# Patient Record
Sex: FEMALE | Race: WHITE | Employment: FULL TIME | URBAN - METROPOLITAN AREA
[De-identification: names, ages, dates, MRNs, and addresses within clinical notes are randomized per-mention and may not be internally consistent; named-entity substitution may affect disease eponyms.]

---

## 2021-11-21 ENCOUNTER — HOSPITAL ENCOUNTER (INPATIENT)
Age: 26
LOS: 1 days | Discharge: HOME | DRG: 776 | End: 2021-11-23
Attending: STUDENT IN AN ORGANIZED HEALTH CARE EDUCATION/TRAINING PROGRAM | Admitting: HOSPITALIST
Payer: COMMERCIAL

## 2021-11-21 DIAGNOSIS — E87.6 HYPOKALEMIA: ICD-10-CM

## 2021-11-21 DIAGNOSIS — Z98.891 H/O CESAREAN SECTION: ICD-10-CM

## 2021-11-21 DIAGNOSIS — D64.9 ANEMIA, UNSPECIFIED TYPE: ICD-10-CM

## 2021-11-21 DIAGNOSIS — N30.00 ACUTE CYSTITIS WITHOUT HEMATURIA: Primary | ICD-10-CM

## 2021-11-21 PROCEDURE — 99285 EMERGENCY DEPT VISIT HI MDM: CPT

## 2021-11-21 PROCEDURE — 81001 URINALYSIS AUTO W/SCOPE: CPT

## 2021-11-21 PROCEDURE — 85025 COMPLETE CBC W/AUTO DIFF WBC: CPT

## 2021-11-21 PROCEDURE — 96374 THER/PROPH/DIAG INJ IV PUSH: CPT

## 2021-11-21 PROCEDURE — 80048 BASIC METABOLIC PNL TOTAL CA: CPT

## 2021-11-21 PROCEDURE — 82550 ASSAY OF CK (CPK): CPT

## 2021-11-21 PROCEDURE — 96375 TX/PRO/DX INJ NEW DRUG ADDON: CPT

## 2021-11-22 ENCOUNTER — APPOINTMENT (OUTPATIENT)
Dept: CT IMAGING | Age: 26
DRG: 776 | End: 2021-11-22
Attending: INTERNAL MEDICINE
Payer: COMMERCIAL

## 2021-11-22 ENCOUNTER — APPOINTMENT (OUTPATIENT)
Dept: VASCULAR SURGERY | Age: 26
DRG: 776 | End: 2021-11-22
Attending: INTERNAL MEDICINE
Payer: COMMERCIAL

## 2021-11-22 ENCOUNTER — APPOINTMENT (OUTPATIENT)
Dept: NON INVASIVE DIAGNOSTICS | Age: 26
DRG: 776 | End: 2021-11-22
Attending: HOSPITALIST
Payer: COMMERCIAL

## 2021-11-22 PROBLEM — E87.6 HYPOKALEMIA: Status: ACTIVE | Noted: 2021-11-22

## 2021-11-22 PROBLEM — Z91.89 AT RISK FOR BREASTFEEDING DIFFICULTY: Status: ACTIVE | Noted: 2021-11-22

## 2021-11-22 PROBLEM — N39.0 UTI (URINARY TRACT INFECTION): Status: ACTIVE | Noted: 2021-11-22

## 2021-11-22 PROBLEM — D72.829 LEUKOCYTOSIS: Status: ACTIVE | Noted: 2021-11-22

## 2021-11-22 PROBLEM — D64.9 ANEMIA: Status: ACTIVE | Noted: 2021-11-22

## 2021-11-22 LAB
ALBUMIN SERPL-MCNC: 1.9 G/DL (ref 3.4–5)
ALBUMIN/GLOB SERPL: 0.6 {RATIO} (ref 0.8–1.7)
ALP SERPL-CCNC: 131 U/L (ref 45–117)
ALT SERPL-CCNC: 16 U/L (ref 13–56)
ANION GAP SERPL CALC-SCNC: 10 MMOL/L (ref 3–18)
ANION GAP SERPL CALC-SCNC: 8 MMOL/L (ref 3–18)
APPEARANCE UR: ABNORMAL
AST SERPL-CCNC: 12 U/L (ref 10–38)
BACTERIA URNS QL MICRO: ABNORMAL /HPF
BASOPHILS # BLD: 0 K/UL (ref 0–0.1)
BASOPHILS # BLD: 0.2 K/UL (ref 0–0.1)
BASOPHILS NFR BLD: 0 % (ref 0–2)
BASOPHILS NFR BLD: 1 % (ref 0–2)
BILIRUB SERPL-MCNC: 0.5 MG/DL (ref 0.2–1)
BILIRUB UR QL: NEGATIVE
BUN SERPL-MCNC: 10 MG/DL (ref 7–18)
BUN SERPL-MCNC: 14 MG/DL (ref 7–18)
BUN/CREAT SERPL: 18 (ref 12–20)
BUN/CREAT SERPL: 25 (ref 12–20)
CALCIUM SERPL-MCNC: 7.9 MG/DL (ref 8.5–10.1)
CALCIUM SERPL-MCNC: 8 MG/DL (ref 8.5–10.1)
CHLORIDE SERPL-SCNC: 110 MMOL/L (ref 100–111)
CHLORIDE SERPL-SCNC: 111 MMOL/L (ref 100–111)
CK SERPL-CCNC: 38 U/L (ref 26–192)
CO2 SERPL-SCNC: 21 MMOL/L (ref 21–32)
CO2 SERPL-SCNC: 23 MMOL/L (ref 21–32)
COLOR UR: 0
CREAT SERPL-MCNC: 0.55 MG/DL (ref 0.6–1.3)
CREAT SERPL-MCNC: 0.56 MG/DL (ref 0.6–1.3)
DIFFERENTIAL METHOD BLD: ABNORMAL
DIFFERENTIAL METHOD BLD: ABNORMAL
ECHO AO ROOT DIAM: 2.48 CM
ECHO AV AREA PEAK VELOCITY: 2.79 CM2
ECHO AV AREA VTI: 2.53 CM2
ECHO AV AREA/BSA PEAK VELOCITY: 1.5 CM2/M2
ECHO AV AREA/BSA VTI: 1.4 CM2/M2
ECHO AV MEAN GRADIENT: 5.7 MMHG
ECHO AV PEAK GRADIENT: 9.87 MMHG
ECHO AV PEAK VELOCITY: 156.96 CM/S
ECHO AV VTI: 33.94 CM
ECHO EST RA PRESSURE: 3 MMHG
ECHO IVC PROX: 1.75 CM
ECHO LA AREA 4C: 25.17 CM2
ECHO LA MAJOR AXIS: 3.22 CM
ECHO LA MINOR AXIS: 1.77 CM
ECHO LA VOL 2C: 46.83 ML (ref 22–52)
ECHO LA VOL 4C: 77.43 ML (ref 22–52)
ECHO LA VOL BP: 67.9 ML (ref 22–52)
ECHO LA VOL/BSA BIPLANE: 37.31 ML/M2 (ref 16–28)
ECHO LA VOLUME INDEX A2C: 25.73 ML/M2 (ref 16–28)
ECHO LA VOLUME INDEX A4C: 42.54 ML/M2 (ref 16–28)
ECHO LV E' LATERAL VELOCITY: 13.99 CM/S
ECHO LV E' SEPTAL VELOCITY: 10.8 CM/S
ECHO LV EDV A2C: 169.32 ML
ECHO LV EDV A4C: 139.63 ML
ECHO LV EDV BP: 165.68 ML (ref 56–104)
ECHO LV EDV INDEX A4C: 76.7 ML/M2
ECHO LV EDV INDEX BP: 91 ML/M2
ECHO LV EDV NDEX A2C: 93 ML/M2
ECHO LV EJECTION FRACTION A2C: 50 PERCENT
ECHO LV EJECTION FRACTION A4C: 59 PERCENT
ECHO LV EJECTION FRACTION BIPLANE: 55.1 PERCENT (ref 55–100)
ECHO LV ESV A2C: 84.25 ML
ECHO LV ESV A4C: 56.7 ML
ECHO LV ESV BP: 74.38 ML (ref 19–49)
ECHO LV ESV INDEX A2C: 46.3 ML/M2
ECHO LV ESV INDEX A4C: 31.2 ML/M2
ECHO LV ESV INDEX BP: 40.9 ML/M2
ECHO LV GLOBAL LONGITUDINAL STRAIN (GLS): -17.4 PERCENT
ECHO LV INTERNAL DIMENSION DIASTOLIC: 4.99 CM (ref 3.9–5.3)
ECHO LV INTERNAL DIMENSION SYSTOLIC: 3.03 CM
ECHO LV IVSD: 0.86 CM (ref 0.6–0.9)
ECHO LV MASS 2D: 163.5 G (ref 67–162)
ECHO LV MASS INDEX 2D: 89.8 G/M2 (ref 43–95)
ECHO LV POSTERIOR WALL DIASTOLIC: 0.99 CM (ref 0.6–0.9)
ECHO LVOT CARDIAC OUTPUT: 7.08 LITER/MINUTE
ECHO LVOT DIAM: 2.24 CM
ECHO LVOT PEAK GRADIENT: 4.93 MMHG
ECHO LVOT PEAK VELOCITY: 111.03 CM/S
ECHO LVOT SV: 86 ML
ECHO LVOT VTI: 21.78 CM
ECHO MV A VELOCITY: 61.75 CM/S
ECHO MV E DECELERATION TIME (DT): 158.17 MS
ECHO MV E VELOCITY: 117.55 CM/S
ECHO MV E/A RATIO: 1.9
ECHO MV E/E' LATERAL: 8.4
ECHO MV E/E' RATIO (AVERAGED): 9.64
ECHO MV E/E' SEPTAL: 10.88
ECHO MV REGURGITANT VTIA: 192.88 CM
ECHO RA AREA 4C: 22.61 CM2
ECHO RV INTERNAL DIMENSION: 3.6 CM
ECHO RV TAPSE: 0.43 CM (ref 1.5–2)
ECHO TV REGURGITANT MAX VELOCITY: 279.23 CM/S
ECHO TV REGURGITANT PEAK GRADIENT: 31.19 MMHG
EOSINOPHIL # BLD: 0 K/UL (ref 0–0.4)
EOSINOPHIL # BLD: 0 K/UL (ref 0–0.4)
EOSINOPHIL NFR BLD: 0 % (ref 0–5)
EOSINOPHIL NFR BLD: 0 % (ref 0–5)
EPITH CASTS URNS QL MICRO: ABNORMAL /LPF (ref 0–5)
ERYTHROCYTE [DISTWIDTH] IN BLOOD BY AUTOMATED COUNT: 14.3 % (ref 11.6–14.5)
ERYTHROCYTE [DISTWIDTH] IN BLOOD BY AUTOMATED COUNT: 14.7 % (ref 11.6–14.5)
GLOBAL LONGITUDINAL STRAIN 2 CHAMBER: -19.1 PERCENT
GLOBAL LONGITUDINAL STRAIN 4 CHAMBER: -15.9 PERCENT
GLOBAL LONGITUDINAL STRAIN LONG AXIS: -17.3 PERCENT
GLOBULIN SER CALC-MCNC: 3.1 G/DL (ref 2–4)
GLUCOSE SERPL-MCNC: 80 MG/DL (ref 74–99)
GLUCOSE SERPL-MCNC: 89 MG/DL (ref 74–99)
GLUCOSE UR STRIP.AUTO-MCNC: NEGATIVE MG/DL
HCT VFR BLD AUTO: 21.4 % (ref 35–45)
HCT VFR BLD AUTO: 24.8 % (ref 35–45)
HGB BLD-MCNC: 7 G/DL (ref 12–16)
HGB BLD-MCNC: 8.2 G/DL (ref 12–16)
HGB UR QL STRIP: ABNORMAL
HISTORY CHECKED?,CKHIST: NORMAL
IMM GRANULOCYTES # BLD AUTO: 0 K/UL
IMM GRANULOCYTES # BLD AUTO: 0 K/UL
IMM GRANULOCYTES NFR BLD AUTO: 0 %
IMM GRANULOCYTES NFR BLD AUTO: 0 %
KETONES UR QL STRIP.AUTO: ABNORMAL MG/DL
LA VOL DISK BP: 64.34 ML (ref 22–52)
LEUKOCYTE ESTERASE UR QL STRIP.AUTO: ABNORMAL
LVOT MG: 2.71 MMHG
LYMPHOCYTES # BLD: 1.5 K/UL (ref 0.9–3.6)
LYMPHOCYTES # BLD: 2.2 K/UL (ref 0.9–3.6)
LYMPHOCYTES NFR BLD: 13 % (ref 21–52)
LYMPHOCYTES NFR BLD: 9 % (ref 21–52)
MAGNESIUM SERPL-MCNC: 1.9 MG/DL (ref 1.6–2.6)
MCH RBC QN AUTO: 30.6 PG (ref 24–34)
MCH RBC QN AUTO: 30.8 PG (ref 24–34)
MCHC RBC AUTO-ENTMCNC: 32.7 G/DL (ref 31–37)
MCHC RBC AUTO-ENTMCNC: 33.1 G/DL (ref 31–37)
MCV RBC AUTO: 93.2 FL (ref 78–100)
MCV RBC AUTO: 93.4 FL (ref 78–100)
MONOCYTES # BLD: 0.3 K/UL (ref 0.05–1.2)
MONOCYTES # BLD: 0.7 K/UL (ref 0.05–1.2)
MONOCYTES NFR BLD: 2 % (ref 3–10)
MONOCYTES NFR BLD: 4 % (ref 3–10)
MR MG: 106.42 MMHG
MV DEC SLOPE: 7.46 METER/SECOND2
NEUTS BAND NFR BLD MANUAL: 2 % (ref 0–5)
NEUTS BAND NFR BLD MANUAL: 4 % (ref 0–5)
NEUTS SEG # BLD: 14.1 K/UL (ref 1.8–8)
NEUTS SEG # BLD: 14.8 K/UL (ref 1.8–8)
NEUTS SEG NFR BLD: 81 % (ref 40–73)
NEUTS SEG NFR BLD: 84 % (ref 40–73)
NITRITE UR QL STRIP.AUTO: NEGATIVE
NRBC # BLD: 0.05 K/UL (ref 0–0.01)
NRBC # BLD: 0.06 K/UL (ref 0–0.01)
NRBC BLD-RTO: 0.3 PER 100 WBC
NRBC BLD-RTO: 0.4 PER 100 WBC
PH UR STRIP: 6.5 [PH] (ref 5–8)
PLATELET # BLD AUTO: 369 K/UL (ref 135–420)
PLATELET # BLD AUTO: 486 K/UL (ref 135–420)
PLATELET COMMENTS,PCOM: ABNORMAL
PLATELET COMMENTS,PCOM: ABNORMAL
PMV BLD AUTO: 8.5 FL (ref 9.2–11.8)
PMV BLD AUTO: 8.6 FL (ref 9.2–11.8)
POTASSIUM SERPL-SCNC: 3 MMOL/L (ref 3.5–5.5)
POTASSIUM SERPL-SCNC: 3.7 MMOL/L (ref 3.5–5.5)
PROT SERPL-MCNC: 5 G/DL (ref 6.4–8.2)
PROT UR STRIP-MCNC: 100 MG/DL
RBC # BLD AUTO: 2.29 M/UL (ref 4.2–5.3)
RBC # BLD AUTO: 2.66 M/UL (ref 4.2–5.3)
RBC #/AREA URNS HPF: ABNORMAL /HPF (ref 0–5)
RBC MORPH BLD: ABNORMAL
RBC MORPH BLD: ABNORMAL
SODIUM SERPL-SCNC: 141 MMOL/L (ref 136–145)
SODIUM SERPL-SCNC: 142 MMOL/L (ref 136–145)
SP GR UR REFRACTOMETRY: 1.02 (ref 1–1.03)
UROBILINOGEN UR QL STRIP.AUTO: 1 EU/DL (ref 0.2–1)
WBC # BLD AUTO: 16.8 K/UL (ref 4.6–13.2)
WBC # BLD AUTO: 17 K/UL (ref 4.6–13.2)
WBC URNS QL MICRO: ABNORMAL /HPF (ref 0–5)

## 2021-11-22 PROCEDURE — 74011250637 HC RX REV CODE- 250/637: Performed by: STUDENT IN AN ORGANIZED HEALTH CARE EDUCATION/TRAINING PROGRAM

## 2021-11-22 PROCEDURE — 93306 TTE W/DOPPLER COMPLETE: CPT

## 2021-11-22 PROCEDURE — P9016 RBC LEUKOCYTES REDUCED: HCPCS

## 2021-11-22 PROCEDURE — 74011250636 HC RX REV CODE- 250/636: Performed by: STUDENT IN AN ORGANIZED HEALTH CARE EDUCATION/TRAINING PROGRAM

## 2021-11-22 PROCEDURE — 30233N1 TRANSFUSION OF NONAUTOLOGOUS RED BLOOD CELLS INTO PERIPHERAL VEIN, PERCUTANEOUS APPROACH: ICD-10-PCS | Performed by: HOSPITALIST

## 2021-11-22 PROCEDURE — 36415 COLL VENOUS BLD VENIPUNCTURE: CPT

## 2021-11-22 PROCEDURE — 65270000029 HC RM PRIVATE

## 2021-11-22 PROCEDURE — 74011000250 HC RX REV CODE- 250: Performed by: STUDENT IN AN ORGANIZED HEALTH CARE EDUCATION/TRAINING PROGRAM

## 2021-11-22 PROCEDURE — 93970 EXTREMITY STUDY: CPT

## 2021-11-22 PROCEDURE — 74011250636 HC RX REV CODE- 250/636: Performed by: HOSPITALIST

## 2021-11-22 PROCEDURE — 86901 BLOOD TYPING SEROLOGIC RH(D): CPT

## 2021-11-22 PROCEDURE — 83735 ASSAY OF MAGNESIUM: CPT

## 2021-11-22 PROCEDURE — G0378 HOSPITAL OBSERVATION PER HR: HCPCS

## 2021-11-22 PROCEDURE — 74011250637 HC RX REV CODE- 250/637: Performed by: INTERNAL MEDICINE

## 2021-11-22 PROCEDURE — 86923 COMPATIBILITY TEST ELECTRIC: CPT

## 2021-11-22 PROCEDURE — 80053 COMPREHEN METABOLIC PANEL: CPT

## 2021-11-22 PROCEDURE — 85025 COMPLETE CBC W/AUTO DIFF WBC: CPT

## 2021-11-22 PROCEDURE — 74011250637 HC RX REV CODE- 250/637: Performed by: ADVANCED PRACTICE MIDWIFE

## 2021-11-22 PROCEDURE — 74174 CTA ABD&PLVS W/CONTRAST: CPT

## 2021-11-22 PROCEDURE — 74011000636 HC RX REV CODE- 636: Performed by: INTERNAL MEDICINE

## 2021-11-22 PROCEDURE — 36430 TRANSFUSION BLD/BLD COMPNT: CPT

## 2021-11-22 RX ORDER — ONDANSETRON 4 MG/1
4 TABLET, ORALLY DISINTEGRATING ORAL
Status: DISCONTINUED | OUTPATIENT
Start: 2021-11-22 | End: 2021-11-23 | Stop reason: HOSPADM

## 2021-11-22 RX ORDER — FUROSEMIDE 10 MG/ML
40 INJECTION INTRAMUSCULAR; INTRAVENOUS DAILY
Status: DISCONTINUED | OUTPATIENT
Start: 2021-11-23 | End: 2021-11-22

## 2021-11-22 RX ORDER — SODIUM CHLORIDE 0.9 % (FLUSH) 0.9 %
5-40 SYRINGE (ML) INJECTION EVERY 8 HOURS
Status: DISCONTINUED | OUTPATIENT
Start: 2021-11-22 | End: 2021-11-23 | Stop reason: HOSPADM

## 2021-11-22 RX ORDER — ONDANSETRON 2 MG/ML
4 INJECTION INTRAMUSCULAR; INTRAVENOUS
Status: COMPLETED | OUTPATIENT
Start: 2021-11-22 | End: 2021-11-22

## 2021-11-22 RX ORDER — ACETAMINOPHEN 500 MG
1000 TABLET ORAL
Status: COMPLETED | OUTPATIENT
Start: 2021-11-22 | End: 2021-11-22

## 2021-11-22 RX ORDER — SODIUM CHLORIDE 0.9 % (FLUSH) 0.9 %
5-40 SYRINGE (ML) INJECTION AS NEEDED
Status: DISCONTINUED | OUTPATIENT
Start: 2021-11-22 | End: 2021-11-23 | Stop reason: HOSPADM

## 2021-11-22 RX ORDER — LANOLIN ALCOHOL/MO/W.PET/CERES
1 CREAM (GRAM) TOPICAL 2 TIMES DAILY WITH MEALS
Status: DISCONTINUED | OUTPATIENT
Start: 2021-11-22 | End: 2021-11-23 | Stop reason: HOSPADM

## 2021-11-22 RX ORDER — POLYETHYLENE GLYCOL 3350 17 G/17G
17 POWDER, FOR SOLUTION ORAL DAILY PRN
Status: DISCONTINUED | OUTPATIENT
Start: 2021-11-22 | End: 2021-11-23 | Stop reason: HOSPADM

## 2021-11-22 RX ORDER — ONDANSETRON 2 MG/ML
4 INJECTION INTRAMUSCULAR; INTRAVENOUS
Status: DISCONTINUED | OUTPATIENT
Start: 2021-11-22 | End: 2021-11-23 | Stop reason: HOSPADM

## 2021-11-22 RX ORDER — ACETAMINOPHEN 650 MG/1
650 SUPPOSITORY RECTAL
Status: DISCONTINUED | OUTPATIENT
Start: 2021-11-22 | End: 2021-11-23 | Stop reason: HOSPADM

## 2021-11-22 RX ORDER — CEPHALEXIN 500 MG/1
500 CAPSULE ORAL 4 TIMES DAILY
Qty: 40 CAPSULE | Refills: 0 | Status: SHIPPED | OUTPATIENT
Start: 2021-11-22 | End: 2021-11-29

## 2021-11-22 RX ORDER — SODIUM CHLORIDE 9 MG/ML
250 INJECTION, SOLUTION INTRAVENOUS AS NEEDED
Status: DISCONTINUED | OUTPATIENT
Start: 2021-11-22 | End: 2021-11-23 | Stop reason: HOSPADM

## 2021-11-22 RX ORDER — FUROSEMIDE 10 MG/ML
20 INJECTION INTRAMUSCULAR; INTRAVENOUS 2 TIMES DAILY
Status: DISCONTINUED | OUTPATIENT
Start: 2021-11-22 | End: 2021-11-23 | Stop reason: HOSPADM

## 2021-11-22 RX ORDER — IBUPROFEN 400 MG/1
800 TABLET ORAL 3 TIMES DAILY
Status: DISCONTINUED | OUTPATIENT
Start: 2021-11-22 | End: 2021-11-23 | Stop reason: HOSPADM

## 2021-11-22 RX ORDER — ACETAMINOPHEN 325 MG/1
650 TABLET ORAL
Status: DISCONTINUED | OUTPATIENT
Start: 2021-11-22 | End: 2021-11-23 | Stop reason: HOSPADM

## 2021-11-22 RX ORDER — POTASSIUM CHLORIDE 20 MEQ/1
20 TABLET, EXTENDED RELEASE ORAL 3 TIMES DAILY
Qty: 9 TABLET | Refills: 0 | Status: SHIPPED | OUTPATIENT
Start: 2021-11-22 | End: 2021-11-29

## 2021-11-22 RX ORDER — POTASSIUM CHLORIDE 20 MEQ/1
40 TABLET, EXTENDED RELEASE ORAL
Status: COMPLETED | OUTPATIENT
Start: 2021-11-22 | End: 2021-11-22

## 2021-11-22 RX ADMIN — Medication 10 ML: at 14:00

## 2021-11-22 RX ADMIN — IBUPROFEN 800 MG: 400 TABLET, FILM COATED ORAL at 17:27

## 2021-11-22 RX ADMIN — FUROSEMIDE 20 MG: 10 INJECTION, SOLUTION INTRAMUSCULAR; INTRAVENOUS at 14:39

## 2021-11-22 RX ADMIN — POTASSIUM CHLORIDE 40 MEQ: 20 TABLET, EXTENDED RELEASE ORAL at 00:36

## 2021-11-22 RX ADMIN — POLYETHYLENE GLYCOL 3350 17 G: 17 POWDER, FOR SOLUTION ORAL at 17:33

## 2021-11-22 RX ADMIN — FERROUS SULFATE TAB 325 MG (65 MG ELEMENTAL FE) 325 MG: 325 (65 FE) TAB at 17:27

## 2021-11-22 RX ADMIN — ONDANSETRON 4 MG: 2 INJECTION INTRAMUSCULAR; INTRAVENOUS at 00:32

## 2021-11-22 RX ADMIN — FUROSEMIDE 20 MG: 10 INJECTION, SOLUTION INTRAMUSCULAR; INTRAVENOUS at 22:46

## 2021-11-22 RX ADMIN — CEFTRIAXONE 1 G: 1 INJECTION, POWDER, FOR SOLUTION INTRAMUSCULAR; INTRAVENOUS at 00:37

## 2021-11-22 RX ADMIN — Medication 10 ML: at 22:46

## 2021-11-22 RX ADMIN — FERROUS SULFATE TAB 325 MG (65 MG ELEMENTAL FE) 325 MG: 325 (65 FE) TAB at 10:36

## 2021-11-22 RX ADMIN — SODIUM CHLORIDE, POTASSIUM CHLORIDE, SODIUM LACTATE AND CALCIUM CHLORIDE 1000 ML: 600; 310; 30; 20 INJECTION, SOLUTION INTRAVENOUS at 00:32

## 2021-11-22 RX ADMIN — IBUPROFEN 800 MG: 400 TABLET, FILM COATED ORAL at 10:36

## 2021-11-22 RX ADMIN — ACETAMINOPHEN 1000 MG: 500 TABLET ORAL at 00:33

## 2021-11-22 RX ADMIN — IBUPROFEN 800 MG: 400 TABLET, FILM COATED ORAL at 22:46

## 2021-11-22 RX ADMIN — ACETAMINOPHEN 650 MG: 325 TABLET ORAL at 14:39

## 2021-11-22 RX ADMIN — IOPAMIDOL 100 ML: 755 INJECTION, SOLUTION INTRAVENOUS at 06:49

## 2021-11-22 NOTE — PROGRESS NOTES
Ct   Prominent enhancing uterine and parametrial vessels, with gas in the enlarged  uterus. This is nonspecific and the postpartum/post  state, and can be  normal. CT cannot exclude endometritis if clinically suspected. Contains gas in  the anterior abdominal wall superficial to the musculature, additionally  nonspecific and can be postoperative related to phlegmon/infection. No  circumscribed, drainable collections are seen.     2. Bilateral pleural effusions and septal interstitial thickening, which can be  seen in setting of fluid overload/pulmonary edema. Enlarged IVC additionally  which can be seen in the setting of overhydration. Bibasilar dependent  atelectasis or consolidation.     3. Intraperitoneal free fluid, and fluid about the distended gallbladder,  without calcified gallstones. This is nonspecific and may be related to recent  postoperative state If clinical findings suggest cholecystitis, then dedicated  gallbladder ultrasound could best further assess.     4. Mild bladder wall thickening, which can be seen in the setting of  underdistention or superimposed infection/cystitis, best correlated with  clinical and laboratory/urinalysis findings. No dvt     Echo , lasix, abdomen ultrasound   OB saw pt per pt report   Continue iv abx   Mother was at the bedside. Talked with Trent Kumar . Not thinking endometritis.  Not recommend chemical dvt prophy at this point-recommend scd

## 2021-11-22 NOTE — DISCHARGE INSTRUCTIONS
Please return to the ER with any new or worsening symptoms or any other concerns. Please return to the Emergency Department if you develop a fever, chills, cannot eat or drink due to nausea or vomiting, or if any of your symptoms worsen. Please follow up with PCP and Ob Gynecology    Also, It is extremely important that you follow-up with a primary care physician and if you do not have one currently use the contact information provided to obtain an appointment. If none was provided please call the number on the back of your insurance card to locate a Primary care doctor. Many offices have \"cancellation lists\" that you can ask to be placed on; should a patient with an earlier appointment cancel you will be notified to take their place. Please return to the Emergency Room immediately if your symptoms worsen. Please carefully read all discharge instructions    [email or web address]    What are GoodRx coupons? GoodRx coupons will help you pay less than the cash price for your prescription. [place] [place] free to use and are accepted at virtually every [place] pharmacy. Your pharmacist will know how to enter the codes on the coupon to pull up the lowest discount available.

## 2021-11-22 NOTE — ED NOTES
Attempt to call report to [facility]. Was told that the charge RN would call me back.

## 2021-11-22 NOTE — PROGRESS NOTES
Reason for Admission:  Post Op complication                     RUR Score:   N/A                  Plan for utilizing home health:   TBD       PCP: First and Last name:  Montrell Dougherty MD     Name of Practice:    Are you a current patient: Yes/No:    Approximate date of last visit:    Can you participate in a virtual visit with your PCP:                     Current Advanced Directive/Advance Care Plan: Full Code      Healthcare Decision Maker:   Click here to complete [address] including selection of the Healthcare Decision Maker Relationship (ie \"Primary\")                             Transition of Care Plan:       Home with physician follow up                Chart reviewed. Per H&P Lyndsey Richie German is a 27 y.o.  female who G1, P1 postpartum 1 week 9 pound baby boy post  transabdominal presents to the emergency room with increased dyspnea on exertion fevers chills and back pain. Patient was discharged with a hemoglobin of 9 and her hemoglobin in the emergency room was found to be 7 she has been passing large clots of blood she called OB who gave her reassurance. Despite that patient has had increasing fatigue chills and general malaise and came to be evaluated. Her son is now having diarrhea but otherwise she feels well she is pumping and breast-feeding. She had her Pfizer vaccine in  denies any Covid exposures  In the emergency room she has received 1 unit of blood but is still feeling weak with general malaise and dyspnea also complained of lower extremity edema did not have any preeclampsia postpartum hypertension. In the emergency room she had a brief episode of hypoxemia during blood transfusion that required oxygen she is now better. \"    Please encourage ambulation as appropriate to assist with identifying a transition of care. CM to continue to follow and assist.      1620:  CM spoke with pt to discuss transition of care. Pt is  and independent.   Pt indicated she sees Dr. Elisabeth Perales in the community. Anticipate pt will transition home with physician follow up when medically stable. CM to continue to follow and assist.    Care Management Interventions  Mode of Transport at Discharge:  Other (see comment) (Family)  Transition of Care Consult (CM Consult): Discharge Planning  Health Maintenance Reviewed: Yes  Support Systems: Spouse/Significant Other  The Plan for Transition of Care is Related to the Following Treatment Goals : Home with phyisicn follow up  Discharge Location  Discharge Placement: Home with family assistance

## 2021-11-22 NOTE — ED NOTES
TRANSFER - OUT REPORT:    Verbal report given to [facility] on Higinio Alvarez  being transferred to Medical (unit) for routine progression of care       Report consisted of patients Situation, Background, Assessment and   Recommendations(SBAR). Information from the following report(s) SBAR, ED Summary, [facility] and Recent Results was reviewed with the receiving nurse. Lines:   Peripheral IV 11/21/21 Right Antecubital (Active)   Site Assessment Clean, dry, & intact 11/21/21 2339   Phlebitis Assessment 0 11/21/21 2339   Infiltration Assessment 0 11/21/21 2339   Dressing Status Clean, dry, & intact; New 11/21/21 2339        Opportunity for questions and clarification was provided.       Patient transported with:   LastRoom

## 2021-11-22 NOTE — ED NOTES
Arrives one week post op c section with c/o chills without known fever and passing a blood clot vaginally this evening.  Pt is in NAD

## 2021-11-22 NOTE — PROGRESS NOTES
Gynecology Progress Note    Karina Hsieh    Assesment:   Patient Active Problem List   Diagnosis Code    UTI (urinary tract infection) N39.0    Anemia D64.9    Leukocytosis D72.829    Hypokalemia E87.6       Plan:   Breast pump with lactation consult   Motrin 800mg PO TID   Ferrous sulfate 325mg PO BID     She is c/o of pain. She is S/P a  8 days ago at [facility]. She is a patient of [address] [place] [address] Road. She is breastfeeding and leaking. Reports pumping last at 1800 yesterday. Voiding without difficulty. Patient is passing flatus. She is is tolerating her diet.     Orders/Charges: Low    Current Facility-Administered Medications   Medication Dose Route Frequency    sodium chloride (NS) flush 5-40 mL  5-40 mL IntraVENous Q8H    cefTRIAXone (ROCEPHIN) 1 g in sterile water (preservative free) 10 mL IV syringe  1 g IntraVENous Q24H    ibuprofen (MOTRIN) tablet 800 mg  800 mg Oral TID    ferrous sulfate tablet 325 mg  1 Tablet Oral BID WITH MEALS     Vitals:  Visit Vitals  /76   Pulse 84   Temp 98 °F (36.7 °C)   Resp 17   Ht 5' 4\" (1.626 m)   Wt 76.4 kg (168 lb 6.4 oz)   SpO2 93%   BMI 28.91 kg/m²     Temp (24hrs), Av.3 °F (36.8 °C), Min:98 °F (36.7 °C), Max:98.6 °F (37 °C)      Last 24hr Input/Output:    Intake/Output Summary (Last 24 hours) at 2021 3196  Last data filed at 2021 0235  Gross per 24 hour   Intake 1000 ml   Output    Net 1000 ml          Exam:   General: alert, fatigued, cooperative, mild distress, appears stated age     Lung: clear to auscultation bilaterally     Heart: regular rate and rhythm, S1, S2 normal, no murmur, click, rub or gallop     Abdomen: abdomen is soft without significant tenderness, masses, organomegaly or guarding;     incision clean, dry, and intact     Extremities: normal post op swelling      Labs:   Lab Results   Component Value Date/Time    WBC 16.8 (H) 2021 11:40 PM    HGB 7.0 (L) 2021 11:40 PM    HCT 21.4 (L) 2021 11:40 PM    PLATELET 105 (H) 47/48/6179 11:40 PM       Recent Results (from the past 24 hour(s))   CBC WITH AUTOMATED DIFF    Collection Time: 11/21/21 11:40 PM   Result Value Ref Range    WBC 16.8 (H) 4.6 - 13.2 K/uL    RBC 2.29 (L) 4.20 - 5.30 M/uL    HGB 7.0 (L) 12.0 - 16.0 g/dL    HCT 21.4 (L) 35.0 - 45.0 %    MCV 93.4 78.0 - 100.0 FL    MCH 30.6 24.0 - 34.0 PG    MCHC 32.7 31.0 - 37.0 g/dL    RDW 14.3 11.6 - 14.5 %    PLATELET 873 (H) 031 - 420 K/uL    MPV 8.5 (L) 9.2 - 11.8 FL    NRBC 0.3 (H) 0  WBC    ABSOLUTE NRBC 0.05 (H) 0.00 - 0.01 K/uL    NEUTROPHILS 84 (H) 40 - 73 %    BAND NEUTROPHILS 4 0 - 5 %    LYMPHOCYTES 9 (L) 21 - 52 %    MONOCYTES 2 (L) 3 - 10 %    EOSINOPHILS 0 0 - 5 %    BASOPHILS 1 0 - 2 %    IMMATURE GRANULOCYTES 0 %    ABS. NEUTROPHILS 14.8 (H) 1.8 - 8.0 K/UL    ABS. LYMPHOCYTES 1.5 0.9 - 3.6 K/UL    ABS. MONOCYTES 0.3 0.05 - 1.2 K/UL    ABS. EOSINOPHILS 0.0 0.0 - 0.4 K/UL    ABS. BASOPHILS 0.2 (H) 0.0 - 0.1 K/UL    ABS. IMM.  GRANS. 0.0 K/UL    DF MANUAL      PLATELET COMMENTS Increased Platelets      RBC COMMENTS HYPOCHROMIA  SLIGHT       METABOLIC PANEL, BASIC    Collection Time: 11/21/21 11:40 PM   Result Value Ref Range    Sodium 141 136 - 145 mmol/L    Potassium 3.0 (L) 3.5 - 5.5 mmol/L    Chloride 110 100 - 111 mmol/L    CO2 23 21 - 32 mmol/L    Anion gap 8 3.0 - 18 mmol/L    Glucose 89 74 - 99 mg/dL    BUN 14 7.0 - 18 MG/DL    Creatinine 0.55 (L) 0.6 - 1.3 MG/DL    BUN/Creatinine ratio 25 (H) 12 - 20      GFR est AA >60 >60 ml/min/1.73m2    GFR est non-AA >60 >60 ml/min/1.73m2    Calcium 8.0 (L) 8.5 - 10.1 MG/DL   URINALYSIS W/ RFLX MICROSCOPIC    Collection Time: 11/21/21 11:40 PM   Result Value Ref Range    Color 0      Appearance TURBID      Specific gravity 1.025 1.005 - 1.030      pH (UA) 6.5 5.0 - 8.0      Protein 100 (A) NEG mg/dL    Glucose Negative NEG mg/dL    Ketone TRACE (A) NEG mg/dL    Bilirubin Negative NEG      Blood LARGE (A) NEG      Urobilinogen 1.0 0.2 - 1.0 EU/dL    Nitrites Negative NEG      Leukocyte Esterase LARGE (A) NEG     CK    Collection Time: 11/21/21 11:40 PM   Result Value Ref Range    CK 38 26 - 192 U/L   URINE MICROSCOPIC ONLY    Collection Time: 11/21/21 11:40 PM   Result Value Ref Range    WBC TOO NUMEROUS TO COUNT 0 - 5 /hpf    RBC 4 to 10 0 - 5 /hpf    Epithelial cells FEW 0 - 5 /lpf    Bacteria 2+ (A) NEG /hpf   RBC, ALLOCATE    Collection Time: 11/22/21  1:30 AM   Result Value Ref Range    HISTORY CHECKED?  Historical check performed    TYPE & SCREEN    Collection Time: 11/22/21  1:36 AM   Result Value Ref Range    Crossmatch Expiration 11/25/2021,2359     ABO/Rh(D) O POSITIVE     Antibody screen NEG     Unit number [id number]     Blood component type [facility]     Unit division 00     Status of unit ISSUED     Crossmatch result Compatible      Diana Yen CNM

## 2021-11-22 NOTE — ED PROVIDER NOTES
EMERGENCY DEPARTMENT HISTORY AND PHYSICAL EXAM      Date: 2021  Patient Name: Nicole Boudreaux    History of Presenting Illness     Chief Complaint   Patient presents with    Post OP Complication       History Provided By: Patient    HPI:  Nicole Boudreaux is a 27 y.o. female with history of , 7 days ago who complains of fever, subjective, chills, malaise, dyspnea on exertion. She denies any vaginal discharge other than lochia. No foul-smelling discharge. She denies any sick contacts. She does have pain around her site, seems appropriate. The patient denies any aggravating or alleviating factors - and has not taken any medications in an attempt to alleviate her symptoms. PMH, PSH, family history, social history, allergies reviewed with the patient with significant items noted above. PCP: Grisel Moss MD    Current Facility-Administered Medications   Medication Dose Route Frequency Provider Last Rate Last Admin    0.9% sodium chloride infusion 250 mL  250 mL IntraVENous PRN Juan Manuel Howard MD         Current Outpatient Medications   Medication Sig Dispense Refill    cephALEXin (Keflex) 500 mg capsule Take 1 Capsule by mouth four (4) times daily for 10 days. 40 Capsule 0    potassium chloride (K-DUR, KLOR-CON M20) 20 mEq tablet Take 1 Tablet by mouth three (3) times daily for 3 days. 9 Tablet 0       Past History     Past Medical History:  No past medical history on file. Past Surgical History:  No past surgical history on file. Family History:  No family history on file.     Social History:  Social History     Tobacco Use    Smoking status: Not on file    Smokeless tobacco: Not on file   Substance Use Topics    Alcohol use: Not on file    Drug use: Not on file       Allergies:  No Known Allergies    Review of Systems   Review of Systems    In addition to that documented in the HPI above  All other review of systems negative    Constitutional: Denies fevers or chills  Eyes: Denies vision changes  ENMT: Denies sore throat  CV: Denies chest pain  Resp: Denies SOB  GI: Denies vomiting or diarrhea  : Denies painful urination  MSK: Denies recent trauma  Skin: Denies new rashes  Neuro: Denies new numbness or tingling or weakness  Endocrine: Denies polyuria  Heme: Denies bleeding disorders    Physical Exam     Vitals:    11/22/21 0130 11/22/21 0236 11/22/21 0257 11/22/21 0327   BP: 118/64 117/80 118/78    Pulse:  92 84    Resp: 17 17 17    Temp:  98.5 °F (36.9 °C) 98.6 °F (37 °C) 98 °F (36.7 °C)   SpO2: 95% 96% 98%    Weight:       Height:         Physical Exam    Nursing notes and vital signs reviewed  General: Patient is awake and alert, resting comfortably in no acute distress  Head: Normocephalic, Atraumatic  Eyes: EOMI, no conjunctival pallor  Neck: Supple, Normal external exam  Cardiovascular: RRR, no murmur auscultated, warm, well-perfused extremities  Chest: Normal work of breathing and chest excursion bilaterally  Respiratory: Patient is in no respiratory distress, lungs CTAB  Abdomen: Soft, TTP appropriately around incision site, mild induration  Back: No evidence of trauma or deformity  Extremities: No evidence of trauma or deformity, no LE edema  Skin: Warm and dry, intact  Neuro: Alert and appropriate, CN intact, normal speech, strength and sensation full and symmetric bilaterally, normal gait, normal coordination  Psychiatric: Normal mood and affect    Medical Decision Making   I am the first provider for this patient. I reviewed the vital signs, available nursing notes, past medical history, past surgical history, family history and social history. Vital Signs-Reviewed the patient's vital signs.   Visit Vitals  /78   Pulse 84   Temp 98 °F (36.7 °C)   Resp 17   Ht 5' 4\" (1.626 m)   Wt 76.4 kg (168 lb 6.4 oz)   SpO2 98%   BMI 28.91 kg/m²       Pulse Oximetry Analysis - 98% on room air     Cardiac Monitor:  Rate: 70 bpm  Rhythm: nsr    EKG interpretation: (Preliminary)  Interpreted by the EP. Provider Notes (Medical Decision Making):   Nathen Aguilar is a 27 y.o. female with recent , here with concerns about abdominal pain around her scar, as well as fatigue, generalized malaise. Will obtain CT chest abdomen pelvis correction abdomen pelvis for concerns for fever, rigors, considered but do not suspect peritonitis. Procedures:  Critical Care  Performed by: Mekhi Ramirez MD  Authorized by: Mekhi Ramirez MD     Critical care provider statement:     Critical care time (minutes):  35    Critical care was necessary to treat or prevent imminent or life-threatening deterioration of the following conditions:  Metabolic crisis (blood loss, symptomatic anemia)    Critical care was time spent personally by me on the following activities:  Evaluation of patient's response to treatment, discussions with primary provider, re-evaluation of patient's condition, pulse oximetry and development of treatment plan with patient or surrogate        ED Course:   ED Course as of 21 0416      0017 Leukocytosis note, will give rocephin [DM]   0025 Potassium(!): 3.0 [DM]   0124 Anemia noted. Recent 8.9 after delivery [DM]   0128 Discussed risks and benefits of blood transfusion, to include reaction, infection. Patient wishes to proceed, will transfuse 1 unit PRBCs for dizziness, fatigue, shortness of breath upon walking. [DM]      ED Course User Index  [DM] Mekhi Ramirez MD     4:16 AM   Consented for blood, transfusing    4:34 AM  Will plan to admit for further observation and IV medication treatment. The patient understands and agrees with the plan. Spoke with Dr. Vargas Golden the on call admitting clinician who agrees to admit patient. Appreciate the assistance in the care of this patient. Spoke to Midwife, Henna Gamez, will stop by and see patient. Appreciate assistance.      Vitals Review/addressed -     Diagnostic Study Results     Orders Placed This Encounter    CBC WITH AUTOMATED DIFF     Standing Status:   Standing     Number of Occurrences:   1    BASIC METABOLIC PANEL     Standing Status:   Standing     Number of Occurrences:   1    URINALYSIS W/ RFLX MICROSCOPIC     Standing Status:   Standing     Number of Occurrences:   1    CK     Standing Status:   Standing     Number of Occurrences:   1    URINE MICROSCOPIC ONLY     Standing Status:   Standing     Number of Occurrences:   1    TRANSFUSE PACKED RBC'S, 1 Units     Standing Status:   Standing     Number of Occurrences:   1     Order Specific Question:   Reason for transfusion     Answer:   Hgb LESS THAN 7 g/dL with signs or symptoms of anemia.  CRITICAL CARE (ASAP ONLY)     This order was created via procedure documentation     Standing Status:   Standing     Number of Occurrences:   1    TYPE & SCREEN     ENTER SURGERY DATE IF FOR PRE-OP TESTING. Standing Status:   Standing     Number of Occurrences:   1     Order Specific Question:   Has patient been transfused or pregnant in the last 3 mos. ? Answer:   Unknown    RBC, ALLOCATE, 1 Units Date needed for transfusion: 11/22/2021     Standing Status:   Standing     Number of Occurrences:   1     Order Specific Question:   Date needed for transfusion     Answer:   11/22/2021    cefTRIAXone (ROCEPHIN) 1 g in sterile water (preservative free) 10 mL IV syringe     Order Specific Question:   Antibiotic Indications     Answer:   Skin and Soft Tissue Infection    lactated ringers bolus infusion 1,000 mL    potassium chloride (K-DUR, KLOR-CON M20) SR tablet 40 mEq    ondansetron (ZOFRAN) injection 4 mg    acetaminophen (TYLENOL) tablet 1,000 mg    cephALEXin (Keflex) 500 mg capsule     Sig: Take 1 Capsule by mouth four (4) times daily for 10 days. Dispense:  40 Capsule     Refill:  0    potassium chloride (K-DUR, KLOR-CON M20) 20 mEq tablet     Sig: Take 1 Tablet by mouth three (3) times daily for 3 days. Dispense:  9 Tablet     Refill:  0    0.9% sodium chloride infusion 250 mL    IP CONSULT TO HOSPITALIST     Standing Status:   Standing     Number of Occurrences:   1     Order Specific Question:   Reason for Consult: Answer:   TO ADMIT     Order Specific Question:   Did you call or speak to the consulting provider? Answer:   No     Order Specific Question:   Consult To     Answer:   hospitalist     Order Specific Question:   Schedule When? Answer:   TODAY       Labs -     Recent Results (from the past 12 hour(s))   CBC WITH AUTOMATED DIFF    Collection Time: 11/21/21 11:40 PM   Result Value Ref Range    WBC 16.8 (H) 4.6 - 13.2 K/uL    RBC 2.29 (L) 4.20 - 5.30 M/uL    HGB 7.0 (L) 12.0 - 16.0 g/dL    HCT 21.4 (L) 35.0 - 45.0 %    MCV 93.4 78.0 - 100.0 FL    MCH 30.6 24.0 - 34.0 PG    MCHC 32.7 31.0 - 37.0 g/dL    RDW 14.3 11.6 - 14.5 %    PLATELET 555 (H) 934 - 420 K/uL    MPV 8.5 (L) 9.2 - 11.8 FL    NRBC 0.3 (H) 0  WBC    ABSOLUTE NRBC 0.05 (H) 0.00 - 0.01 K/uL    NEUTROPHILS 84 (H) 40 - 73 %    BAND NEUTROPHILS 4 0 - 5 %    LYMPHOCYTES 9 (L) 21 - 52 %    MONOCYTES 2 (L) 3 - 10 %    EOSINOPHILS 0 0 - 5 %    BASOPHILS 1 0 - 2 %    IMMATURE GRANULOCYTES 0 %    ABS. NEUTROPHILS 14.8 (H) 1.8 - 8.0 K/UL    ABS. LYMPHOCYTES 1.5 0.9 - 3.6 K/UL    ABS. MONOCYTES 0.3 0.05 - 1.2 K/UL    ABS. EOSINOPHILS 0.0 0.0 - 0.4 K/UL    ABS. BASOPHILS 0.2 (H) 0.0 - 0.1 K/UL    ABS. IMM.  GRANS. 0.0 K/UL    DF MANUAL      PLATELET COMMENTS Increased Platelets      RBC COMMENTS HYPOCHROMIA  SLIGHT       METABOLIC PANEL, BASIC    Collection Time: 11/21/21 11:40 PM   Result Value Ref Range    Sodium 141 136 - 145 mmol/L    Potassium 3.0 (L) 3.5 - 5.5 mmol/L    Chloride 110 100 - 111 mmol/L    CO2 23 21 - 32 mmol/L    Anion gap 8 3.0 - 18 mmol/L    Glucose 89 74 - 99 mg/dL    BUN 14 7.0 - 18 MG/DL    Creatinine 0.55 (L) 0.6 - 1.3 MG/DL    BUN/Creatinine ratio 25 (H) 12 - 20      GFR est AA >60 >60 ml/min/1.73m2    GFR est non-AA >60 >60 ml/min/1.73m2    Calcium 8.0 (L) 8.5 - 10.1 MG/DL   URINALYSIS W/ RFLX MICROSCOPIC    Collection Time: 21 11:40 PM   Result Value Ref Range    Color 0      Appearance TURBID      Specific gravity 1.025 1.005 - 1.030      pH (UA) 6.5 5.0 - 8.0      Protein 100 (A) NEG mg/dL    Glucose Negative NEG mg/dL    Ketone TRACE (A) NEG mg/dL    Bilirubin Negative NEG      Blood LARGE (A) NEG      Urobilinogen 1.0 0.2 - 1.0 EU/dL    Nitrites Negative NEG      Leukocyte Esterase LARGE (A) NEG     CK    Collection Time: 21 11:40 PM   Result Value Ref Range    CK 38 26 - 192 U/L   URINE MICROSCOPIC ONLY    Collection Time: 21 11:40 PM   Result Value Ref Range    WBC TOO NUMEROUS TO COUNT 0 - 5 /hpf    RBC 4 to 10 0 - 5 /hpf    Epithelial cells FEW 0 - 5 /lpf    Bacteria 2+ (A) NEG /hpf   RBC, ALLOCATE    Collection Time: 21  1:30 AM   Result Value Ref Range    HISTORY CHECKED? Historical check performed    TYPE & SCREEN    Collection Time: 21  1:36 AM   Result Value Ref Range    Crossmatch Expiration 2021,2359     ABO/Rh(D) Sarah Finer POSITIVE     Antibody screen NEG     Unit number [id number]     Blood component type [facility] LR     Unit division 00     Status of unit ISSUED     Crossmatch result Compatible        Radiologic Studies -   No orders to display     CT Results  (Last 48 hours)    None        CXR Results  (Last 48 hours)    None          Disposition     Disposition:  Admit    CLINICAL IMPRESSION:    1. Hypokalemia    2. Acute cystitis without hematuria    3. H/O  section    4.  Anemia, unspecified type        It should be noted that I will be the provider of record for this patient  Mirian Ibarra MD    Follow-up Information     Follow up With Specialties Details Why [address]    [facility] EMERGENCY DEPT Emergency Medicine Go to  If symptoms worsen [address]  [phone]    Jania Posada MD Urology Call in 2 days  [phone] [address] Family Medicine at [facility]  Call  if you do not have a  [facility] [address]          Current Discharge Medication List      START taking these medications    Details   cephALEXin (Keflex) 500 mg capsule Take 1 Capsule by mouth four (4) times daily for 10 days. Qty: 40 Capsule, Refills: 0  Start date: 11/22/2021, End date: 12/2/2021      potassium chloride (K-DUR, KLOR-CON M20) 20 mEq tablet Take 1 Tablet by mouth three (3) times daily for 3 days. Qty: 9 Tablet, Refills: 0  Start date: 11/22/2021, End date: 11/25/2021             Please note that this dictation was completed with [employer], the computer voice recognition software. Quite often unanticipated grammatical, syntax, homophones, and other interpretive errors are inadvertently transcribed by the computer software. Please disregard these errors. Please excuse any errors that have escaped final proofreading.

## 2021-11-22 NOTE — LACTATION NOTE
1140 patient is hospital readmission. Per patient, is 1 week postpartum and has been mostly pumping. Patient stated \"due to flat and invert nipples, I've been mostly pumping and not latching\". Patient has been set up with hospital pump. Encouraged patient to frequently pump and fully soften breasts during the pumping session. Discussed pumped milk safety. All questions and concerns were answered and addressed. Will remain available as needed.

## 2021-11-22 NOTE — H&P
History & Physical    Patient: Surjit Archibald MRN: [id number]  CSN: [id number]    YOB: 1995  Age: 27 y.o. Sex: female      DOA: 2021    Chief Complaint:   Chief Complaint   Patient presents with    Post OP Complication          HPI:     Surjit Archibald is a 27 y.o.  female who G1, P1 postpartum 1 week 9 pound baby boy post  transabdominal presents to the emergency room with increased dyspnea on exertion fevers chills and back pain. Patient was discharged with a hemoglobin of 9 and her hemoglobin in the emergency room was found to be 7 she has been passing large clots of blood she called OB who gave her reassurance. Despite that patient has had increasing fatigue chills and general malaise and came to be evaluated. Her son is now having diarrhea but otherwise she feels well she is pumping and breast-feeding. She had her Pfizer vaccine in  denies any Covid exposures  In the emergency room she has received 1 unit of blood but is still feeling weak with general malaise and dyspnea also complained of lower extremity edema did not have any preeclampsia postpartum hypertension. In the emergency room she had a brief episode of hypoxemia during blood transfusion that required oxygen she is now better. No past medical history on file. History reviewed. No pertinent surgical history. No family history on file. Social History     Socioeconomic History    Marital status:        Prior to Admission medications    Medication Sig Start Date End Date Taking? Authorizing Provider   cephALEXin (Keflex) 500 mg capsule Take 1 Capsule by mouth four (4) times daily for 10 days. 21 Yes Molina Mcneil MD   potassium chloride (K-DUR, KLOR-CON M20) 20 mEq tablet Take 1 Tablet by mouth three (3) times daily for 3 days.  21 Yes Molina Mcneil MD       No Known Allergies      Review of Systems  GENERAL: Patient alert, awake and oriented times 3, able to communicate full sentences and not in distress. HEENT: No change in vision, no earache, tinnitus, sore throat or sinus congestion. NECK: No pain or stiffness. PULMONARY:+ shortness of breath, cough or wheeze. Cardiovascular: no pnd or orthopnea, no CP  GASTROINTESTINAL: + abdominal pain, nausea, vomiting or diarrhea, melena or bright red blood per rectum. GENITOURINARY: No urinary frequency, urgency, hesitancy or dysuria. MUSCULOSKELETAL: No joint or muscle pain, no back pain, no recent trauma. DERMATOLOGIC: No rash, no itching, no lesions. Mild redness around her incision area and induration on the right side  ENDOCRINE: No polyuria, polydipsia, no heat or cold intolerance. No recent change in weight. HEMATOLOGICAL+ anemia or easy bruising or bleeding. NEUROLOGIC: No headache, seizures, numbness, tingling + weakness. Physical Exam:     Physical Exam:  Visit Vitals  BP (!) 144/92   Pulse 84   Temp 98 °F (36.7 °C)   Resp 18   Ht 5' 4\" (1.626 m)   Wt 76.4 kg (168 lb 6.4 oz)   SpO2 98%   BMI 28.91 kg/m²    O2 Flow Rate (L/min): 2 l/min O2 Device: Nasal cannula    Temp (24hrs), Av.3 °F (36.8 °C), Min:98 °F (36.7 °C), Max:98.6 °F (37 °C)     1901 -  0700  In: 1000 [I.V.:1000]  Out: -    No intake/output data recorded. General:  Alert, cooperative, no distress, appears stated age. Head: Normocephalic, without obvious abnormality, atraumatic. Eyes:  Conjunctivae/corneas clear. PERRL, EOMs intact. Nose: Nares normal. No drainage or sinus tenderness. Neck: Supple, symmetrical, trachea midline, no adenopathy, thyroid: no enlargement, no carotid bruit and no JVD. Lungs:   Clear to auscultation bilaterally. Diminished breath sounds   Heart:  Regular rate and rhythm, S1, S2 normal.  Relative tachycardia     Abdomen: Soft, non-tender.  Bowel sounds normal.  Surgical scar with mild induration on the right no CVA tenderness   Extremities: Extremities normal, atraumatic, no cyanosis +2 pitting edema. Pulses: 2+ and symmetric all extremities. Skin:  No rashes or lesions mild erythema transabdominal area   Neurologic: AAOx3, No focal motor or sensory deficit. Recent Results (from the past 12 hour(s))   CBC WITH AUTOMATED DIFF    Collection Time: 11/21/21 11:40 PM   Result Value Ref Range    WBC 16.8 (H) 4.6 - 13.2 K/uL    RBC 2.29 (L) 4.20 - 5.30 M/uL    HGB 7.0 (L) 12.0 - 16.0 g/dL    HCT 21.4 (L) 35.0 - 45.0 %    MCV 93.4 78.0 - 100.0 FL    MCH 30.6 24.0 - 34.0 PG    MCHC 32.7 31.0 - 37.0 g/dL    RDW 14.3 11.6 - 14.5 %    PLATELET 250 (H) 635 - 420 K/uL    MPV 8.5 (L) 9.2 - 11.8 FL    NRBC 0.3 (H) 0  WBC    ABSOLUTE NRBC 0.05 (H) 0.00 - 0.01 K/uL    NEUTROPHILS 84 (H) 40 - 73 %    BAND NEUTROPHILS 4 0 - 5 %    LYMPHOCYTES 9 (L) 21 - 52 %    MONOCYTES 2 (L) 3 - 10 %    EOSINOPHILS 0 0 - 5 %    BASOPHILS 1 0 - 2 %    IMMATURE GRANULOCYTES 0 %    ABS. NEUTROPHILS 14.8 (H) 1.8 - 8.0 K/UL    ABS. LYMPHOCYTES 1.5 0.9 - 3.6 K/UL    ABS. MONOCYTES 0.3 0.05 - 1.2 K/UL    ABS. EOSINOPHILS 0.0 0.0 - 0.4 K/UL    ABS. BASOPHILS 0.2 (H) 0.0 - 0.1 K/UL    ABS. IMM.  GRANS. 0.0 K/UL    DF MANUAL      PLATELET COMMENTS Increased Platelets      RBC COMMENTS HYPOCHROMIA  SLIGHT       METABOLIC PANEL, BASIC    Collection Time: 11/21/21 11:40 PM   Result Value Ref Range    Sodium 141 136 - 145 mmol/L    Potassium 3.0 (L) 3.5 - 5.5 mmol/L    Chloride 110 100 - 111 mmol/L    CO2 23 21 - 32 mmol/L    Anion gap 8 3.0 - 18 mmol/L    Glucose 89 74 - 99 mg/dL    BUN 14 7.0 - 18 MG/DL    Creatinine 0.55 (L) 0.6 - 1.3 MG/DL    BUN/Creatinine ratio 25 (H) 12 - 20      GFR est AA >60 >60 ml/min/1.73m2    GFR est non-AA >60 >60 ml/min/1.73m2    Calcium 8.0 (L) 8.5 - 10.1 MG/DL   URINALYSIS W/ RFLX MICROSCOPIC    Collection Time: 11/21/21 11:40 PM   Result Value Ref Range    Color 0      Appearance TURBID      Specific gravity 1.025 1.005 - 1.030      pH (UA) 6.5 5.0 - 8.0      Protein 100 (A) NEG mg/dL    Glucose Negative NEG mg/dL    Ketone TRACE (A) NEG mg/dL    Bilirubin Negative NEG      Blood LARGE (A) NEG      Urobilinogen 1.0 0.2 - 1.0 EU/dL    Nitrites Negative NEG      Leukocyte Esterase LARGE (A) NEG     CK    Collection Time: 11/21/21 11:40 PM   Result Value Ref Range    CK 38 26 - 192 U/L   URINE MICROSCOPIC ONLY    Collection Time: 11/21/21 11:40 PM   Result Value Ref Range    WBC TOO NUMEROUS TO COUNT 0 - 5 /hpf    RBC 4 to 10 0 - 5 /hpf    Epithelial cells FEW 0 - 5 /lpf    Bacteria 2+ (A) NEG /hpf   RBC, ALLOCATE    Collection Time: 11/22/21  1:30 AM   Result Value Ref Range    HISTORY CHECKED? Historical check performed    TYPE & SCREEN    Collection Time: 11/22/21  1:36 AM   Result Value Ref Range    Crossmatch Expiration 11/25/2021,2359     ABO/Rh(D) Nilesh Mcgee POSITIVE     Antibody screen NEG     Unit number [id number]     Blood component type RC LR     Unit division 00     Status of unit ISSUED     Crossmatch result Compatible      Labs Reviewed: All lab results for the last 24 hours reviewed. and EKG    Procedures/imaging: see electronic medical records for all procedures/Xrays and details which were not copied into this note but were reviewed prior to creation of Plan      Assessment/Plan     1. UTI possible early Nishant will check CT of abdomen pelvis start Rocephin IV  2. Postpartum anemia secondary to menorrhagia GI GYN was consulted in emergency room and will follow she is given 1 unit of blood will transfuse if hemoglobin is less than 7 if hemoglobin is above 7% still symptomatic will give iron  3. Dyspnea likely from anemia we will also check CTA  4. Lower extremity edema postpartum check duplex lower extremity  5. Leukocytosis likely from urine monitor daily  6.   Postpartum with lactation will need breast pump    DVT/GI Prophylaxis: SCD's        Ruthie Bal MD  11/22/2021 5:24 AM

## 2021-11-23 ENCOUNTER — APPOINTMENT (OUTPATIENT)
Dept: ULTRASOUND IMAGING | Age: 26
DRG: 776 | End: 2021-11-23
Attending: HOSPITALIST
Payer: COMMERCIAL

## 2021-11-23 VITALS
TEMPERATURE: 98.3 F | RESPIRATION RATE: 18 BRPM | SYSTOLIC BLOOD PRESSURE: 122 MMHG | HEIGHT: 64 IN | DIASTOLIC BLOOD PRESSURE: 75 MMHG | WEIGHT: 168 LBS | OXYGEN SATURATION: 97 % | HEART RATE: 70 BPM | BODY MASS INDEX: 28.68 KG/M2

## 2021-11-23 PROBLEM — J81.1 PULMONARY EDEMA: Status: ACTIVE | Noted: 2021-11-23

## 2021-11-23 LAB
ABO + RH BLD: NORMAL
ANION GAP SERPL CALC-SCNC: 9 MMOL/L (ref 3–18)
BASOPHILS # BLD: 0 K/UL (ref 0–0.1)
BASOPHILS NFR BLD: 0 % (ref 0–2)
BLD PROD TYP BPU: NORMAL
BLOOD GROUP ANTIBODIES SERPL: NORMAL
BPU ID: NORMAL
BUN SERPL-MCNC: 12 MG/DL (ref 7–18)
BUN/CREAT SERPL: 18 (ref 12–20)
CALCIUM SERPL-MCNC: 8 MG/DL (ref 8.5–10.1)
CHLORIDE SERPL-SCNC: 110 MMOL/L (ref 100–111)
CO2 SERPL-SCNC: 24 MMOL/L (ref 21–32)
CREAT SERPL-MCNC: 0.65 MG/DL (ref 0.6–1.3)
CROSSMATCH RESULT,%XM: NORMAL
DIFFERENTIAL METHOD BLD: ABNORMAL
EOSINOPHIL # BLD: 0.2 K/UL (ref 0–0.4)
EOSINOPHIL NFR BLD: 1 % (ref 0–5)
ERYTHROCYTE [DISTWIDTH] IN BLOOD BY AUTOMATED COUNT: 14.7 % (ref 11.6–14.5)
GLUCOSE SERPL-MCNC: 65 MG/DL (ref 74–99)
HCT VFR BLD AUTO: 24 % (ref 35–45)
HGB BLD-MCNC: 8 G/DL (ref 12–16)
IMM GRANULOCYTES # BLD AUTO: 1.1 K/UL (ref 0–0.04)
IMM GRANULOCYTES NFR BLD AUTO: 7 % (ref 0–0.5)
LYMPHOCYTES # BLD: 1.7 K/UL (ref 0.9–3.6)
LYMPHOCYTES NFR BLD: 11 % (ref 21–52)
MAGNESIUM SERPL-MCNC: 1.9 MG/DL (ref 1.6–2.6)
MCH RBC QN AUTO: 30.8 PG (ref 24–34)
MCHC RBC AUTO-ENTMCNC: 33.3 G/DL (ref 31–37)
MCV RBC AUTO: 92.3 FL (ref 78–100)
MONOCYTES # BLD: 1.1 K/UL (ref 0.05–1.2)
MONOCYTES NFR BLD: 7 % (ref 3–10)
NEUTS SEG # BLD: 11.4 K/UL (ref 1.8–8)
NEUTS SEG NFR BLD: 74 % (ref 40–73)
NRBC # BLD: 0.06 K/UL (ref 0–0.01)
NRBC BLD-RTO: 0.4 PER 100 WBC
PLATELET # BLD AUTO: 508 K/UL (ref 135–420)
PMV BLD AUTO: 8.5 FL (ref 9.2–11.8)
POTASSIUM SERPL-SCNC: 3.6 MMOL/L (ref 3.5–5.5)
RBC # BLD AUTO: 2.6 M/UL (ref 4.2–5.3)
RBC MORPH BLD: ABNORMAL
SODIUM SERPL-SCNC: 143 MMOL/L (ref 136–145)
SPECIMEN EXP DATE BLD: NORMAL
STATUS OF UNIT,%ST: NORMAL
UNIT DIVISION, %UDIV: 0
WBC # BLD AUTO: 15.5 K/UL (ref 4.6–13.2)

## 2021-11-23 PROCEDURE — G0378 HOSPITAL OBSERVATION PER HR: HCPCS

## 2021-11-23 PROCEDURE — 99223 1ST HOSP IP/OBS HIGH 75: CPT | Performed by: INTERNAL MEDICINE

## 2021-11-23 PROCEDURE — 74011250637 HC RX REV CODE- 250/637: Performed by: HOSPITALIST

## 2021-11-23 PROCEDURE — 74011250636 HC RX REV CODE- 250/636: Performed by: HOSPITALIST

## 2021-11-23 PROCEDURE — 74011250636 HC RX REV CODE- 250/636: Performed by: INTERNAL MEDICINE

## 2021-11-23 PROCEDURE — 76705 ECHO EXAM OF ABDOMEN: CPT

## 2021-11-23 PROCEDURE — 74011000250 HC RX REV CODE- 250: Performed by: INTERNAL MEDICINE

## 2021-11-23 PROCEDURE — 83735 ASSAY OF MAGNESIUM: CPT

## 2021-11-23 PROCEDURE — 36415 COLL VENOUS BLD VENIPUNCTURE: CPT

## 2021-11-23 PROCEDURE — 85025 COMPLETE CBC W/AUTO DIFF WBC: CPT

## 2021-11-23 PROCEDURE — 74011250637 HC RX REV CODE- 250/637: Performed by: ADVANCED PRACTICE MIDWIFE

## 2021-11-23 PROCEDURE — 80048 BASIC METABOLIC PNL TOTAL CA: CPT

## 2021-11-23 RX ORDER — IBUPROFEN 800 MG/1
800 TABLET ORAL
Qty: 9 TABLET | Refills: 0 | Status: SHIPPED | OUTPATIENT
Start: 2021-11-23 | End: 2021-11-29

## 2021-11-23 RX ORDER — AMOXICILLIN 250 MG/1
250 CAPSULE ORAL 3 TIMES DAILY
Qty: 15 CAPSULE | Refills: 0 | Status: SHIPPED | OUTPATIENT
Start: 2021-11-23 | End: 2021-11-29

## 2021-11-23 RX ORDER — POTASSIUM CHLORIDE 20 MEQ/1
40 TABLET, EXTENDED RELEASE ORAL
Status: COMPLETED | OUTPATIENT
Start: 2021-11-23 | End: 2021-11-23

## 2021-11-23 RX ORDER — FUROSEMIDE 20 MG/1
20 TABLET ORAL DAILY
Qty: 5 TABLET | Refills: 0 | Status: SHIPPED | OUTPATIENT
Start: 2021-11-23 | End: 2021-11-29

## 2021-11-23 RX ORDER — LANOLIN ALCOHOL/MO/W.PET/CERES
325 CREAM (GRAM) TOPICAL 2 TIMES DAILY WITH MEALS
Qty: 60 TABLET | Refills: 0 | Status: SHIPPED | OUTPATIENT
Start: 2021-11-23

## 2021-11-23 RX ADMIN — IBUPROFEN 800 MG: 400 TABLET, FILM COATED ORAL at 15:58

## 2021-11-23 RX ADMIN — FERROUS SULFATE TAB 325 MG (65 MG ELEMENTAL FE) 325 MG: 325 (65 FE) TAB at 09:03

## 2021-11-23 RX ADMIN — POTASSIUM CHLORIDE 40 MEQ: 20 TABLET, EXTENDED RELEASE ORAL at 15:57

## 2021-11-23 RX ADMIN — WATER 1 G: 1 INJECTION INTRAMUSCULAR; INTRAVENOUS; SUBCUTANEOUS at 07:45

## 2021-11-23 RX ADMIN — FUROSEMIDE 20 MG: 10 INJECTION, SOLUTION INTRAMUSCULAR; INTRAVENOUS at 09:03

## 2021-11-23 RX ADMIN — Medication 10 ML: at 07:45

## 2021-11-23 RX ADMIN — IBUPROFEN 800 MG: 400 TABLET, FILM COATED ORAL at 09:03

## 2021-11-23 NOTE — CONSULTS
Cardiolology  Inpatient Consult      Patient: Aline Singleton               Sex: female          DOA: 2021       YOB: 1995      Age:  27 y.o.        LOS:  LOS: 0 days      Aline Singleton is a 27 y.o. female admitted for Anemia [D64.9]  UTI (urinary tract infection) [N39.0]     Recommendations:  · Surveillance      Impression:  · Normal cardiac exam and normal echo  · UTI  · Anemia  · S/P     Patient Active Problem List    Diagnosis Date Noted    UTI (urinary tract infection) 2021    Anemia 2021    Leukocytosis 2021    Hypokalemia 2021    At risk for breastfeeding difficulty 2021      Luis Durham MD  No past medical history on file. Past Surgical History:   Procedure Laterality Date    HX  SECTION       No Known Allergies   No family history on file. Current Facility-Administered Medications   Medication Dose Route Frequency    0.9% sodium chloride infusion 250 mL  250 mL IntraVENous PRN    sodium chloride (NS) flush 5-40 mL  5-40 mL IntraVENous Q8H    sodium chloride (NS) flush 5-40 mL  5-40 mL IntraVENous PRN    acetaminophen (TYLENOL) tablet 650 mg  650 mg Oral Q6H PRN    Or    acetaminophen (TYLENOL) suppository 650 mg  650 mg Rectal Q6H PRN    polyethylene glycol (MIRALAX) packet 17 g  17 g Oral DAILY PRN    ondansetron (ZOFRAN ODT) tablet 4 mg  4 mg Oral Q8H PRN    Or    ondansetron (ZOFRAN) injection 4 mg  4 mg IntraVENous Q6H PRN    cefTRIAXone (ROCEPHIN) 1 g in sterile water (preservative free) 10 mL IV syringe  1 g IntraVENous Q24H    ibuprofen (MOTRIN) tablet 800 mg  800 mg Oral TID    ferrous sulfate tablet 325 mg  1 Tablet Oral BID WITH MEALS    furosemide (LASIX) injection 20 mg  20 mg IntraVENous BID         Review of Symptoms:  Review of Systems  GENERAL: Patient alert, awake and oriented times 3, able to communicate full sentences and not in distress.    HEENT: No change in vision, no earache, tinnitus, sore throat or sinus congestion. NECK: No pain or stiffness. PULMONARY:+ shortness of breath, cough or wheeze. Cardiovascular: no pnd or orthopnea, no CP  GASTROINTESTINAL: + abdominal pain, nausea, vomiting or diarrhea, melena or bright red blood per rectum. GENITOURINARY: No urinary frequency, urgency, hesitancy or dysuria. MUSCULOSKELETAL: No joint or muscle pain, no back pain, no recent trauma. DERMATOLOGIC: No rash, no itching, no lesions. Mild redness around her incision area and induration on the right side  ENDOCRINE: No polyuria, polydipsia, no heat or cold intolerance. No recent change in weight. HEMATOLOGICAL+ anemia or easy bruising or bleeding. NEUROLOGIC: No headache, seizures, numbness, tingling + weakness. Subjective:  Louis Sinclair is a 27 y.o.  female who G1, P1 postpartum 1 week 9 pound baby boy post  transabdominal presents to the emergency room with increased dyspnea on exertion fevers chills and back pain. Patient was discharged with a hemoglobin of 9 and her hemoglobin in the emergency room was found to be 7 she has been passing large clots of blood she called OB who gave her reassurance. Despite that patient has had increasing fatigue chills and general malaise and came to be evaluated. Her son is now having diarrhea but otherwise she feels well she is pumping and breast-feeding. She had her Pfizer vaccine in  denies any Covid exposures  In the emergency room she has received 1 unit of blood but is still feeling weak with general malaise and dyspnea also complained of lower extremity edema did not have any preeclampsia postpartum hypertension. In the emergency room she had a brief episode of hypoxemia during blood transfusion that required oxygen she is now better. Patient had an echo which was normal with EF in the normal range and no wall motion abnormalities. There is no history of any cardiac issues.        Cardiac risk factors: -.      Physical Exam    Visit Vitals  /75 (BP 1 Location: Left upper arm, BP Patient Position: At rest)   Pulse 70   Temp 98.3 °F (36.8 °C)   Resp 18   Ht 5' 4\" (1.626 m)   Wt 76.2 kg (168 lb)   SpO2 97%   BMI 28.84 kg/m²                                       Cardiographics    Telemetry: normal sinus rhythm  ECG: No acute change  Echocardiogram: Normal and reviewed by me    Recent radiology, intake/output and wt reviewed    Labs:   Recent Results (from the past 48 hour(s))   CBC WITH AUTOMATED DIFF    Collection Time: 11/21/21 11:40 PM   Result Value Ref Range    WBC 16.8 (H) 4.6 - 13.2 K/uL    RBC 2.29 (L) 4.20 - 5.30 M/uL    HGB 7.0 (L) 12.0 - 16.0 g/dL    HCT 21.4 (L) 35.0 - 45.0 %    MCV 93.4 78.0 - 100.0 FL    MCH 30.6 24.0 - 34.0 PG    MCHC 32.7 31.0 - 37.0 g/dL    RDW 14.3 11.6 - 14.5 %    PLATELET 027 (H) 964 - 420 K/uL    MPV 8.5 (L) 9.2 - 11.8 FL    NRBC 0.3 (H) 0  WBC    ABSOLUTE NRBC 0.05 (H) 0.00 - 0.01 K/uL    NEUTROPHILS 84 (H) 40 - 73 %    BAND NEUTROPHILS 4 0 - 5 %    LYMPHOCYTES 9 (L) 21 - 52 %    MONOCYTES 2 (L) 3 - 10 %    EOSINOPHILS 0 0 - 5 %    BASOPHILS 1 0 - 2 %    IMMATURE GRANULOCYTES 0 %    ABS. NEUTROPHILS 14.8 (H) 1.8 - 8.0 K/UL    ABS. LYMPHOCYTES 1.5 0.9 - 3.6 K/UL    ABS. MONOCYTES 0.3 0.05 - 1.2 K/UL    ABS. EOSINOPHILS 0.0 0.0 - 0.4 K/UL    ABS. BASOPHILS 0.2 (H) 0.0 - 0.1 K/UL    ABS. IMM.  GRANS. 0.0 K/UL    DF MANUAL      PLATELET COMMENTS Increased Platelets      RBC COMMENTS HYPOCHROMIA  SLIGHT       METABOLIC PANEL, BASIC    Collection Time: 11/21/21 11:40 PM   Result Value Ref Range    Sodium 141 136 - 145 mmol/L    Potassium 3.0 (L) 3.5 - 5.5 mmol/L    Chloride 110 100 - 111 mmol/L    CO2 23 21 - 32 mmol/L    Anion gap 8 3.0 - 18 mmol/L    Glucose 89 74 - 99 mg/dL    BUN 14 7.0 - 18 MG/DL    Creatinine 0.55 (L) 0.6 - 1.3 MG/DL    BUN/Creatinine ratio 25 (H) 12 - 20      GFR est AA >60 >60 ml/min/1.73m2    GFR est non-AA >60 >60 ml/min/1.73m2    Calcium 8.0 (L) 8.5 - 10.1 MG/DL   URINALYSIS W/ RFLX MICROSCOPIC    Collection Time: 11/21/21 11:40 PM   Result Value Ref Range    Color 0      Appearance TURBID      Specific gravity 1.025 1.005 - 1.030      pH (UA) 6.5 5.0 - 8.0      Protein 100 (A) NEG mg/dL    Glucose Negative NEG mg/dL    Ketone TRACE (A) NEG mg/dL    Bilirubin Negative NEG      Blood LARGE (A) NEG      Urobilinogen 1.0 0.2 - 1.0 EU/dL    Nitrites Negative NEG      Leukocyte Esterase LARGE (A) NEG     CK    Collection Time: 11/21/21 11:40 PM   Result Value Ref Range    CK 38 26 - 192 U/L   URINE MICROSCOPIC ONLY    Collection Time: 11/21/21 11:40 PM   Result Value Ref Range    WBC TOO NUMEROUS TO COUNT 0 - 5 /hpf    RBC 4 to 10 0 - 5 /hpf    Epithelial cells FEW 0 - 5 /lpf    Bacteria 2+ (A) NEG /hpf   RBC, ALLOCATE    Collection Time: 11/22/21  1:30 AM   Result Value Ref Range    HISTORY CHECKED? Historical check performed    TYPE & SCREEN    Collection Time: 11/22/21  1:36 AM   Result Value Ref Range    Crossmatch Expiration 11/25/2021,2359     ABO/Rh(D) O POSITIVE     Antibody screen NEG     Unit number [id number]     Blood component type  LR     Unit division 00     Status of unit TRANSFUSED     Crossmatch result Compatible    CBC WITH AUTOMATED DIFF    Collection Time: 11/22/21  9:45 AM   Result Value Ref Range    WBC 17.0 (H) 4.6 - 13.2 K/uL    RBC 2.66 (L) 4.20 - 5.30 M/uL    HGB 8.2 (L) 12.0 - 16.0 g/dL    HCT 24.8 (L) 35.0 - 45.0 %    MCV 93.2 78.0 - 100.0 FL    MCH 30.8 24.0 - 34.0 PG    MCHC 33.1 31.0 - 37.0 g/dL    RDW 14.7 (H) 11.6 - 14.5 %    PLATELET 183 166 - 505 K/uL    MPV 8.6 (L) 9.2 - 11.8 FL    NRBC 0.4 (H) 0  WBC    ABSOLUTE NRBC 0.06 (H) 0.00 - 0.01 K/uL    NEUTROPHILS 81 (H) 40 - 73 %    BAND NEUTROPHILS 2 0 - 5 %    LYMPHOCYTES 13 (L) 21 - 52 %    MONOCYTES 4 3 - 10 %    EOSINOPHILS 0 0 - 5 %    BASOPHILS 0 0 - 2 %    IMMATURE GRANULOCYTES 0 %    ABS. NEUTROPHILS 14.1 (H) 1.8 - 8.0 K/UL    ABS.  LYMPHOCYTES 2.2 0.9 - 3.6 K/UL    ABS. MONOCYTES 0.7 0.05 - 1.2 K/UL    ABS. EOSINOPHILS 0.0 0.0 - 0.4 K/UL    ABS. BASOPHILS 0.0 0.0 - 0.1 K/UL    ABS. IMM. GRANS. 0.0 K/UL    DF MANUAL      PLATELET COMMENTS ADEQUATE PLATELETS      RBC COMMENTS NORMOCYTIC, NORMOCHROMIC     METABOLIC PANEL, COMPREHENSIVE    Collection Time: 11/22/21  9:45 AM   Result Value Ref Range    Sodium 142 136 - 145 mmol/L    Potassium 3.7 3.5 - 5.5 mmol/L    Chloride 111 100 - 111 mmol/L    CO2 21 21 - 32 mmol/L    Anion gap 10 3.0 - 18 mmol/L    Glucose 80 74 - 99 mg/dL    BUN 10 7.0 - 18 MG/DL    Creatinine 0.56 (L) 0.6 - 1.3 MG/DL    BUN/Creatinine ratio 18 12 - 20      GFR est AA >60 >60 ml/min/1.73m2    GFR est non-AA >60 >60 ml/min/1.73m2    Calcium 7.9 (L) 8.5 - 10.1 MG/DL    Bilirubin, total 0.5 0.2 - 1.0 MG/DL    ALT (SGPT) 16 13 - 56 U/L    AST (SGOT) 12 10 - 38 U/L    Alk.  phosphatase 131 (H) 45 - 117 U/L    Protein, total 5.0 (L) 6.4 - 8.2 g/dL    Albumin 1.9 (L) 3.4 - 5.0 g/dL    Globulin 3.1 2.0 - 4.0 g/dL    A-G Ratio 0.6 (L) 0.8 - 1.7     MAGNESIUM    Collection Time: 11/22/21  9:45 AM   Result Value Ref Range    Magnesium 1.9 1.6 - 2.6 mg/dL   ECHO ADULT COMPLETE    Collection Time: 11/22/21  1:03 PM   Result Value Ref Range    IVSd 0.86 0.6 - 0.9 cm    LVIDd 4.99 3.9 - 5.3 cm    LVIDs 3.03 cm    LVOT d 2.24 cm    LVPWd 0.99 (A) 0.6 - 0.9 cm    Global Longitudinal Strain 2 Chamber -19.1 percent    Global Longitudinal Strain 4 Chamber -15.9 percent    Global Longitudinal Strain Long Axis -17.3 percent    Global Longitudinal Strain -17.4 percent    BP EF 55.1 55 - 100 percent    LV Ejection Fraction MOD 2C 50 percent    LV Ejection Fraction MOD 4C 59 percent    LV ED Vol A2C 169.32 mL    LV ED Vol A4C 139.63 mL    LV ED Vol .68 (A) 56 - 104 mL    LV ES Vol A2C 84.25 mL    LV ES Vol A4C 56.70 mL    LV ES Vol BP 74.38 (A) 19 - 49 mL    LVOT SV 86.0 mL    LVOT Cardiac Output 7.08 liter/minute    LVOT Peak Gradient 4.93 mmHg    Left Ventricular Outflow Tract Mean Gradient 2.71 mmHg    LVOT Peak Velocity 111.03 cm/s    LVOT VTI 21.78 cm    RVIDd 3.60 cm    Tapse 0.43 (A) 1.5 - 2.0 cm    Left Atrium Major Axis 3.22 cm    LA Volume 67.90 22 - 52 mL    LA Area 4C 25.17 cm2    LA Vol 2C 46.83 22 - 52 mL    LA Vol 4C 77.43 (A) 22 - 52 mL    LA Volume DISK BP 64.34 22 - 52 mL    Right Atrial Area 4C 22.61 cm2    Aortic Valve Area by Continuity of Peak Velocity 2.79 cm2    Aortic Valve Area by Continuity of VTI 2.53 cm2    AoV PG 9.87 mmHg    Aortic Valve Systolic Mean Gradient 8.42 mmHg    Aortic Valve Systolic Peak Velocity 914.28 cm/s    AoV VTI 33.94 cm    E/E' ratio (averaged) 9.64     E/E' lateral 8.40     E/E' septal 10.88     LV E' Lateral Velocity 13.99 cm/s    LV E' Septal Velocity 10.80 cm/s    MR Mean Gradient 106.42 mmHg    Mitral Regurgitant Velocity Time Integral 192.88 cm    MV A Nilo 61.75 cm/s    Mitral Valve Deceleration Hale 7.46 meter/second2    Mitral Valve E Wave Deceleration Time 158.17 ms    MV E Nilo 117.55 cm/s    MV E/A 1.90     Triscuspid Valve Regurgitation Peak Gradient 31.19 mmHg    TR Max Velocity 279.23 cm/s    Ao Root D 2.48 cm    IVC proximal 1.75 cm    LV Mass .5 67 - 162 g    LV Mass AL Index 89.8 43 - 95 g/m2    LVES Vol Index BP 40.9 mL/m2    LVED Vol Index BP 91.0 mL/m2    Est. RA Pressure 3.0 mmHg    Left Atrium Minor Axis 1.77 cm    LA Vol Index 37.31 16 - 28 ml/m2    LA Vol Index 25.73 16 - 28 ml/m2    LA Vol Index 42.54 16 - 28 ml/m2    LVED Vol Index A4C 76.7 mL/m2    LVED Vol Index A2C 93.0 mL/m2    LVES Vol Index A4C 31.2 mL/m2    LVES Vol Index A2C 46.3 mL/m2    DAVID/BSA Pk Nilo 1.5 cm2/m2    DAVID/BSA VTI 1.4 cm2/m2   MAGNESIUM    Collection Time: 11/23/21  5:27 AM   Result Value Ref Range    Magnesium 1.9 1.6 - 2.6 mg/dL   CBC WITH AUTOMATED DIFF    Collection Time: 11/23/21  5:27 AM   Result Value Ref Range    WBC 15.5 (H) 4.6 - 13.2 K/uL    RBC 2.60 (L) 4.20 - 5.30 M/uL    HGB 8.0 (L) 12.0 - 16.0 g/dL    HCT 24.0 (L) 35.0 - 45.0 %    MCV 92.3 78.0 - 100.0 FL    MCH 30.8 24.0 - 34.0 PG    MCHC 33.3 31.0 - 37.0 g/dL    RDW 14.7 (H) 11.6 - 14.5 %    PLATELET 232 (H) 419 - 420 K/uL    MPV 8.5 (L) 9.2 - 11.8 FL    NRBC 0.4 (H) 0  WBC    ABSOLUTE NRBC 0.06 (H) 0.00 - 0.01 K/uL    NEUTROPHILS 74 (H) 40 - 73 %    LYMPHOCYTES 11 (L) 21 - 52 %    MONOCYTES 7 3 - 10 %    EOSINOPHILS 1 0 - 5 %    BASOPHILS 0 0 - 2 %    IMMATURE GRANULOCYTES 7 (H) 0.0 - 0.5 %    ABS. NEUTROPHILS 11.4 (H) 1.8 - 8.0 K/UL    ABS. LYMPHOCYTES 1.7 0.9 - 3.6 K/UL    ABS. MONOCYTES 1.1 0.05 - 1.2 K/UL    ABS. EOSINOPHILS 0.2 0.0 - 0.4 K/UL    ABS. BASOPHILS 0.0 0.0 - 0.1 K/UL    ABS. IMM. GRANS.  1.1 (H) 0.00 - 0.04 K/UL    DF AUTOMATED      RBC COMMENTS NORMOCYTIC, NORMOCHROMIC     METABOLIC PANEL, BASIC    Collection Time: 11/23/21  5:27 AM   Result Value Ref Range    Sodium 143 136 - 145 mmol/L    Potassium 3.6 3.5 - 5.5 mmol/L    Chloride 110 100 - 111 mmol/L    CO2 24 21 - 32 mmol/L    Anion gap 9 3.0 - 18 mmol/L    Glucose 65 (L) 74 - 99 mg/dL    BUN 12 7.0 - 18 MG/DL    Creatinine 0.65 0.6 - 1.3 MG/DL    BUN/Creatinine ratio 18 12 - 20      GFR est AA >60 >60 ml/min/1.73m2    GFR est non-AA >60 >60 ml/min/1.73m2    Calcium 8.0 (L) 8.5 - 10.1 MG/DL           [facility] MD

## 2021-11-23 NOTE — PROGRESS NOTES
Problem: Falls - Risk of  Goal: *Absence of Falls  Description: Document Bard  Fall Risk and appropriate interventions in the flowsheet.   Outcome: Progressing Towards Goal  Note: Fall Risk Interventions:                                Problem: Infection - Risk of, Surgical Site Infection  Goal: *Absence of surgical site infection signs and symptoms  Outcome: Progressing Towards Goal

## 2021-11-23 NOTE — DISCHARGE SUMMARY
Discharge Summary    Patient: Veena Wu MRN: [id number]  CSN: [id number]    YOB: 1995  Age: 27 y.o. Sex: female    DOA: 11/21/2021 LOS:  LOS: 0 days   Discharge Date:      Primary Care Provider:  Navi Moore MD    Admission Diagnoses: Anemia [D64.9]  UTI (urinary tract infection) [N39.0]  Pulmonary edema [J81.1]    Discharge Diagnoses:    Hospital Problems  Date Reviewed: 11/22/2021          Codes Class Noted POA    Pulmonary edema ICD-10-CM: J81.1  ICD-9-CM: 227  11/23/2021 Unknown        UTI (urinary tract infection) ICD-10-CM: N39.0  ICD-9-CM: 599.0  11/22/2021 Unknown        * (Principal) Anemia ICD-10-CM: D64.9  ICD-9-CM: 285.9  11/22/2021 Unknown        Leukocytosis ICD-10-CM: G42.529  ICD-9-CM: 288.60  11/22/2021 Unknown        Hypokalemia ICD-10-CM: E87.6  ICD-9-CM: 276.8  11/22/2021 Unknown        At risk for breastfeeding difficulty ICD-10-CM: Z91.89  ICD-9-CM: V15.89  11/22/2021 Yes              Discharge Condition: stable     Discharge Medications:     Current Discharge Medication List      START taking these medications    Details   ferrous sulfate 325 mg (65 mg iron) tablet Take 1 Tablet by mouth two (2) times daily (with meals). Qty: 60 Tablet, Refills: 0  Start date: 11/23/2021      furosemide (Lasix) 20 mg tablet Take 1 Tablet by mouth daily for 5 days. Qty: 5 Tablet, Refills: 0  Start date: 11/23/2021, End date: 11/28/2021      ibuprofen (MOTRIN) 800 mg tablet Take 1 Tablet by mouth every eight (8) hours as needed for Pain. Indications: pain  Qty: 9 Tablet, Refills: 0  Start date: 11/23/2021      amoxicillin (AMOXIL) 250 mg capsule Take 1 Capsule by mouth three (3) times daily for 5 days. Qty: 15 Capsule, Refills: 0  Start date: 11/23/2021, End date: 11/28/2021      cephALEXin (Keflex) 500 mg capsule Take 1 Capsule by mouth four (4) times daily for 10 days.   Qty: 40 Capsule, Refills: 0  Start date: 11/22/2021, End date: 12/2/2021      potassium chloride (K-DUR, KLOR-CON M20) 20 mEq tablet Take 1 Tablet by mouth three (3) times daily for 3 days. Qty: 9 Tablet, Refills: 0  Start date: 2021, End date: 2021             Procedures : none     Consults: Cardiology and Gynecology      PHYSICAL EXAM   Visit Vitals  /75 (BP 1 Location: Left upper arm, BP Patient Position: At rest)   Pulse 70   Temp 98.3 °F (36.8 °C)   Resp 18   Ht 5' 4\" (1.626 m)   Wt 76.2 kg (168 lb)   SpO2 97%   BMI 28.84 kg/m²     General: Awake, cooperative, no acute distress    HEENT: NC, Atraumatic. PERRLA, EOMI. Anicteric sclerae. Lungs:  CTA Bilaterally. No Wheezing/Rhonchi/Rales. Heart:  Regular  rhythm,  No murmur, No Rubs, No Gallops  Abdomen: Soft, Non distended, Non tender. +Bowel sounds,   Extremities: No c/c/e  Psych:   Not anxious or agitated. Neurologic:  No acute neurological deficits. Admission HPI :   Nicole Boudreaux is a 27 y.o.  female who G1, P1 postpartum 1 week 9 pound baby boy post  transabdominal presents to the emergency room with increased dyspnea on exertion fevers chills and back pain. Patient was discharged with a hemoglobin of 9 and her hemoglobin in the emergency room was found to be 7 she has been passing large clots of blood she called OB who gave her reassurance. Despite that patient has had increasing fatigue chills and general malaise and came to be evaluated. Her son is now having diarrhea but otherwise she feels well she is pumping and breast-feeding. She had her Pfizer vaccine in  denies any Covid exposures  In the emergency room she has received 1 unit of blood but is still feeling weak with general malaise and dyspnea also complained of lower extremity edema did not have any preeclampsia postpartum hypertension. In the emergency room she had a brief episode of hypoxemia during blood transfusion that required oxygen she is now better.     Hospital Course :   Nicole Boudreaux is a 27 y.o.  female who G1, P1 postpartum 1 week 9 pound baby boy post  transabdominal was admitted due to sob and anemia /uti. cta chest no PE , but pleural effusion noted. Lasix was started and her sob responded to lasix very. Echo was done , cardiologist was consulted due to mild low EF. Dr. Natasha Barnes saw patient and he thinks ef was normal. She has no sob on exertion. She is anxious to go home. .   she also received rocephin for uti treatment. Will continue po abx to complete 7 days treatment . GYN was consulted and no infection indicated from gyn site. She received one unit prbc for anemia and her vaginal bleeding decreased and she will continue iron supplement at home. She is breast feeding, recommend to avoid breast feeding when she is on lasix and amoxacillin, but continue to pump milk. Discharge planning discussed with patient, pt agrees  with the plan and no questions and concerns at this point. Activity: Activity as tolerated    Diet: Regular Diet    Follow-up: PCP     Disposition: home     Minutes spent on discharge: 45 min       Labs: Results:       Chemistry Recent Labs     21  0945 21  2340   GLU 65* 80 89    142 141   K 3.6 3.7 3.0*    111 110   CO2 24 21 23   BUN 12 10 14   CREA 0.65 0.56* 0.55*   CA 8.0* 7.9* 8.0*   AGAP 9 10 8   BUCR 18 18 25*   AP  --  131*  --    TP  --  5.0*  --    ALB  --  1.9*  --    GLOB  --  3.1  --    AGRAT  --  0.6*  --       CBC w/Diff Recent Labs     21  0945 21  2340   WBC 15.5* 17.0* 16.8*   RBC 2.60* 2.66* 2.29*   HGB 8.0* 8.2* 7.0*   HCT 24.0* 24.8* 21.4*   * 369 486*   GRANS 74* 81* 84*   LYMPH 11* 13* 9*   EOS 1 0 0      Cardiac Enzymes Recent Labs     21  2340   CPK 38      Coagulation No results for input(s): PTP, INR, APTT, INREXT in the last 72 hours.     Lipid Panel No results found for: CHOL, CHOLPOCT, CHOLX, CHLST, CHOLV, 281081, HDL, HDLP, LDL, LDLC, DLDLP, 977776, VLDLC, VLDL, TGLX, TRIGL, TRIGP, TGLPOCT, CHHD, CHHDX   BNP No results for input(s): BNPP in the last 72 hours. Liver Enzymes Recent Labs     21  0945   TP 5.0*   ALB 1.9*   *      Thyroid Studies No results found for: T4, T3U, TSH, TSHEXT           Significant Diagnostic Studies: CTA CHEST ABD PELV W WO CONT    Result Date: 2021  HISTORY: -From Provider: postpartum fever/ dyspnea/ c section a -Additional: One-week postop . Chills. Fever. Passing blood clots vaginally this exam. Technique: Axial imaging was obtained dynamically through the pre-and post contrast aorta using high-resolution CT angiographic protocol, without complications. In addition, coronal and sagittal reconstructed MIP arteriograms were performed, to better evaluate the aorta, and to increase sensitivity for detection of aortic dissection and aneurysm. Dose reduction techniques used: automated exposure control, adjustment of the mAs and/or kVp according to patient size, and iterative reconstruction techniques. Comparison study: . Findings: Cardiovascular: Aorta: Normal in caliber, without evidence of aneurysm or dissection]. Pulmonary arteries: opacified completely, without evidence of filling defects to suggest pulmonary thromboembolism. Please note, the study was performed with optimal contrast bolus timing to the aorta is not optimal for assessment of pulmonary emboli. Coronary arteries: Unremarkable. Heart: The heart is not enlarged. There is a small pericardial effusion. . ] Abdominal visceral arteries: Celiac, SMA, bilateral renal arteries, VALERIA without significant origin disease/calcification. Iliac arteries: unremarkable. CT CHEST: Pulmonary parenchyma: There is septal interstitial thickening. There is mild respiratory motion degradation limiting evaluation of the lung parenchyma. Mild right and minimal left dependent atelectasis/consolidation. Lymph nodes: There is no significant axillary, hilar, or mediastinal lymphadenopathy. Pleura: Moderate right and small left pleural effusions. Chest wall/lower neck soft tissues: CT ABDOMEN: Liver : Unremarkable, without focal lesions. Vascular enhancement: Portal, splenic vein, SMV are unremarkable Spleen: Unremarkable, without focal lesions. Adrenal glands: Unremarkable, without focal lesions. Pancreas: Unremarkable, without focal lesions. Stomach/duodenum: Unremarkable. Gallbladder: No calcified gallstones or surrounding inflammatory changes identified. Biliary system: Unremarkable. Retroperitoneum: Kidneys: No hydronephrosis or renal calculi. No focal lesions. IVC: Enlarged, not opacified at phase of contrast obtained. . Lymph nodes:  No significantly enlarged lymph nodes. Intraperitoneal free air:None seen. Intraperitoneal free fluid: Small amount of hypodense fluid about the gallbladder, liver and in the cul-de-sac. No hyperdense hemorrhage. CT PELVIS: Bowel : Unremarkable. Appendix: Normal as seen. No localized right lower quadrant or pericecal inflammatory changes. Bladder: Mild bladder wall thickening 1in the underdistended state. [No bladder stones are seen.] Other:  Enlarged postpartum uterus, containing internal gas. Prominent enhancing uterine and parametrial vessels. There is stranding of the anterior abdominal wall fat. There are foci of cutaneous gas in the low pelvis superficial to the anterior abdominal wall. There is hypodense thickening/edema about the umbilicus. Osseous structures: Unremarkable ]     1. Prominent enhancing uterine and parametrial vessels, with gas in the enlarged uterus. This is nonspecific and the postpartum/post  state, and can be normal. CT cannot exclude endometritis if clinically suspected. Contains gas in the anterior abdominal wall superficial to the musculature, additionally nonspecific and can be postoperative related to phlegmon/infection. No circumscribed, drainable collections are seen.  2. Bilateral pleural effusions and septal interstitial thickening, which can be seen in setting of fluid overload/pulmonary edema. Enlarged IVC additionally which can be seen in the setting of overhydration. Bibasilar dependent atelectasis or consolidation. 3. Intraperitoneal free fluid, and fluid about the distended gallbladder, without calcified gallstones. This is nonspecific and may be related to recent postoperative state If clinical findings suggest cholecystitis, then dedicated gallbladder ultrasound could best further assess. 4. Mild bladder wall thickening, which can be seen in the setting of underdistention or superimposed infection/cystitis, best correlated with clinical and laboratory/urinalysis findings. US ABD LTD    Result Date: 11/23/2021  EXAM: Limited right upper quadrant abdominal ultrasound INDICATION: Right upper quadrant pain. COMPARISON: CT one day prior. TECHNIQUE: Real-time abdomen/right upper quadrant sonography in multiple planes was performed with image documentation. Grayscale, color flow Doppler imaging, and velocity spectral waveform analysis of the portal vein was performed (duplex imaging). _______________ FINDINGS: LIVER: Normal in echotexture. No focal mass Color flow Doppler and velocity spectral waveform analysis of the portal vein shows normal (hepatopetal) direction of flow. Rawleigh Billing BILIARY SYSTEM: No intrahepatic biliary dilatation. Common bile duct is normal in caliber measuring 0.6 cm. GALLBLADDER: No gallstones or gallbladder wall thickening. No pericholecystic fluid. Small polyp measuring 2 mm. RIGHT KIDNEY: 10.5 cm in length. No hydronephrosis or renal mass. No visible calculi. PANCREAS: Head and body are unremarkable in appearance though the tail is obscured by overlying bowel gas. IVC: Visualized portions are unremarkable in appearance. OTHER: Trace perihepatic fluid. Small right effusion. _______________     No gallstones or secondary findings of cholecystitis. Trace perihepatic fluid. Small right effusion. ECHO ADULT COMPLETE    Result Date: 11/22/2021  · LV: Estimated LVEF is 50 - 55%. Normal cavity size, wall thickness and diastolic function. Low normal systolic function. E/E'= 9.64. · MV: Moderate mitral valve regurgitation is present. · TV: Mild to moderate tricuspid valve regurgitation is present. · PV: Mild pulmonic valve regurgitation is present. · PA: Pulmonary arterial systolic pressure is 34 mmHg. DUPLEX LOWER EXT VENOUS BILAT    Result Date: 11/22/2021  · No evidence of deep vein thrombosis in the right lower extremity. · No evidence of deep vein thrombosis in the left lower extremity.               [facility]     CC: Arthea Boeck, MD

## 2021-11-23 NOTE — PROGRESS NOTES
0730 Bedside and verbal shift change report given to Татьяна Weeks RN (on coming nurse) by Demetra Ma RN (off going nurse). Report included the following information SBAR, Kardex, OR Summary, Intake/Output and MAR.    0900 one 4 oz, and one 2.5 oz bottle sent to mother baby. Extra bottle of breast milk return to pt    1736  AVS review with pt, opportunity for questions and concerns at this time. D/c IV clean and dry. Properly discarded armbands.

## 2021-11-23 NOTE — PROGRESS NOTES
Gynecology Consult    Name: Joe Claire MRN: [id number] SSN: [id number]    YOB: 1995  Age: 27 y.o. Sex: female       Subjective:      Chief complaint:  Fever/chills    Rajendra Mejía is a 27 y.o.  female with a history of  section 9 days ago at [facility]. She is currently breastfeeding and has been pumping and feeding EBM. Patient reports feeling \"better\" this morning; is passing flatus and is tolerating a regular diet. No past medical history on file. Past Surgical History:   Procedure Laterality Date    HX  SECTION       OB History    No obstetric history on file.        No Known Allergies  Current Facility-Administered Medications   Medication Dose Route Frequency Provider Last Rate Last Admin    0.9% sodium chloride infusion 250 mL  250 mL IntraVENous PRN Juan Manuel Howard MD        sodium chloride (NS) flush 5-40 mL  5-40 mL IntraVENous Q8H Aaron Loaiza MD   10 mL at 21 0745    sodium chloride (NS) flush 5-40 mL  5-40 mL IntraVENous PRN Aaron Loaiza MD        acetaminophen (TYLENOL) tablet 650 mg  650 mg Oral Q6H PRN Aaron Loaiza MD   650 mg at 21 1439    Or    acetaminophen (TYLENOL) suppository 650 mg  650 mg Rectal Q6H PRN Aaron Loaiza MD        polyethylene glycol (MIRALAX) packet 17 g  17 g Oral DAILY PRN Aaron Loaiza MD   17 g at 21 1733    ondansetron (ZOFRAN ODT) tablet 4 mg  4 mg Oral Q8H PRN Aaron Loaiza MD        Or    ondansetron (ZOFRAN) injection 4 mg  4 mg IntraVENous Q6H PRN Aaron Loaiza MD        cefTRIAXone (ROCEPHIN) 1 g in sterile water (preservative free) 10 mL IV syringe  1 g IntraVENous Q24H Aaron Loaiza MD   1 g at 21 0745    ibuprofen (MOTRIN) tablet 800 mg  800 mg Oral TID Akash Doherty CNM   800 mg at 21     ferrous sulfate tablet 325 mg  1 Tablet Oral BID WITH MEALS Akash Doherty CNM   325 mg at 21 3832    furosemide (LASIX) injection 20 mg  20 mg IntraVENous BID Juliana Stallings MD   20 mg at 11/23/21 2764       No family history on file. Social History     Socioeconomic History    Marital status:      Spouse name: Not on file    Number of children: Not on file    Years of education: Not on file    Highest education level: Not on file   Occupational History    Not on file   Tobacco Use    Smoking status: Not on file    Smokeless tobacco: Not on file   Substance and Sexual Activity    Alcohol use: Not on file    Drug use: Not on file    Sexual activity: Not on file   Other Topics Concern    Not on file   Social History Narrative    Not on file     Social Determinants of Health     Financial Resource Strain:     Difficulty of Paying Living Expenses: Not on file   Food Insecurity:     Worried About Running Out of Food in the Last Year: Not on file    Mendez of Food in the Last Year: Not on file   Transportation Needs:     Lack of Transportation (Medical): Not on file    Lack of Transportation (Non-Medical):  Not on file   Physical Activity:     Days of Exercise per Week: Not on file    Minutes of Exercise per Session: Not on file   Stress:     Feeling of Stress : Not on file   Social Connections:     Frequency of Communication with Friends and Family: Not on file    Frequency of Social Gatherings with Friends and Family: Not on file    Attends Confucianist Services: Not on file    Active Member of [address] [place] [address] or Organizations: Not on file    Attends Club or Organization Meetings: Not on file    Marital Status: Not on file   Intimate Partner Violence:     Fear of Current or Ex-Partner: Not on file    Emotionally Abused: Not on file    Physically Abused: Not on file    Sexually Abused: Not on file   Housing Stability:     Unable to Pay for Housing in the Last Year: Not on file    Number of [place] in the Last Year: Not on file    Unstable Housing in the Last Year: Not on file Review of Systems:  Constitutional: negative     Objective:     Vitals:    21 1927 21 2335 21 0258 21 0732   BP: 122/82 115/64 110/75 129/78   Pulse: 64 61 78 72   Resp: 18 18 18 18   Temp: 98.6 °F (37 °C) 98.1 °F (36.7 °C) 98.2 °F (36.8 °C) 98 °F (36.7 °C)   SpO2: 99% 96% 93% 98%   Weight:       Height:           General:  alert, cooperative, no distress, appears stated age   Skin:  no rash or abnormalities   Eyes: negative           Lungs:  clear to auscultation bilaterally       Abdomen: soft, non-tender.  Bowel sounds normal. No masses,  no organomegaly,  section clean, dry and intact       Genitourinary: exam deferred   Extremities:  extremities normal, atraumatic, no cyanosis or edema   Neurologic:  negative         Assessment:     Anemia and UTI 9 days postpartum s/p  sectiom    Plan:       Encouraged continued frequent breast pumping  Continue current POC outlined by medical  Will continue to follow for Ob as needed

## 2021-11-25 ENCOUNTER — HOSPITAL ENCOUNTER (INPATIENT)
Age: 26
LOS: 4 days | Discharge: HOME | DRG: 776 | End: 2021-11-29
Attending: EMERGENCY MEDICINE | Admitting: OBSTETRICS & GYNECOLOGY
Payer: COMMERCIAL

## 2021-11-25 ENCOUNTER — APPOINTMENT (OUTPATIENT)
Dept: GENERAL RADIOLOGY | Age: 26
DRG: 776 | End: 2021-11-25
Attending: INTERNAL MEDICINE
Payer: COMMERCIAL

## 2021-11-25 DIAGNOSIS — R65.21 SEPSIS WITH ACUTE ORGAN DYSFUNCTION AND SEPTIC SHOCK, DUE TO UNSPECIFIED ORGANISM, UNSPECIFIED TYPE (HCC): ICD-10-CM

## 2021-11-25 DIAGNOSIS — A41.9 SEPSIS WITH ACUTE ORGAN DYSFUNCTION AND SEPTIC SHOCK, DUE TO UNSPECIFIED ORGANISM, UNSPECIFIED TYPE (HCC): ICD-10-CM

## 2021-11-25 PROBLEM — R57.9 SHOCK (HCC): Status: ACTIVE | Noted: 2021-11-25

## 2021-11-25 PROBLEM — R50.9 FEVER: Status: ACTIVE | Noted: 2021-11-25

## 2021-11-25 PROBLEM — T81.9XXA POSTOPERATIVE COMPLICATION: Status: ACTIVE | Noted: 2021-11-25

## 2021-11-25 PROBLEM — J90 PLEURAL EFFUSION, BILATERAL: Status: ACTIVE | Noted: 2021-11-25

## 2021-11-25 LAB
ALBUMIN SERPL-MCNC: 1.8 G/DL (ref 3.4–5)
ALBUMIN/GLOB SERPL: 0.5 {RATIO} (ref 0.8–1.7)
ALP SERPL-CCNC: 110 U/L (ref 45–117)
ALT SERPL-CCNC: 17 U/L (ref 13–56)
ANION GAP SERPL CALC-SCNC: 8 MMOL/L (ref 3–18)
APPEARANCE UR: ABNORMAL
AST SERPL-CCNC: 11 U/L (ref 10–38)
BACTERIA URNS QL MICRO: ABNORMAL /HPF
BASOPHILS # BLD: 0 K/UL (ref 0–0.1)
BASOPHILS # BLD: 0 K/UL (ref 0–0.1)
BASOPHILS NFR BLD: 0 % (ref 0–2)
BASOPHILS NFR BLD: 0 % (ref 0–2)
BILIRUB SERPL-MCNC: 0.3 MG/DL (ref 0.2–1)
BILIRUB UR QL: NEGATIVE
BUN SERPL-MCNC: 12 MG/DL (ref 7–18)
BUN/CREAT SERPL: 18 (ref 12–20)
CA-I SERPL-SCNC: 1.11 MMOL/L (ref 1.12–1.32)
CALCIUM SERPL-MCNC: 8 MG/DL (ref 8.5–10.1)
CHLORIDE SERPL-SCNC: 110 MMOL/L (ref 100–111)
CO2 SERPL-SCNC: 24 MMOL/L (ref 21–32)
COLOR UR: ABNORMAL
COVID-19 RAPID TEST, COVR: NOT DETECTED
CREAT SERPL-MCNC: 0.67 MG/DL (ref 0.6–1.3)
DIFFERENTIAL METHOD BLD: ABNORMAL
DIFFERENTIAL METHOD BLD: ABNORMAL
EOSINOPHIL # BLD: 0 K/UL (ref 0–0.4)
EOSINOPHIL # BLD: 0.1 K/UL (ref 0–0.4)
EOSINOPHIL NFR BLD: 0 % (ref 0–5)
EOSINOPHIL NFR BLD: 0 % (ref 0–5)
EPITH CASTS URNS QL MICRO: ABNORMAL /LPF (ref 0–5)
ERYTHROCYTE [DISTWIDTH] IN BLOOD BY AUTOMATED COUNT: 14.4 % (ref 11.6–14.5)
ERYTHROCYTE [DISTWIDTH] IN BLOOD BY AUTOMATED COUNT: 14.9 % (ref 11.6–14.5)
ERYTHROCYTE [DISTWIDTH] IN BLOOD BY AUTOMATED COUNT: 15.3 % (ref 11.6–14.5)
FDP BLD QL AGGL: >20 UG/ML
FIBRINOGEN PPP-MCNC: 533 MG/DL (ref 210–451)
GLOBULIN SER CALC-MCNC: 3.3 G/DL (ref 2–4)
GLUCOSE SERPL-MCNC: 116 MG/DL (ref 74–99)
GLUCOSE UR STRIP.AUTO-MCNC: NEGATIVE MG/DL
HCT VFR BLD AUTO: 24.9 % (ref 35–45)
HCT VFR BLD AUTO: 25.3 % (ref 35–45)
HCT VFR BLD AUTO: 26.4 % (ref 35–45)
HGB BLD-MCNC: 8 G/DL (ref 12–16)
HGB BLD-MCNC: 8.4 G/DL (ref 12–16)
HGB BLD-MCNC: 8.6 G/DL (ref 12–16)
HGB UR QL STRIP: ABNORMAL
IMM GRANULOCYTES # BLD AUTO: 0.9 K/UL (ref 0–0.04)
IMM GRANULOCYTES # BLD AUTO: 1.1 K/UL (ref 0–0.04)
IMM GRANULOCYTES NFR BLD AUTO: 4 % (ref 0–0.5)
IMM GRANULOCYTES NFR BLD AUTO: 6 % (ref 0–0.5)
INR PPP: 1.3 (ref 0.8–1.2)
INR PPP: 1.3 (ref 0.8–1.2)
KETONES UR QL STRIP.AUTO: NEGATIVE MG/DL
LACTATE BLD-SCNC: 1.36 MMOL/L (ref 0.4–2)
LEUKOCYTE ESTERASE UR QL STRIP.AUTO: ABNORMAL
LIPASE SERPL-CCNC: 64 U/L (ref 73–393)
LYMPHOCYTES # BLD: 2.1 K/UL (ref 0.9–3.6)
LYMPHOCYTES # BLD: 2.4 K/UL (ref 0.9–3.6)
LYMPHOCYTES NFR BLD: 14 % (ref 21–52)
LYMPHOCYTES NFR BLD: 9 % (ref 21–52)
MAGNESIUM SERPL-MCNC: 1.6 MG/DL (ref 1.6–2.6)
MAGNESIUM SERPL-MCNC: 2.2 MG/DL (ref 1.6–2.6)
MCH RBC QN AUTO: 29.3 PG (ref 24–34)
MCH RBC QN AUTO: 29.8 PG (ref 24–34)
MCH RBC QN AUTO: 30.4 PG (ref 24–34)
MCHC RBC AUTO-ENTMCNC: 32.1 G/DL (ref 31–37)
MCHC RBC AUTO-ENTMCNC: 32.6 G/DL (ref 31–37)
MCHC RBC AUTO-ENTMCNC: 33.2 G/DL (ref 31–37)
MCV RBC AUTO: 89.7 FL (ref 78–100)
MCV RBC AUTO: 89.8 FL (ref 78–100)
MCV RBC AUTO: 94.7 FL (ref 78–100)
MONOCYTES # BLD: 1 K/UL (ref 0.05–1.2)
MONOCYTES # BLD: 1.4 K/UL (ref 0.05–1.2)
MONOCYTES NFR BLD: 6 % (ref 3–10)
MONOCYTES NFR BLD: 6 % (ref 3–10)
NEUTS BAND NFR BLD MANUAL: 3 %
NEUTS SEG # BLD: 13.2 K/UL (ref 1.8–8)
NEUTS SEG # BLD: 18.4 K/UL (ref 1.8–8)
NEUTS SEG NFR BLD: 74 % (ref 40–73)
NEUTS SEG NFR BLD: 78 % (ref 40–73)
NITRITE UR QL STRIP.AUTO: NEGATIVE
NRBC # BLD: 0.02 K/UL (ref 0–0.01)
NRBC # BLD: 0.03 K/UL (ref 0–0.01)
NRBC # BLD: 0.1 K/UL (ref 0–0.01)
NRBC BLD-RTO: 0.1 PER 100 WBC
NRBC BLD-RTO: 0.1 PER 100 WBC
NRBC BLD-RTO: 0.6 PER 100 WBC
PH UR STRIP: 7 [PH] (ref 5–8)
PLATELET # BLD AUTO: 310 K/UL (ref 135–420)
PLATELET # BLD AUTO: 369 K/UL (ref 135–420)
PLATELET # BLD AUTO: 473 K/UL (ref 135–420)
PMV BLD AUTO: 8.7 FL (ref 9.2–11.8)
PMV BLD AUTO: 8.7 FL (ref 9.2–11.8)
PMV BLD AUTO: 8.8 FL (ref 9.2–11.8)
POTASSIUM SERPL-SCNC: 3.4 MMOL/L (ref 3.5–5.5)
POTASSIUM SERPL-SCNC: 3.7 MMOL/L (ref 3.5–5.5)
PROT SERPL-MCNC: 5.1 G/DL (ref 6.4–8.2)
PROT UR STRIP-MCNC: NEGATIVE MG/DL
PROTHROMBIN TIME: 15.3 SEC (ref 11.5–15.2)
PROTHROMBIN TIME: 15.5 SEC (ref 11.5–15.2)
RBC # BLD AUTO: 2.63 M/UL (ref 4.2–5.3)
RBC # BLD AUTO: 2.82 M/UL (ref 4.2–5.3)
RBC # BLD AUTO: 2.94 M/UL (ref 4.2–5.3)
RBC #/AREA URNS HPF: ABNORMAL /HPF (ref 0–5)
RBC MORPH BLD: ABNORMAL
SODIUM SERPL-SCNC: 142 MMOL/L (ref 136–145)
SOURCE, COVRS: NORMAL
SP GR UR REFRACTOMETRY: 1.01 (ref 1–1.03)
UROBILINOGEN UR QL STRIP.AUTO: 0.2 EU/DL (ref 0.2–1)
WBC # BLD AUTO: 17.8 K/UL (ref 4.6–13.2)
WBC # BLD AUTO: 22.8 K/UL (ref 4.6–13.2)
WBC # BLD AUTO: 23.2 K/UL (ref 4.6–13.2)
WBC URNS QL MICRO: ABNORMAL /HPF (ref 0–5)

## 2021-11-25 PROCEDURE — 74011250636 HC RX REV CODE- 250/636: Performed by: INTERNAL MEDICINE

## 2021-11-25 PROCEDURE — 74011250637 HC RX REV CODE- 250/637: Performed by: INTERNAL MEDICINE

## 2021-11-25 PROCEDURE — 83690 ASSAY OF LIPASE: CPT

## 2021-11-25 PROCEDURE — 87635 SARS-COV-2 COVID-19 AMP PRB: CPT

## 2021-11-25 PROCEDURE — 74011250636 HC RX REV CODE- 250/636: Performed by: HOSPITALIST

## 2021-11-25 PROCEDURE — 74011000258 HC RX REV CODE- 258: Performed by: INTERNAL MEDICINE

## 2021-11-25 PROCEDURE — 65610000006 HC RM INTENSIVE CARE

## 2021-11-25 PROCEDURE — 74011000258 HC RX REV CODE- 258: Performed by: OBSTETRICS & GYNECOLOGY

## 2021-11-25 PROCEDURE — 74011000250 HC RX REV CODE- 250: Performed by: FAMILY MEDICINE

## 2021-11-25 PROCEDURE — 85384 FIBRINOGEN ACTIVITY: CPT

## 2021-11-25 PROCEDURE — 36430 TRANSFUSION BLD/BLD COMPNT: CPT

## 2021-11-25 PROCEDURE — 30233N1 TRANSFUSION OF NONAUTOLOGOUS RED BLOOD CELLS INTO PERIPHERAL VEIN, PERCUTANEOUS APPROACH: ICD-10-PCS | Performed by: OBSTETRICS & GYNECOLOGY

## 2021-11-25 PROCEDURE — 36415 COLL VENOUS BLD VENIPUNCTURE: CPT

## 2021-11-25 PROCEDURE — 85025 COMPLETE CBC W/AUTO DIFF WBC: CPT

## 2021-11-25 PROCEDURE — 85362 FIBRIN DEGRADATION PRODUCTS: CPT

## 2021-11-25 PROCEDURE — 74011250637 HC RX REV CODE- 250/637: Performed by: OBSTETRICS & GYNECOLOGY

## 2021-11-25 PROCEDURE — 74011000258 HC RX REV CODE- 258: Performed by: HOSPITALIST

## 2021-11-25 PROCEDURE — 86901 BLOOD TYPING SEROLOGIC RH(D): CPT

## 2021-11-25 PROCEDURE — 82330 ASSAY OF CALCIUM: CPT

## 2021-11-25 PROCEDURE — 83735 ASSAY OF MAGNESIUM: CPT

## 2021-11-25 PROCEDURE — 83605 ASSAY OF LACTIC ACID: CPT

## 2021-11-25 PROCEDURE — 74011250637 HC RX REV CODE- 250/637: Performed by: FAMILY MEDICINE

## 2021-11-25 PROCEDURE — 74011250636 HC RX REV CODE- 250/636: Performed by: OBSTETRICS & GYNECOLOGY

## 2021-11-25 PROCEDURE — 71045 X-RAY EXAM CHEST 1 VIEW: CPT

## 2021-11-25 PROCEDURE — 96374 THER/PROPH/DIAG INJ IV PUSH: CPT

## 2021-11-25 PROCEDURE — 85027 COMPLETE CBC AUTOMATED: CPT

## 2021-11-25 PROCEDURE — 85610 PROTHROMBIN TIME: CPT

## 2021-11-25 PROCEDURE — 87040 BLOOD CULTURE FOR BACTERIA: CPT

## 2021-11-25 PROCEDURE — P9016 RBC LEUKOCYTES REDUCED: HCPCS

## 2021-11-25 PROCEDURE — 74011250636 HC RX REV CODE- 250/636: Performed by: FAMILY MEDICINE

## 2021-11-25 PROCEDURE — 86920 COMPATIBILITY TEST SPIN: CPT

## 2021-11-25 PROCEDURE — 99285 EMERGENCY DEPT VISIT HI MDM: CPT

## 2021-11-25 PROCEDURE — 87070 CULTURE OTHR SPECIMN AEROBIC: CPT

## 2021-11-25 PROCEDURE — 74011000250 HC RX REV CODE- 250: Performed by: INTERNAL MEDICINE

## 2021-11-25 PROCEDURE — 81001 URINALYSIS AUTO W/SCOPE: CPT

## 2021-11-25 PROCEDURE — 80053 COMPREHEN METABOLIC PANEL: CPT

## 2021-11-25 PROCEDURE — 74011250636 HC RX REV CODE- 250/636: Performed by: EMERGENCY MEDICINE

## 2021-11-25 PROCEDURE — 87086 URINE CULTURE/COLONY COUNT: CPT

## 2021-11-25 RX ORDER — ACETAMINOPHEN 325 MG/1
650 TABLET ORAL ONCE
Status: COMPLETED | OUTPATIENT
Start: 2021-11-25 | End: 2021-11-25

## 2021-11-25 RX ORDER — MAGNESIUM SULFATE HEPTAHYDRATE 40 MG/ML
2 INJECTION, SOLUTION INTRAVENOUS ONCE
Status: DISCONTINUED | OUTPATIENT
Start: 2021-11-25 | End: 2021-11-25 | Stop reason: RX

## 2021-11-25 RX ORDER — SODIUM CHLORIDE 0.9 % (FLUSH) 0.9 %
5-40 SYRINGE (ML) INJECTION EVERY 8 HOURS
Status: DISCONTINUED | OUTPATIENT
Start: 2021-11-25 | End: 2021-11-29 | Stop reason: HOSPADM

## 2021-11-25 RX ORDER — POTASSIUM CHLORIDE 20 MEQ/1
20 TABLET, EXTENDED RELEASE ORAL ONCE
Status: COMPLETED | OUTPATIENT
Start: 2021-11-25 | End: 2021-11-25

## 2021-11-25 RX ORDER — SODIUM CHLORIDE 0.9 % (FLUSH) 0.9 %
5-40 SYRINGE (ML) INJECTION AS NEEDED
Status: DISCONTINUED | OUTPATIENT
Start: 2021-11-25 | End: 2021-11-29 | Stop reason: HOSPADM

## 2021-11-25 RX ORDER — ONDANSETRON 4 MG/1
4 TABLET, ORALLY DISINTEGRATING ORAL
Status: DISCONTINUED | OUTPATIENT
Start: 2021-11-25 | End: 2021-11-25

## 2021-11-25 RX ORDER — SODIUM CHLORIDE 9 MG/ML
250 INJECTION, SOLUTION INTRAVENOUS AS NEEDED
Status: DISCONTINUED | OUTPATIENT
Start: 2021-11-25 | End: 2021-11-29 | Stop reason: HOSPADM

## 2021-11-25 RX ORDER — ONDANSETRON 2 MG/ML
4 INJECTION INTRAMUSCULAR; INTRAVENOUS
Status: DISCONTINUED | OUTPATIENT
Start: 2021-11-25 | End: 2021-11-25

## 2021-11-25 RX ORDER — POLYETHYLENE GLYCOL 3350 17 G/17G
17 POWDER, FOR SOLUTION ORAL DAILY PRN
Status: DISCONTINUED | OUTPATIENT
Start: 2021-11-25 | End: 2021-11-29 | Stop reason: HOSPADM

## 2021-11-25 RX ORDER — POLYETHYLENE GLYCOL 3350 17 G/17G
17 POWDER, FOR SOLUTION ORAL DAILY PRN
Status: DISCONTINUED | OUTPATIENT
Start: 2021-11-25 | End: 2021-11-26 | Stop reason: SDUPTHER

## 2021-11-25 RX ORDER — METHYLERGONOVINE MALEATE 0.2 MG/1
200 TABLET ORAL EVERY 6 HOURS
Status: DISPENSED | OUTPATIENT
Start: 2021-11-25 | End: 2021-11-27

## 2021-11-25 RX ORDER — SODIUM CHLORIDE 0.9 % (FLUSH) 0.9 %
5-40 SYRINGE (ML) INJECTION EVERY 8 HOURS
Status: DISCONTINUED | OUTPATIENT
Start: 2021-11-25 | End: 2021-11-26 | Stop reason: SDUPTHER

## 2021-11-25 RX ORDER — ACETAMINOPHEN 650 MG/1
650 SUPPOSITORY RECTAL
Status: DISCONTINUED | OUTPATIENT
Start: 2021-11-25 | End: 2021-11-25

## 2021-11-25 RX ORDER — ACETAMINOPHEN 650 MG/1
SUPPOSITORY RECTAL
Status: DISPENSED
Start: 2021-11-25 | End: 2021-11-25

## 2021-11-25 RX ORDER — POTASSIUM CHLORIDE 7.45 MG/ML
10 INJECTION INTRAVENOUS
Status: COMPLETED | OUTPATIENT
Start: 2021-11-25 | End: 2021-11-25

## 2021-11-25 RX ORDER — ONDANSETRON 4 MG/1
4 TABLET, ORALLY DISINTEGRATING ORAL
Status: DISCONTINUED | OUTPATIENT
Start: 2021-11-25 | End: 2021-11-29 | Stop reason: HOSPADM

## 2021-11-25 RX ORDER — ONDANSETRON 2 MG/ML
4 INJECTION INTRAMUSCULAR; INTRAVENOUS
Status: DISCONTINUED | OUTPATIENT
Start: 2021-11-25 | End: 2021-11-29 | Stop reason: HOSPADM

## 2021-11-25 RX ORDER — CALCIUM GLUCONATE 20 MG/ML
2 INJECTION, SOLUTION INTRAVENOUS ONCE
Status: COMPLETED | OUTPATIENT
Start: 2021-11-25 | End: 2021-11-26

## 2021-11-25 RX ORDER — ACETAMINOPHEN 325 MG/1
650 TABLET ORAL
Status: DISCONTINUED | OUTPATIENT
Start: 2021-11-25 | End: 2021-11-26

## 2021-11-25 RX ORDER — SODIUM CHLORIDE 0.9 % (FLUSH) 0.9 %
5-40 SYRINGE (ML) INJECTION AS NEEDED
Status: DISCONTINUED | OUTPATIENT
Start: 2021-11-25 | End: 2021-11-26 | Stop reason: SDUPTHER

## 2021-11-25 RX ORDER — FENTANYL CITRATE 50 UG/ML
50 INJECTION, SOLUTION INTRAMUSCULAR; INTRAVENOUS ONCE
Status: COMPLETED | OUTPATIENT
Start: 2021-11-25 | End: 2021-11-25

## 2021-11-25 RX ADMIN — Medication 10 ML: at 13:58

## 2021-11-25 RX ADMIN — METHYLERGONOVINE 200 MCG: 0.2 TABLET ORAL at 15:14

## 2021-11-25 RX ADMIN — MAGNESIUM SULFATE HEPTAHYDRATE: 500 INJECTION, SOLUTION INTRAMUSCULAR; INTRAVENOUS at 15:14

## 2021-11-25 RX ADMIN — ACETAMINOPHEN 975 MG: 650 SUPPOSITORY RECTAL at 06:05

## 2021-11-25 RX ADMIN — PIPERACILLIN AND TAZOBACTAM 4.5 G: 4; .5 INJECTION, POWDER, LYOPHILIZED, FOR SOLUTION INTRAVENOUS; PARENTERAL at 06:43

## 2021-11-25 RX ADMIN — FENTANYL CITRATE 50 MCG: 50 INJECTION, SOLUTION INTRAMUSCULAR; INTRAVENOUS at 06:27

## 2021-11-25 RX ADMIN — FAMOTIDINE 20 MG: 10 INJECTION, SOLUTION INTRAVENOUS at 09:37

## 2021-11-25 RX ADMIN — PIPERACILLIN AND TAZOBACTAM 3.38 G: 3; .375 INJECTION, POWDER, LYOPHILIZED, FOR SOLUTION INTRAVENOUS at 14:22

## 2021-11-25 RX ADMIN — Medication 10 ML: at 22:00

## 2021-11-25 RX ADMIN — POTASSIUM CHLORIDE 20 MEQ: 20 TABLET, EXTENDED RELEASE ORAL at 23:55

## 2021-11-25 RX ADMIN — POTASSIUM CHLORIDE 10 MEQ: 10 INJECTION, SOLUTION INTRAVENOUS at 09:35

## 2021-11-25 RX ADMIN — POTASSIUM CHLORIDE 10 MEQ: 10 INJECTION, SOLUTION INTRAVENOUS at 11:31

## 2021-11-25 RX ADMIN — POTASSIUM CHLORIDE 10 MEQ: 10 INJECTION, SOLUTION INTRAVENOUS at 10:07

## 2021-11-25 RX ADMIN — ACETAMINOPHEN 650 MG: 325 TABLET ORAL at 10:07

## 2021-11-25 RX ADMIN — SODIUM CHLORIDE 1000 ML: 9 INJECTION, SOLUTION INTRAVENOUS at 06:43

## 2021-11-25 RX ADMIN — POTASSIUM CHLORIDE 10 MEQ: 10 INJECTION, SOLUTION INTRAVENOUS at 13:00

## 2021-11-25 RX ADMIN — METHYLERGONOVINE 200 MCG: 0.2 TABLET ORAL at 06:19

## 2021-11-25 RX ADMIN — ACETAMINOPHEN 650 MG: 325 TABLET ORAL at 13:56

## 2021-11-25 RX ADMIN — CEFTRIAXONE 1 G: 1 INJECTION, POWDER, FOR SOLUTION INTRAMUSCULAR; INTRAVENOUS at 09:37

## 2021-11-25 RX ADMIN — METHYLERGONOVINE 200 MCG: 0.2 TABLET ORAL at 21:30

## 2021-11-25 RX ADMIN — ACETAMINOPHEN 650 MG: 325 TABLET ORAL at 21:25

## 2021-11-25 RX ADMIN — FAMOTIDINE 20 MG: 10 INJECTION, SOLUTION INTRAVENOUS at 21:26

## 2021-11-25 RX ADMIN — PIPERACILLIN AND TAZOBACTAM 3.38 G: 3; .375 INJECTION, POWDER, LYOPHILIZED, FOR SOLUTION INTRAVENOUS at 21:26

## 2021-11-25 RX ADMIN — CALCIUM GLUCONATE 2 G: 20 INJECTION, SOLUTION INTRAVENOUS at 23:55

## 2021-11-25 NOTE — ED PROVIDER NOTES
78-year-old female presents to the emergency department via EMS in acute distress 10 days postpartum having acute hemorrhage. Patient's delivery was at [facility], [place] via , she was seen here on the  and given a blood transfusion for anemia. Presents today via EMS after acute large hemorrhage tachycardic and hypotensive on arrival.  Paramedics started 1 IV prior to arrival, second IV was placed when she came to the emergency department. She was immediately given rectal Cytotec, IV fluids, TXA, and 2 units of RBCs were emergently released. History reviewed. No pertinent past medical history. Past Surgical History:   Procedure Laterality Date    HX  SECTION      HX  SECTION           History reviewed. No pertinent family history. Social History     Socioeconomic History    Marital status:      Spouse name: Not on file    Number of children: Not on file    Years of education: Not on file    Highest education level: Not on file   Occupational History    Not on file   Tobacco Use    Smoking status: Never Smoker    Smokeless tobacco: Not on file   Substance and Sexual Activity    Alcohol use: Not Currently    Drug use: Never    Sexual activity: Not on file   Other Topics Concern    Not on file   Social History Narrative    Not on file     Social Determinants of Health     Financial Resource Strain:     Difficulty of Paying Living Expenses: Not on file   Food Insecurity:     Worried About Running Out of Food in the Last Year: Not on file    Mendez of Food in the Last Year: Not on file   Transportation Needs:     Lack of Transportation (Medical): Not on file    Lack of Transportation (Non-Medical):  Not on file   Physical Activity:     Days of Exercise per Week: Not on file    Minutes of Exercise per Session: Not on file   Stress:     Feeling of Stress : Not on file   Social Connections:     Frequency of Communication with Friends and Family: Not on file  Frequency of Social Gatherings with Friends and Family: Not on file    Attends Protestant Services: Not on file    Active Member of Clubs or Organizations: Not on file    Attends Club or Organization Meetings: Not on file    Marital Status: Not on file   Intimate Partner Violence:     Fear of Current or Ex-Partner: Not on file    Emotionally Abused: Not on file    Physically Abused: Not on file    Sexually Abused: Not on file   Housing Stability:     Unable to Pay for Housing in the Last Year: Not on file    Number of [place] in the Last Year: Not on file    Unstable Housing in the Last Year: Not on file         ALLERGIES: Patient has no known allergies. Review of Systems   Constitutional: Positive for chills, diaphoresis and fatigue. Negative for activity change, appetite change, fever and unexpected weight change. HENT: Negative. Eyes: Negative. Respiratory: Positive for shortness of breath. Negative for apnea, cough, choking, chest tightness and stridor. Cardiovascular: Negative. Gastrointestinal: Positive for abdominal pain and nausea. Negative for abdominal distention, anal bleeding, blood in stool, constipation, diarrhea, rectal pain and vomiting. Genitourinary: Positive for pelvic pain, vaginal bleeding and vaginal pain. Negative for difficulty urinating, dyspareunia, dysuria, enuresis, flank pain, frequency, genital sores, hematuria and menstrual problem. Musculoskeletal: Positive for back pain. Negative for arthralgias, gait problem, joint swelling and myalgias. Skin: Positive for pallor. Negative for color change, rash and wound. Allergic/Immunologic: Negative. Neurological: Positive for dizziness and light-headedness. Negative for tremors, seizures, syncope, facial asymmetry, speech difficulty, weakness, numbness and headaches. Hematological: Negative. Psychiatric/Behavioral: Negative.         Vitals:    11/25/21 0520   BP: 102/65   Pulse: (!) 135   Resp: 20   Temp: 100.1 °F (37.8 °C)   SpO2: 94%   Weight: 74.8 kg (165 lb)   Height: 5' 4\" (1.626 m)            Physical Exam  Vitals and nursing note reviewed. Constitutional:       General: She is in acute distress. Appearance: She is ill-appearing and toxic-appearing. HENT:      Head: Normocephalic and atraumatic. Mouth/Throat:      Mouth: Mucous membranes are dry. Pharynx: Oropharynx is clear. Eyes:      Extraocular Movements: Extraocular movements intact. Pupils: Pupils are equal, round, and reactive to light. Cardiovascular:      Rate and Rhythm: Tachycardia present. Pulses: Normal pulses. Heart sounds: Normal heart sounds. No murmur heard. No friction rub. No gallop. Pulmonary:      Effort: Pulmonary effort is normal.      Breath sounds: No rhonchi or rales. Chest:      Chest wall: No tenderness. Abdominal:      General: Abdomen is flat. There is distension. Palpations: There is no mass. Tenderness: There is abdominal tenderness. There is no guarding. Genitourinary:     Comments: Massive vaginal bleeding  Musculoskeletal:      Cervical back: Normal range of motion. No rigidity or tenderness. Lymphadenopathy:      Cervical: No cervical adenopathy. Skin:     General: Skin is warm. Capillary Refill: Capillary refill takes less than 2 seconds. Coloration: Skin is pale. Neurological:      General: No focal deficit present. Mental Status: She is oriented to person, place, and time. MDM  Number of Diagnoses or Management Options  Diagnosis management comments: 64-year-old female presents emergency department via EMS in acute distress from a massive postpartum hemorrhage. Patient's delivery was at   [facility] complicated by acute postpartum anemia she was given blood transfusions on 1123. Arrived to the ED today after having gushing of blood at home she was hypotensive and tachycardic.   She was immediately brought into resuscitation room a second IV was started she was given IV fluids, rectal Cytotec, Pitocin, emergency release blood was ordered and she was given 2 units, TXA, and labs were sent. OB/GYN was immediately consulted with Dr. Dago Jesus who asked us to mobilize the OR which we did and she was immediately on the way to ER to see the patient patient not well to resuscitation pulse rate dropped hypotension began to resolve. Dr. Dago Jesus at bedside evaluated patient active bleeding has dissipated and based on her bedside ultrasound does not need to take patient to the operating room immediately. Is still being actively resuscitated for hemorrhage and now suspected septic shock. Patient be admitted to the ICU under Dr. Dago Jesus with medicine consulting. Critical Care  Performed by: Ruth Montgomery MD  Authorized by: Ruth Montgomery MD     Critical care provider statement:     Critical care time (minutes):  45    Critical care was necessary to treat or prevent imminent or life-threatening deterioration of the following conditions:  Shock    Critical care was time spent personally by me on the following activities:  Development of treatment plan with patient or surrogate, discussions with consultants, evaluation of patient's response to treatment, examination of patient, obtaining history from patient or surrogate, ordering and performing treatments and interventions, ordering and review of radiographic studies, pulse oximetry, review of old charts and re-evaluation of patient's condition      I have discussed with the patient the rationale for blood component transfusion; its benefits in treating or preventing fatigue, organ damage, or death; and its risk which includes mild transfusion reactions, rare risk of blood borne infection, or more serious but rare reactions. I have discussed the alternatives to transfusion, including the risk and consequences of not receiving transfusion.  The patient had an opportunity to ask questions and had agreed to proceed with transfusion of blood components.

## 2021-11-25 NOTE — ED NOTES
Pt received 600mcg cytotec pr, 0526 20units PIT in 1L NS, 1L NS, TXA 1g. Given by Carson Gutierrez RN

## 2021-11-25 NOTE — ED NOTES
Upon arrival to the ED the pt had a temperature of 10.1, pt was tremulous being given emergent blood at an rapid rate, pt was miserable and hemorrhaging. OB doctor was at the bedside when bear hugger was placed on the patient. Fearful that the pt would decompensate if she were allowed to continue to be tremulous. After pt's temperature increased to 102 the warmer was turned down to the lowest setting. Pt tolerated all interventions very well. Pt stable for transfer, report called to [facility] in ICU.

## 2021-11-25 NOTE — H&P
Gynecology History and Physical    Name: Surjit Archibald MRN: [id number] SSN: [id number]    YOB: 1995  Age: 27 y.o. Sex: female       Subjective:      Chief complaint:  Vaginal bleeding    Odilon Richards is a 27 y.o. female PPD #11 s/p 1 LTCS for failure to progress. Pt was readmitted  at [facility] to Hospitalist service for pulmonary edema, anemia, UTI and received transfusion, lasix, abx. She was dc'd home on oral abx. This AM when she got up to feed her baby she had a large gush of blood vaginally and continued heavy bleeding. She came to ER via EMS. On arrival she was tachycardic, hypotensive with hypoxia. Since arrival she received IV pitocin and cytotec and bleeding has reduced. She is currently receiving her second unit of PRBC's. She reports no fever or chills at home. No nausea or vomiting. She has had fatigue. No CP or SOB. History reviewed. No pertinent past medical history. Past Surgical History:   Procedure Laterality Date    HX  SECTION      HX  SECTION       OB History    No obstetric history on file. No Known Allergies  Prior to Admission medications    Medication Sig Start Date End Date Taking? Authorizing Provider   ferrous sulfate 325 mg (65 mg iron) tablet Take 1 Tablet by mouth two (2) times daily (with meals). 21   Ru Kay MD   furosemide (Lasix) 20 mg tablet Take 1 Tablet by mouth daily for 5 days. 21  Ru Kay MD   ibuprofen (MOTRIN) 800 mg tablet Take 1 Tablet by mouth every eight (8) hours as needed for Pain. Indications: pain 21   Ru Kay MD   amoxicillin (AMOXIL) 250 mg capsule Take 1 Capsule by mouth three (3) times daily for 5 days. 21  Ru Kay MD   cephALEXin (Keflex) 500 mg capsule Take 1 Capsule by mouth four (4) times daily for 10 days. 21  Odilon CALVIN MD   potassium chloride (K-DUR, KLOR-CON M20) 20 mEq tablet Take 1 Tablet by mouth three (3) times daily for 3 days.  21 11/25/21  Molina Mcneil MD      History reviewed. No pertinent family history. Social History     Socioeconomic History    Marital status:      Spouse name: Not on file    Number of children: Not on file    Years of education: Not on file    Highest education level: Not on file   Occupational History    Not on file   Tobacco Use    Smoking status: Never Smoker    Smokeless tobacco: Not on file   Substance and Sexual Activity    Alcohol use: Not Currently    Drug use: Never    Sexual activity: Not on file   Other Topics Concern    Not on file   Social History Narrative    Not on file     Social Determinants of Health     Financial Resource Strain:     Difficulty of Paying Living Expenses: Not on file   Food Insecurity:     Worried About Running Out of Food in the Last Year: Not on file    Mendez of Food in the Last Year: Not on file   Transportation Needs:     Lack of Transportation (Medical): Not on file    Lack of Transportation (Non-Medical):  Not on file   Physical Activity:     Days of Exercise per Week: Not on file    Minutes of Exercise per Session: Not on file   Stress:     Feeling of Stress : Not on file   Social Connections:     Frequency of Communication with Friends and Family: Not on file    Frequency of Social Gatherings with Friends and Family: Not on file    Attends Congregation Services: Not on file    Active Member of [address] [place] [address] [employer] or Organizations: Not on file    Attends Club or Organization Meetings: Not on file    Marital Status: Not on file   Intimate Partner Violence:     Fear of Current or Ex-Partner: Not on file    Emotionally Abused: Not on file    Physically Abused: Not on file    Sexually Abused: Not on file   Housing Stability:     Unable to Pay for Housing in the Last Year: Not on file    Number of [place] in the Last Year: Not on file    Unstable Housing in the Last Year: Not on file       Review of Systems:  See HPI    Objective:     Vitals: 21 0520 21 0555 21 0610   BP: 102/65 109/60 118/74   Pulse: (!) 135 91 93   Resp: 20  18   Temp: 100.1 °F (37.8 °C) (!) 102.3 °F (39.1 °C) (!) 102 °F (38.9 °C)   SpO2: 94% 100% 100%   Weight: 74.8 kg (165 lb)     Height: 5' 4\" (1.626 m)         Physical Exam   Gen: WDWN, mild distress, pale  CV: tachycardia, regular rhythm  Pulm: CTA bilateral  Abd: soft, appropriately ttp over  section incision which is c/d/i with no reythema or induration  : mild active bleeding, clot removed from os, uterus is firm to palpation and appropriately ttp  Bedside abdominal ultrasound shows no clear evidence of retained POC. Assessment/Plan:     1. Delayed Post Partum Hemorrhage: etiology is unclear. Pt has received uterotonics with good effect. Continue PO Methergine x 48 hours. Formal TVUS to assess for retained POC. May need D&C.    2. Anemia - symptomatic anemia s/p PPH. Resuscitative efforts continue. Complete 2nd unit PRBC and recheck h/h. No clear evidence of DIC. 3. R/O Sepsis - elevated WBC, febrile. Blood cx, UCX pending. Start Zosyn.          Blake Caputo M.D.

## 2021-11-25 NOTE — PROGRESS NOTES
0800 Patient arrived to ICU, transferred to our bed and monitors, patient alert and oriented, denies pain, reports that vaginal bleeding has significantly decreased. Patient received 2 units PRBC in the ED, will order labs to recheck hgb. Patient's potassium were low, will ask intensivist about initiating electrolyte replacement protocol. Patient bathed with CHG, has abdominal incision from C section on 11/14.  0900 Dr Marilu Sanchez ordered a rapid covid test, swabbed and sent to lab. She also ordered 2 units FFP. Potassium ordered IV and will give 4 runs of 10 mEq. Patient requesting breast pump, supervisor informed and will bring one down. 1000 Patient temp elevated and given PRN tylenol. Will hold FFP for now since patient has a fever. 1130 Patient assisted out of bed to bedside commode, had loose bowel movement and voided without difficulty, minimal bleeding, pad changed  1200 Reassessment, no changes  1300 Patient has fever again and it is too soon for another PRN dose of tylenol, intensivist does not want to give motrin, will order one time additional dose of tylenol. Dr Marilu Sanchez informed that FFP has not been given yet, she is okay to keep holding while patient has fever, monitor temp closely, wants to send clean urine for UA and culture. Continue antibiotics. Called pharmacy about [address], was due at 15, it is a hazardous med and will need to be sent down from pharmacy. 1400 Urine sent to lab, one time dose of tylenol has been given, patient declined ice packs at this time  1500 Methergen given, since it is a hazardous med patient will continue pumping her breast milk and \"pump and dump\". She is concerned about giving her baby milk that may be contaminated with medication. Informed patient to follow up with OB regarding medication safety and breastfeeding.    1530 Report given to Burgess Luisito NIXON

## 2021-11-25 NOTE — Clinical Note
Status[de-identified] INPATIENT [101]   Type of Bed: Intensive Care [6]   Cardiac Monitoring Required?: Yes   Inpatient Hospitalization Certified Necessary for the Following Reasons: 4.  Patient requires ICU level of care interventions (further clarification in H&P documentation)   Admitting Diagnosis: Postpartum hemorrhage [[id number]]   Admitting Physician: Mery Portillo   Attending Physician: Mery Portillo   Estimated Length of Stay: 3-4 Midnights   Discharge Plan[de-identified] Home with Office Follow-up

## 2021-11-25 NOTE — CONSULTS
[facility]  Pulmonary, Critical Care, and Sleep Medicine    Name: Tegan Scott MRN: [id number]   : 1995 Hospital: [facility] [place]    Date: 2021  Room: [address] [place] [address] Note                                              Consult requesting physician: Shirley Brizuela    Reason for Consult: hemorrhage post partum, anemia, sepsis       IMPRESSION:   · Postpartum hemorrhage  · Pleural effusions  · urosepsis  ·   Patient Active Problem List   Diagnosis Code    UTI (urinary tract infection) N39.0    Anemia D64.9    Leukocytosis D72.829    Hypokalemia E87.6    At risk for breastfeeding difficulty Z91.89    Pulmonary edema J81.1    Postpartum hemorrhage O72.1    Shock ([address] R57.9    Sepsis ([address] A41.9    Pleural effusion, bilateral J90    Postoperative complication X20. 9XXA    Fever R50.9 ·       · Code status: Full code       RECOMMENDATIONS:   Respiratory:   Since admission hypoxemic respiratory failure pulmonary edema fluid overload. Patient was recently discharged home on Lasix. Today she is on room air. Slightly decreased breath sounds at the bases. Chest x-ray stable. CXR 2021  IMPRESSIOn  No active cardiopulmonary disease. IMPRESSION CT chest abdomen and pelvic from 10/22/2021 personally reviwed     1. Prominent enhancing uterine and parametrial vessels, with gas in the enlarged  uterus. This is nonspecific and the postpartum/post  state, and can be  normal. CT cannot exclude endometritis if clinically suspected. Contains gas in  the anterior abdominal wall superficial to the musculature, additionally  nonspecific and can be postoperative related to phlegmon/infection. No  circumscribed, drainable collections are seen.     2. Bilateral pleural effusions and septal interstitial thickening, which can be  seen in setting of fluid overload/pulmonary edema. Enlarged IVC additionally  which can be seen in the setting of overhydration. Bibasilar dependent  atelectasis or consolidation.     3. Intraperitoneal free fluid, and fluid about the distended gallbladder,  without calcified gallstones. This is nonspecific and may be related to recent  postoperative state If clinical findings suggest cholecystitis, then dedicated  gallbladder ultrasound could best further assess.     4. Mild bladder wall thickening, which can be seen in the setting of  underdistention or superimposed infection/cystitis, best correlated with  clinical and laboratory/urinalysis findings. No acute issues. Protecting airway.   `  Keep SPO2 >=92%. HOB 30 degree elevation all the time. Aggressive pulmonary toileting. Aspiration precautions. Incentive spirometry. CVS: Initially tachycardic and hypotensive now resolved after blood transfusion. Related to vaginal bleeding. Additionally has elevated white blood cells and UTI rule out sepsis. Recent echo performed on 10/22 normal  · LV: Estimated LVEF is 50 - 55%. Normal cavity size, wall thickness and diastolic function. Low normal systolic function. E/E'= 9.64.  · MV: Moderate mitral valve regurgitation is present. · TV: Mild to moderate tricuspid valve regurgitation is present. · PV: Mild pulmonic valve regurgitation is present. · PA: Pulmonary arterial systolic pressure is 34 mmHg. ID: Fever white blood cells elevated. COVID-19 test negative. UTI. Panculture. Change Zosyn to ceftriaxone to reduce amount of normal saline and should have  good coverage for gram-negative rods. Culture pending. Sepsis bundle and protocol followed. Follow serial lactic acid, frequent BMP check, fluid resuscitation. Follow cultures. Deescalate antibiotic when appropriate. Thick acid pending. Hypotension resolved. Hematology/Oncology: Cytotec and Methergine to reduce vaginal bleeding  Symptomatic acute vaginal bleeding hemoglobin 8.0. Platelets normal.  Slightly elevated INR. Received 2 units of blood.   Mild coagulopathy. Most likely related to recent transfusions given 2 units of FFP. Renal: Normal  GI/: Normal GI prophylaxis  Endocrine: Monitor BS. SSI. Neurology: [facility] alert not in distress  Toxicology: None  Pain/Sedation: RN  Skin/Wound: Post  wound clear no issues  Electrolytes: Replace electrolytes per ICU electrolyte replacement protocol. Has abnormal electrolyte replacement started  IVF: Vent 2 units PRBC and FFP no need for normal saline  Nutrition: Blood diet  Prophylaxis: DVT Prophylaxis: SCD/ GI Prophylaxis: Protonix/  Restraints:   Wrist soft restraints for patient interfering with medical therapy/management and patient safety. Lines/Tubes: PIV    Other:  PT/OT eval and treat. OOB. Advance Directive/Palliative Care: consulted    Will defer respective systems problem management to primary and other respective consultant and follow patient in ICU with primary and other medical team.  Further recommendations will be based on the patient's response to recommended treatment and results of the investigation ordered. Quality Care: PPI, DVT prophylaxis, HOB elevated, Infection control all reviewed and addressed. Care of plan d/w hospitalist team, RN, RT, MDR.  D/w patient  (answered all questions to satisfaction). High complexity decision making was performed during the evaluation of this patient at high risk for decompensation with multiple organ involvement. Total critical care time spent rendering care exclusive of procedures/family discussion/coordination of care: 38 minutes. Subjective/History of Present Illness:     Patient is a 27 y.o. female with PMHx significant for recent  11 days ago, recent admission to [facility] on  with anemia, pulmonary edema, UTI received transfusions antibiotics for UTI and diuretics. Patient was discharged home.   On 2020 1 in the morning she got up after feeding a baby had large amount of vaginal bleed continued to have bleeding, some dizziness. On arrival to emergency room was tachycardic hypotensive tensive with mild hypoxemia. She received IV Pitocin, Cytotec, and 2 L of PRBC. Hemodynamically improved, previous CTA of the chest no pulmonary embolus. Patient started but did not finish her antibiotics for UTI she appears to still have urinary tract infection. OB/GYN admitted we are consulting. Patient admitted to ICU bed 5    2021:  Admitted to ICU bed 5. Acute vaginal bleeding hypotension and tachycardia improving. Given 2 units of PRBC give also 2 units of FFP mild coagulopathy most likely dilutional.  Replace electrolytes per  Blood culture urine culture ordered. Now on ceftriaxone from Zosyn now will help us to reduce amount of normal saline in infusions had pulmonary edema couple of days ago. Consider Lasix after transfusion. Lactic acid pending. Covid negative        I/O last 24 hrs: Intake/Output Summary (Last 24 hours) at 2021 0933  Last data filed at 2021 0800  Gross per 24 hour   Intake 1100 ml   Output --   Net 1100 ml         History taken from patient and EMR    Review of Systems:  A comprehensive review of systems was negative except for that written in the HPI. Review of Systems:   HEENT: No epistaxis, no nasal drainage, no difficulty in swallowing, no redness in eyes  Respiratory: as above  Cardiovascular: no chest pain, no palpitations, no chronic leg edema, no syncope  Gastrointestinal: no abd pain, no vomiting, no diarrhea, no bleeding symptoms  Genitourinary: vaginal bleeding, UTI  Musculoskeletal: Neg  Neurological: No focal weakness, no seizures, no headaches  Behvioral/Psych: No anxiety, no depression  General : No fever, no chills, no weight loss, no night sweats     No Known Allergies   History reviewed. No pertinent past medical history.    Past Surgical History:   Procedure Laterality Date    HX  SECTION      HX  SECTION Social History     Tobacco Use    Smoking status: Never Smoker    Smokeless tobacco: Not on file   Substance Use Topics    Alcohol use: Not Currently      History reviewed. No pertinent family history. Prior to Admission medications    Medication Sig Start Date End Date Taking? Authorizing Provider   ferrous sulfate 325 mg (65 mg iron) tablet Take 1 Tablet by mouth two (2) times daily (with meals). 21   Costa Ann MD   furosemide (Lasix) 20 mg tablet Take 1 Tablet by mouth daily for 5 days. 21  Costa Ann MD   ibuprofen (MOTRIN) 800 mg tablet Take 1 Tablet by mouth every eight (8) hours as needed for Pain. Indications: pain 21   Costa Ann MD   amoxicillin (AMOXIL) 250 mg capsule Take 1 Capsule by mouth three (3) times daily for 5 days. 21  Costa Ann MD   cephALEXin (Keflex) 500 mg capsule Take 1 Capsule by mouth four (4) times daily for 10 days. 21  Austyn CALVIN MD   potassium chloride (K-DUR, KLOR-CON M20) 20 mEq tablet Take 1 Tablet by mouth three (3) times daily for 3 days.  21  Guera Lowery MD     Current Facility-Administered Medications   Medication Dose Route Frequency    sodium chloride (NS) flush 5-40 mL  5-40 mL IntraVENous Q8H    famotidine (PF) (PEPCID) 20 mg in 0.9% sodium chloride 10 mL injection  20 mg IntraVENous BID    methylergonovine (METHERGINE) tablet 200 mcg  200 mcg Oral Q6H    sodium chloride (NS) flush 5-40 mL  5-40 mL IntraVENous Q8H    potassium chloride 10 mEq in 100 ml IVPB  10 mEq IntraVENous Q1H    cefTRIAXone (ROCEPHIN) 1 g in sterile water (preservative free) 10 mL IV syringe  1 g IntraVENous Q24H         Objective:   Vital Signs:    Visit Vitals  /65   Pulse 98   Temp 98.4 °F (36.9 °C)   Resp 18   Ht 5' 4\" (1.626 m)   Wt 74.8 kg (165 lb)   SpO2 100%   BMI 28.32 kg/m²       O2 Device: None (Room air)       Temp (24hrs), Av.8 °F (38.2 °C), Min:98.4 °F (36.9 °C), Max:102.3 °F (39.1 °C) Intake/Output:   Last shift:      11/25 0701 - 11/25 1900  In: 1000 [I.V.:1000]  Out: -     Last 3 shifts: 11/23 1901 - 11/25 0700  In: 100 [I.V.:100]  Out: -       Intake/Output Summary (Last 24 hours) at 11/25/2021 0933  Last data filed at 11/25/2021 0800  Gross per 24 hour   Intake 1100 ml   Output --   Net 1100 ml       Last 3 Recorded Weights in this Encounter    11/25/21 0520   Weight: 74.8 kg (165 lb)             No results for input(s): PHI, PHI, POC2, PCO2I, PO2, PO2I, HCO3, HCO3I, FIO2, FIO2I in the last 72 hours. Physical Exam:     Impresson general : Alert, Awake, NAD  Head:   Normocephalic,atraumatic. ENT:   EOM  no scleral icterus, no pallor, no cyanosis. Nose:   No sinus tenderness  Throat:  Oropharynx ,mucosa, and tongue normal. No oral thrush. Neck:   Supple, symmetric. Lymph nodes. Trachea is midline  Lung: Moderate air entry bilateral equal at the bases decreased breath sounds No rales. No rhonchi. No wheezing. No stridors. No prolongded expiration. No accessory muscle use. Heart:   Regular  rate & rhythm. S1 S2 present. No murmur. No JVD. Abdomen:  Soft. NT. ND. +BS. Post op wounds normla no bleeding or discharge  No masses. liver  and spleen  Extremities:  No pedal edema. No cyanosis. No clubbing. Pulses: 2+ and symmetric in DP. Capillary refill: normal  Lymphatic:  neck and supraclavicular    Musculoskeletal: No joint swelling. No tenderness. Skin:   Lesion Color, texture, turgor normal. No rashes or lesions.        Data:       Recent Results (from the past 24 hour(s))   CBC WITH AUTOMATED DIFF    Collection Time: 11/25/21  5:23 AM   Result Value Ref Range    WBC 17.8 (H) 4.6 - 13.2 K/uL    RBC 2.63 (L) 4.20 - 5.30 M/uL    HGB 8.0 (L) 12.0 - 16.0 g/dL    HCT 24.9 (L) 35.0 - 45.0 %    MCV 94.7 78.0 - 100.0 FL    MCH 30.4 24.0 - 34.0 PG    MCHC 32.1 31.0 - 37.0 g/dL    RDW 14.4 11.6 - 14.5 %    PLATELET 788 (H) 575 - 420 K/uL    MPV 8.7 (L) 9.2 - 11.8 FL    NRBC 0.6 (H) 0 PER 100 WBC    ABSOLUTE NRBC 0.10 (H) 0.00 - 0.01 K/uL    NEUTROPHILS 74 (H) 40 - 73 %    LYMPHOCYTES 14 (L) 21 - 52 %    MONOCYTES 6 3 - 10 %    EOSINOPHILS 0 0 - 5 %    BASOPHILS 0 0 - 2 %    IMMATURE GRANULOCYTES 6 (H) 0.0 - 0.5 %    ABS. NEUTROPHILS 13.2 (H) 1.8 - 8.0 K/UL    ABS. LYMPHOCYTES 2.4 0.9 - 3.6 K/UL    ABS. MONOCYTES 1.0 0.05 - 1.2 K/UL    ABS. EOSINOPHILS 0.1 0.0 - 0.4 K/UL    ABS. BASOPHILS 0.0 0.0 - 0.1 K/UL    ABS. IMM. GRANS. 1.1 (H) 0.00 - 0.04 K/UL    DF AUTOMATED     TYPE & SCREEN    Collection Time: 11/25/21  5:23 AM   Result Value Ref Range    Crossmatch Expiration 11/28/2021,2359     ABO/Rh(D) O POSITIVE     Antibody screen NEG     Unit number [id number]     Blood component type [facility]     Unit division 00     Status of unit ISSUED     Crossmatch result Compatible     Unit number [id number]     Blood component type [facility]     Unit division 00     Status of unit ISSUED     Crossmatch result Compatible    PROTHROMBIN TIME + INR    Collection Time: 11/25/21  5:23 AM   Result Value Ref Range    Prothrombin time 15.3 (H) 11.5 - 15.2 sec    INR 1.3 (H) 0.8 - 1.2     METABOLIC PANEL, COMPREHENSIVE    Collection Time: 11/25/21  5:23 AM   Result Value Ref Range    Sodium 142 136 - 145 mmol/L    Potassium 3.4 (L) 3.5 - 5.5 mmol/L    Chloride 110 100 - 111 mmol/L    CO2 24 21 - 32 mmol/L    Anion gap 8 3.0 - 18 mmol/L    Glucose 116 (H) 74 - 99 mg/dL    BUN 12 7.0 - 18 MG/DL    Creatinine 0.67 0.6 - 1.3 MG/DL    BUN/Creatinine ratio 18 12 - 20      GFR est AA >60 >60 ml/min/1.73m2    GFR est non-AA >60 >60 ml/min/1.73m2    Calcium 8.0 (L) 8.5 - 10.1 MG/DL    Bilirubin, total 0.3 0.2 - 1.0 MG/DL    ALT (SGPT) 17 13 - 56 U/L    AST (SGOT) 11 10 - 38 U/L    Alk.  phosphatase 110 45 - 117 U/L    Protein, total 5.1 (L) 6.4 - 8.2 g/dL    Albumin 1.8 (L) 3.4 - 5.0 g/dL    Globulin 3.3 2.0 - 4.0 g/dL    A-G Ratio 0.5 (L) 0.8 - 1.7     LIPASE    Collection Time: 11/25/21  5:23 AM   Result Value Ref Range Lipase 64 (L) 73 - 393 U/L   MAGNESIUM    Collection Time: 11/25/21  5:23 AM   Result Value Ref Range    Magnesium 1.6 1.6 - 2.6 mg/dL   FIBRINOGEN    Collection Time: 11/25/21  5:23 AM   Result Value Ref Range    Fibrinogen 533 (H) 210 - 451 mg/dL   POC LACTIC ACID    Collection Time: 11/25/21  6:06 AM   Result Value Ref Range    Lactic Acid (POC) 1.36 0.40 - 2.00 mmol/L         Chemistry Recent Labs     11/25/21 0523 11/23/21 0527 11/22/21  0945   * 65* 80    143 142   K 3.4* 3.6 3.7    110 111   CO2 24 24 21   BUN 12 12 10   CREA 0.67 0.65 0.56*   CA 8.0* 8.0* 7.9*   MG 1.6 1.9 1.9   AGAP 8 9 10   BUCR 18 18 18     --  131*   TP 5.1*  --  5.0*   ALB 1.8*  --  1.9*   GLOB 3.3  --  3.1   AGRAT 0.5*  --  0.6*        Lactic Acid No results found for: LAC  No results for input(s): LAC in the last 72 hours. Liver Enzymes Protein, total   Date Value Ref Range Status   11/25/2021 5.1 (L) 6.4 - 8.2 g/dL Final     Albumin   Date Value Ref Range Status   11/25/2021 1.8 (L) 3.4 - 5.0 g/dL Final     Globulin   Date Value Ref Range Status   11/25/2021 3.3 2.0 - 4.0 g/dL Final     A-G Ratio   Date Value Ref Range Status   11/25/2021 0.5 (L) 0.8 - 1.7   Final     Alk.  phosphatase   Date Value Ref Range Status   11/25/2021 110 45 - 117 U/L Final     Recent Labs     11/25/21 0523 11/22/21  0945   TP 5.1* 5.0*   ALB 1.8* 1.9*   GLOB 3.3 3.1   AGRAT 0.5* 0.6*    131*        CBC w/Diff Recent Labs     11/25/21 0523 11/23/21 0527 11/22/21  0945   WBC 17.8* 15.5* 17.0*   RBC 2.63* 2.60* 2.66*   HGB 8.0* 8.0* 8.2*   HCT 24.9* 24.0* 24.8*   * 508* 369   GRANS 74* 74* 81*   LYMPH 14* 11* 13*   EOS 0 1 0        Cardiac Enzymes No results found for: CPK, CK, CKMMB, CKMB, RCK3, CKMBT, CKNDX, CKND1, MARSHALL, TROPT, TROIQ, AIYANA, TROPT, TNIPOC, BNP, BNPP     BNP No results found for: BNP, BNPP, XBNPT     Coagulation Recent Labs     11/25/21  0523   PTP 15.3*   INR 1.3*         Thyroid  No results found for: T4, T3U, TSH, TSHEXT    No results found for: T4     Urinalysis Lab Results   Component Value Date/Time    Color 0 2021 11:40 PM    Appearance TURBID 2021 11:40 PM    Specific gravity 1.025 2021 11:40 PM    pH (UA) 6.5 2021 11:40 PM    Protein 100 (A) 2021 11:40 PM    Glucose Negative 2021 11:40 PM    Ketone TRACE (A) 2021 11:40 PM    Bilirubin Negative 2021 11:40 PM    Urobilinogen 1.0 2021 11:40 PM    Nitrites Negative 2021 11:40 PM    Leukocyte Esterase LARGE (A) 2021 11:40 PM    Epithelial cells FEW 2021 11:40 PM    Bacteria 2+ (A) 2021 11:40 PM    WBC TOO NUMEROUS TO COUNT 2021 11:40 PM    RBC 4 to 10 2021 11:40 PM        Micro  No results for input(s): SDES, CULT in the last 72 hours. No results for input(s): CULT in the last 72 hours. Culture data during this hospitalization. All Micro Results     Procedure Component Value Units Date/Time    COVID-19 RAPID TEST [[id number]] Collected: 21    Order Status: Completed Updated: 21    CULTURE, URINE [[id number]]     Order Status: Sent Specimen: Urine from Clean catch     CULTURE, BLOOD [[id number]] Collected: 21    Order Status: Completed Specimen: Blood Updated: 21    CULTURE, URINE [[id number]]     Order Status: Sent Specimen: Urine from Clean catch     CULTURE, BLOOD [[id number]] Collected: 21    Order Status: Canceled Specimen: Blood                PFT       Ultrasound       LE Doppler       ECHO       Images report reviewed by me:  CT (Most Recent) (CT chest reviewed by me) Results from [place] encounter on 21    CTA CHEST ABD PELV W WO CONT    Narrative  HISTORY:  -From Provider: postpartum fever/ dyspnea/ c section a  -Additional: One-week postop . Chills. Fever.  Passing blood clots  vaginally this exam.    Technique: Axial imaging was obtained dynamically through the pre-and post  contrast aorta using high-resolution CT angiographic protocol, without  complications. In addition, coronal and sagittal reconstructed MIP arteriograms  were performed, to better evaluate the aorta, and to increase sensitivity for  detection of aortic dissection and aneurysm. Dose reduction techniques used: automated exposure control, adjustment of the  mAs and/or kVp according to patient size, and iterative reconstruction  techniques. Comparison study: .    Findings:  Cardiovascular: Aorta: Normal in caliber, without evidence of aneurysm or dissection]. Pulmonary arteries: opacified completely, without evidence of filling defects to  suggest pulmonary thromboembolism. Please note, the study was performed with  optimal contrast bolus timing to the aorta is not optimal for assessment of  pulmonary emboli. Coronary arteries: Unremarkable. Heart: The heart is not enlarged. There is a small pericardial effusion. . ]  Abdominal visceral arteries: Celiac, SMA, bilateral renal arteries, VALERIA without  significant origin disease/calcification. Iliac arteries: unremarkable. CT CHEST:    Pulmonary parenchyma: There is septal interstitial thickening. There is mild  respiratory motion degradation limiting evaluation of the lung parenchyma. Mild  right and minimal left dependent atelectasis/consolidation. Lymph nodes: There is no significant axillary, hilar, or mediastinal  lymphadenopathy. Pleura: Moderate right and small left pleural effusions. Chest wall/lower neck soft tissues:    CT ABDOMEN:    Liver : Unremarkable, without focal lesions. Vascular enhancement: Portal, splenic vein, SMV are unremarkable  Spleen: Unremarkable, without focal lesions. Adrenal glands: Unremarkable, without focal lesions. Pancreas: Unremarkable, without focal lesions. Stomach/duodenum: Unremarkable. Gallbladder: No calcified gallstones or surrounding inflammatory changes  identified.   Biliary system: Unremarkable. Retroperitoneum:  Kidneys: No hydronephrosis or renal calculi. No focal lesions. IVC: Enlarged, not opacified at phase of contrast obtained. .  Lymph nodes:  No significantly enlarged lymph nodes. Intraperitoneal free air:None seen. Intraperitoneal free fluid: Small amount of hypodense fluid about the  gallbladder, liver and in the cul-de-sac. No hyperdense hemorrhage. CT PELVIS:  Bowel : Unremarkable. Appendix: Normal as seen. No localized right lower quadrant or pericecal  inflammatory changes. Bladder: Mild bladder wall thickening 1in the underdistended state. [No bladder  stones are seen.]  Other:  Enlarged postpartum uterus, containing internal gas. Prominent enhancing  uterine and parametrial vessels. There is stranding of the anterior abdominal  wall fat. There are foci of cutaneous gas in the low pelvis superficial to the  anterior abdominal wall. There is hypodense thickening/edema about the  umbilicus. Osseous structures: Unremarkable ]    Impression  1. Prominent enhancing uterine and parametrial vessels, with gas in the enlarged  uterus. This is nonspecific and the postpartum/post  state, and can be  normal. CT cannot exclude endometritis if clinically suspected. Contains gas in  the anterior abdominal wall superficial to the musculature, additionally  nonspecific and can be postoperative related to phlegmon/infection. No  circumscribed, drainable collections are seen. 2. Bilateral pleural effusions and septal interstitial thickening, which can be  seen in setting of fluid overload/pulmonary edema. Enlarged IVC additionally  which can be seen in the setting of overhydration. Bibasilar dependent  atelectasis or consolidation. 3. Intraperitoneal free fluid, and fluid about the distended gallbladder,  without calcified gallstones.  This is nonspecific and may be related to recent  postoperative state If clinical findings suggest cholecystitis, then dedicated  gallbladder ultrasound could best further assess. 4. Mild bladder wall thickening, which can be seen in the setting of  underdistention or superimposed infection/cystitis, best correlated with  clinical and laboratory/urinalysis findings. CXR reviewed by me:  XR (Most Recent). CXR  reviewed by me and compared with previous CXR No results found for this or any previous visit.            Stephanie Cobos MD  11/25/2021

## 2021-11-25 NOTE — CONSULTS
Medicine Consult    Patient:  Surjit Archibald 27 y.o. female  Asked to evaluate patient by Dr. Melody Olivera  Primary Care Provider:  Grace Celestin MD  Date of Admission:  2021  Reason for Consult: hemorrhage, shock        Assessment/Plan     Patient Active Problem List   Diagnosis Code    UTI (urinary tract infection) N39.0    Anemia D64.9    Leukocytosis D72.829    Hypokalemia E87.6    At risk for breastfeeding difficulty Z91.89    Pulmonary edema J81.1    Postpartum hemorrhage O72.1    Shock ([address] R57.9    Sepsis ([address] A41.9       PLAN:  78-year-old  status post uncomplicated pregnancy and  on  with hospital admission for pleural effusions and volume overload with UTI earlier this week is readmitted to the ICU with mixed septic and hemorrhagic shock. She was given cytotec, TXA, and 2U PRBC emergently in the ER. Neuro-no issues  Pulmonary-currently on 3 L nasal cannula for volume overload. CV-had an EF of 50 to 55% on echo done earlier this week and she is on Lasix. I will hold Lasix for now given that she came in with a shock picture. Consider cardiology consultation and avoid aggressive volume resuscitation. GI-no issues  Renal-no issues  Heme-hemorrhagic shock with vaginal bleeding. OB/GYN did a bedside ultrasound which did not show obvious retained products of conception. A formal ultrasound is pending. She is not currently bleeding so they will hold off on OR unless she starts bleeding again. I have ordered fibrinogen degradation products to evaluate for possible DIC. ID-sepsis due to UTI or possible phlegmon of her  site. She is not requiring pressors currently. She is on Zosyn for broad-spectrum antibiotic coverage for now. Low threshold to broaden antibiotics for hemodynamic instability. Follow up blood and urine cultures.   Endo-no issues  F/E/N-Albumin to use preferentially given volume overload/replete as needed/only water for now in case she needs OR later today    Breast pump to bedside. She may continue to feed the baby breastmilk. Her  was at bedside and updated of the care plan. Pedro Childress MD  [facility]    Critical Care Time [id number]    50 minutes of critical care time spent in the direct evaluation and treatment of this high risk patient. The reason for providing this level of medical care for this critically ill patient was due a critical illness that impaired one or more vital organ systems such that there was a high probability of imminent or life threatening deterioration in the patients condition. This care involved high complexity decision making to assess, manipulate, and support vital system functions, to treat this degreee vital organ system failure and to prevent further life threatening deterioration of the patients condition. Thank you for allowing us to participate in this patient's care. HPI:   CC: Maury Arevalo is a 27 y.o. female G1, P1 status post uncomplicated pregnancy and  delivery with admission for postoperative volume overload and UTI presents to the ED with sudden onset of vaginal hemorrhage this morning. She was just having heavy menstrual bleeding since her . She woke up to feed the baby and thought she had urinated on herself but noticed that there was a lot of blood on her baby's swaddle. Her  called EMS as she continued to bleed heavily from the vagina and felt very lightheaded. She has never had any bleeding issues in the past.  She had been feeling well since discharge from the hospital yesterday. She was started on Lasix and amoxicillin. She is lactating. She denies any chest pain or shortness of breath. She has some mild abdominal pain. History reviewed. No pertinent past medical history.   Past Surgical History:   Procedure Laterality Date    HX  SECTION      HX  SECTION        Social History     Tobacco Use    Smoking status: Never Smoker    Smokeless tobacco: Not on file   Substance Use Topics    Alcohol use: Not Currently    Drug use: Never     History reviewed. No pertinent family history. No current facility-administered medications on file prior to encounter. Current Outpatient Medications on File Prior to Encounter   Medication Sig Dispense Refill    ferrous sulfate 325 mg (65 mg iron) tablet Take 1 Tablet by mouth two (2) times daily (with meals). 60 Tablet 0    furosemide (Lasix) 20 mg tablet Take 1 Tablet by mouth daily for 5 days. 5 Tablet 0    ibuprofen (MOTRIN) 800 mg tablet Take 1 Tablet by mouth every eight (8) hours as needed for Pain. Indications: pain 9 Tablet 0    amoxicillin (AMOXIL) 250 mg capsule Take 1 Capsule by mouth three (3) times daily for 5 days. 15 Capsule 0    cephALEXin (Keflex) 500 mg capsule Take 1 Capsule by mouth four (4) times daily for 10 days. 40 Capsule 0    potassium chloride (K-DUR, KLOR-CON M20) 20 mEq tablet Take 1 Tablet by mouth three (3) times daily for 3 days.  9 Tablet 0      No Known Allergies        Review of Systems  Constitutional: +fever, chills, malaise, fatigue  Skin: no itching or rashes  HEENT: no ear discomfort, hearing loss, tinnitus, epistaxis or sore throat  EYES: no blurry vision, double vision or photophobia  CARDIOVASCULAR: no claudication, cp, palpitations, orthopnea, pnd, or LE edema  PULMONARY: no cough, wheeze, shortness of breath or sputum production  GI: no nausea, vomiting, diarrhea, abdominal pain, melena, hematemesis or brbpr  : no dysuria, hematuria  MUSCULOSKELETAL: no back pain, joint pain or myalgias  ENDOCRINE: no heat/cold intolerance, polyuria or polydipsia  HEME: heavy vaginal bleeding  NEURO: no unilateral weakness, numbness, tingling or seizures      Physical Exam:      Visit Vitals  /74   Pulse 93   Temp (!) 102 °F (38.9 °C)   Resp 18   Ht 5' 4\" (1.626 m)   Wt 74.8 kg (165 lb)   SpO2 100%   BMI 28.32 kg/m²     Body mass index is 28.32 kg/m². Physical Exam:  GEN: ill-appearing, pale, laying in bed in no acute distress  HEENT: atraumatic, nose normal,oropharynx clear  NECK: supple, trachea midline  EYES: conjuctiva normal, lids with out lesions  BREASTS: no evidence of mastitis  HEART: RRR with out m/r/g, pmi nondisplaced, pulses 2+ distally  LUNGS: equal chest wall expansion, cta bl with out wheezes/rales or rhonchi  AB: soft, +BS, mild tenderness of lower abdomen but no rebound tenderness; incision appears clean/dry/intact; no organomegaly  NEURO: alert, awake and oriented x3  SKIN: dry, intact, warm no breakdown noted        Laboratory Studies:    Recent Results (from the past 24 hour(s))   CBC WITH AUTOMATED DIFF    Collection Time: 11/25/21  5:23 AM   Result Value Ref Range    WBC 17.8 (H) 4.6 - 13.2 K/uL    RBC 2.63 (L) 4.20 - 5.30 M/uL    HGB 8.0 (L) 12.0 - 16.0 g/dL    HCT 24.9 (L) 35.0 - 45.0 %    MCV 94.7 78.0 - 100.0 FL    MCH 30.4 24.0 - 34.0 PG    MCHC 32.1 31.0 - 37.0 g/dL    RDW 14.4 11.6 - 14.5 %    PLATELET 599 (H) 543 - 420 K/uL    MPV 8.7 (L) 9.2 - 11.8 FL    NRBC 0.6 (H) 0  WBC    ABSOLUTE NRBC 0.10 (H) 0.00 - 0.01 K/uL    NEUTROPHILS 74 (H) 40 - 73 %    LYMPHOCYTES 14 (L) 21 - 52 %    MONOCYTES 6 3 - 10 %    EOSINOPHILS 0 0 - 5 %    BASOPHILS 0 0 - 2 %    IMMATURE GRANULOCYTES 6 (H) 0.0 - 0.5 %    ABS. NEUTROPHILS 13.2 (H) 1.8 - 8.0 K/UL    ABS. LYMPHOCYTES 2.4 0.9 - 3.6 K/UL    ABS. MONOCYTES 1.0 0.05 - 1.2 K/UL    ABS. EOSINOPHILS 0.1 0.0 - 0.4 K/UL    ABS. BASOPHILS 0.0 0.0 - 0.1 K/UL    ABS. IMM. GRANS.  1.1 (H) 0.00 - 0.04 K/UL    DF AUTOMATED     TYPE & SCREEN    Collection Time: 11/25/21  5:23 AM   Result Value Ref Range    Crossmatch Expiration 11/28/2021,2359     ABO/Rh(D) O POSITIVE     Antibody screen NEG     Unit number [id number]     Blood component type [facility]     Unit division 00     Status of unit ISSUED     Crossmatch result Compatible     Unit number [id number]     Blood component type [facility] Unit division 00     Status of unit ISSUED     Crossmatch result Compatible    PROTHROMBIN TIME + INR    Collection Time: 21  5:23 AM   Result Value Ref Range    Prothrombin time 15.3 (H) 11.5 - 15.2 sec    INR 1.3 (H) 0.8 - 1.2     METABOLIC PANEL, COMPREHENSIVE    Collection Time: 21  5:23 AM   Result Value Ref Range    Sodium 142 136 - 145 mmol/L    Potassium 3.4 (L) 3.5 - 5.5 mmol/L    Chloride 110 100 - 111 mmol/L    CO2 24 21 - 32 mmol/L    Anion gap 8 3.0 - 18 mmol/L    Glucose 116 (H) 74 - 99 mg/dL    BUN 12 7.0 - 18 MG/DL    Creatinine 0.67 0.6 - 1.3 MG/DL    BUN/Creatinine ratio 18 12 - 20      GFR est AA >60 >60 ml/min/1.73m2    GFR est non-AA >60 >60 ml/min/1.73m2    Calcium 8.0 (L) 8.5 - 10.1 MG/DL    Bilirubin, total 0.3 0.2 - 1.0 MG/DL    ALT (SGPT) 17 13 - 56 U/L    AST (SGOT) 11 10 - 38 U/L    Alk. phosphatase 110 45 - 117 U/L    Protein, total 5.1 (L) 6.4 - 8.2 g/dL    Albumin 1.8 (L) 3.4 - 5.0 g/dL    Globulin 3.3 2.0 - 4.0 g/dL    A-G Ratio 0.5 (L) 0.8 - 1.7     LIPASE    Collection Time: 21  5:23 AM   Result Value Ref Range    Lipase 64 (L) 73 - 393 U/L   MAGNESIUM    Collection Time: 21  5:23 AM   Result Value Ref Range    Magnesium 1.6 1.6 - 2.6 mg/dL   POC LACTIC ACID    Collection Time: 21  6:06 AM   Result Value Ref Range    Lactic Acid (POC) 1.36 0.40 - 2.00 mmol/L     Results  CTA CHEST ABD PELV W WO CONT (Accession [id number]) (Order [id number])    Allergies       No Known Allergies     Exam Information    Status Exam Begun  Exam Ended    Final [99] 2021 06:20 2021  6:49 AM [id number]  6:49 AM     Result Information    Status: Final result (Exam End: 2021 06:49) Provider Status: Open       CTA CHEST ABD PELV W WO CONT: Patient Communication     Released  Not seen     Study Result    Narrative & Impression   HISTORY:   -From Provider: postpartum fever/ dyspnea/ c section a  -Additional: One-week postop . Chills. Fever.  Passing blood clots  vaginally this exam.     Technique: Axial imaging was obtained dynamically through the pre-and post  contrast aorta using high-resolution CT angiographic protocol, without  complications. In addition, coronal and sagittal reconstructed MIP arteriograms  were performed, to better evaluate the aorta, and to increase sensitivity for  detection of aortic dissection and aneurysm.     Dose reduction techniques used: automated exposure control, adjustment of the  mAs and/or kVp according to patient size, and iterative reconstruction  techniques.     Comparison study: .     Findings:  Cardiovascular: Aorta: Normal in caliber, without evidence of aneurysm or dissection]. Pulmonary arteries: opacified completely, without evidence of filling defects to  suggest pulmonary thromboembolism. Please note, the study was performed with  optimal contrast bolus timing to the aorta is not optimal for assessment of  pulmonary emboli. Coronary arteries: Unremarkable. Heart: The heart is not enlarged. There is a small pericardial effusion. . ]  Abdominal visceral arteries: Celiac, SMA, bilateral renal arteries, VALERIA without  significant origin disease/calcification. Iliac arteries: unremarkable.      CT CHEST:     Pulmonary parenchyma: There is septal interstitial thickening. There is mild  respiratory motion degradation limiting evaluation of the lung parenchyma. Mild  right and minimal left dependent atelectasis/consolidation. Lymph nodes: There is no significant axillary, hilar, or mediastinal  lymphadenopathy. Pleura: Moderate right and small left pleural effusions. Chest wall/lower neck soft tissues:     CT ABDOMEN:     Liver : Unremarkable, without focal lesions. Vascular enhancement: Portal, splenic vein, SMV are unremarkable  Spleen: Unremarkable, without focal lesions. Adrenal glands: Unremarkable, without focal lesions. Pancreas: Unremarkable, without focal lesions. Stomach/duodenum: Unremarkable. Gallbladder: No calcified gallstones or surrounding inflammatory changes  identified. Biliary system: Unremarkable.      Retroperitoneum:  Kidneys: No hydronephrosis or renal calculi. No focal lesions. IVC: Enlarged, not opacified at phase of contrast obtained. .  Lymph nodes:  No significantly enlarged lymph nodes. Intraperitoneal free air:None seen. Intraperitoneal free fluid: Small amount of hypodense fluid about the  gallbladder, liver and in the cul-de-sac. No hyperdense hemorrhage.     CT PELVIS:  Bowel : Unremarkable. Appendix: Normal as seen. No localized right lower quadrant or pericecal  inflammatory changes. Bladder: Mild bladder wall thickening 1in the underdistended state. [No bladder  stones are seen.]  Other:  Enlarged postpartum uterus, containing internal gas. Prominent enhancing  uterine and parametrial vessels. There is stranding of the anterior abdominal  wall fat. There are foci of cutaneous gas in the low pelvis superficial to the  anterior abdominal wall. There is hypodense thickening/edema about the  umbilicus.     Osseous structures: Unremarkable ]      IMPRESSION     1. Prominent enhancing uterine and parametrial vessels, with gas in the enlarged  uterus. This is nonspecific and the postpartum/post  state, and can be  normal. CT cannot exclude endometritis if clinically suspected. Contains gas in  the anterior abdominal wall superficial to the musculature, additionally  nonspecific and can be postoperative related to phlegmon/infection. No  circumscribed, drainable collections are seen.     2. Bilateral pleural effusions and septal interstitial thickening, which can be  seen in setting of fluid overload/pulmonary edema. Enlarged IVC additionally  which can be seen in the setting of overhydration. Bibasilar dependent  atelectasis or consolidation.     3. Intraperitoneal free fluid, and fluid about the distended gallbladder,  without calcified gallstones.  This is nonspecific and may be related to recent  postoperative state If clinical findings suggest cholecystitis, then dedicated  gallbladder ultrasound could best further assess.     4. Mild bladder wall thickening, which can be seen in the setting of  underdistention or superimposed infection/cystitis, best correlated with  clinical and laboratory/urinalysis findings.

## 2021-11-25 NOTE — ROUTINE PROCESS
TRANSFER - OUT REPORT:    Verbal report given to Carl Lu RN on MAIRA Vergara  being transferred to ICU for routine progression of care       Report consisted of patients Situation, Background, Assessment and   Recommendations(SBAR). Information from the following report(s) SBAR was reviewed with the receiving nurse. Lines:   Peripheral IV 11/25/21 Left Antecubital (Active)       Peripheral IV 11/25/21 Right Antecubital (Active)   Site Assessment Clean, dry, & intact 11/25/21 0523   Phlebitis Assessment 0 11/25/21 0523   Infiltration Assessment 0 11/25/21 0523   Dressing Status Clean, dry, & intact; Occlusive 11/25/21 0523   Dressing Type Transparent 11/25/21 0523   Hub Color/Line Status Green; Flushed; Patent 11/25/21 0523   Action Taken Blood drawn 11/25/21 0523        Opportunity for questions and clarification was provided.       Patient transported with:   Monitor  O2 @ 2 liters  Registered Nurse

## 2021-11-25 NOTE — PROGRESS NOTES
Post-Operative  Day 11/HD 0    Karina Hsieh       Assessment:   Hospital Problems  Date Reviewed: 2021          Codes Class Noted POA    Postpartum hemorrhage ICD-10-CM: O72.1  ICD-9-CM: 666.10  2021 Yes        Shock ([facility] [address] ICD-10-CM: R57.9  ICD-9-CM: 785.50  2021 Yes        Sepsis ([address] ICD-10-CM: A41.9  ICD-9-CM: 038.9, 995.91  2021 Yes        Pleural effusion, bilateral ICD-10-CM: J90  ICD-9-CM: 511.9  2021 Yes        Postoperative complication [address]: Y33. [id number]  ICD-9-CM: 998.9  2021 Yes        Fever ICD-10-CM: R50.9  ICD-9-CM: 780.60  2021 Yes        UTI (urinary tract infection) ICD-10-CM: N39.0  ICD-9-CM: 599.0  2021 Yes            Post-Op day 11/ HD 0    - PPH - Bleeding has improved. Minimal lochia on pad x several hours. No pain. Continue methergine for   Now. Will need to pump and dump  - Wound infection - purulent discharge from right side of incision. No erythema or warmth. Wound intact. Culture sent. Continue antibiotics  - PP endometritis and UTI - continue antibiotics. Serial CBCs and monitor fever.  -  VTE prophylaxis - apply SCD      Patient doing well without significant complaint. Tolerating diet, passing flatus, and voiding.     Current Facility-Administered Medications   Medication Dose Route Frequency    sodium chloride (NS) flush 5-40 mL  5-40 mL IntraVENous Q8H    famotidine (PF) (PEPCID) 20 mg in 0.9% sodium chloride 10 mL injection  20 mg IntraVENous BID    methylergonovine (METHERGINE) tablet 200 mcg  200 mcg Oral Q6H    sodium chloride (NS) flush 5-40 mL  5-40 mL IntraVENous Q8H    piperacillin-tazobactam (ZOSYN) 3.375 g in 0.9% sodium chloride (MBP/ADV) 100 mL MBP  3.375 g IntraVENous Q8H       Vitals:  Visit Vitals  /71   Pulse (!) 106   Temp (!) 100.5 °F (38.1 °C)   Resp (!) 41   Ht 5' 4\" (1.626 m)   Wt 74.8 kg (165 lb)   SpO2 99%   BMI 28.32 kg/m²     Temp (24hrs), Av.4 °F (38 °C), Min:98.4 °F (36.9 °C), Max:102.3 °F (39.1 °C)        Exam:        Patient without distress. Abdomen, bowel sounds present, soft, expected tenderness, fundus firm      Wound incision minimal separation at right lateral aspect draining purulent discharge. No                erythema. Mild tenderness                Lower extremities are negative for swelling, cords or tenderness. Labs:   Lab Results   Component Value Date/Time    WBC 22.8 (H) 11/25/2021 12:49 PM    WBC 17.8 (H) 11/25/2021 05:23 AM    WBC 15.5 (H) 11/23/2021 05:27 AM    HGB 8.4 (L) 11/25/2021 12:49 PM    HGB 8.0 (L) 11/25/2021 05:23 AM    HGB 8.0 (L) 11/23/2021 05:27 AM    HCT 25.3 (L) 11/25/2021 12:49 PM    HCT 24.9 (L) 11/25/2021 05:23 AM    HCT 24.0 (L) 11/23/2021 05:27 AM    PLATELET 647 85/14/6358 12:49 PM    PLATELET 395 (H) 58/56/0279 05:23 AM    PLATELET 183 (H) 17/94/5471 05:27 AM       Recent Results (from the past 24 hour(s))   CBC WITH AUTOMATED DIFF    Collection Time: 11/25/21  5:23 AM   Result Value Ref Range    WBC 17.8 (H) 4.6 - 13.2 K/uL    RBC 2.63 (L) 4.20 - 5.30 M/uL    HGB 8.0 (L) 12.0 - 16.0 g/dL    HCT 24.9 (L) 35.0 - 45.0 %    MCV 94.7 78.0 - 100.0 FL    MCH 30.4 24.0 - 34.0 PG    MCHC 32.1 31.0 - 37.0 g/dL    RDW 14.4 11.6 - 14.5 %    PLATELET 606 (H) 679 - 420 K/uL    MPV 8.7 (L) 9.2 - 11.8 FL    NRBC 0.6 (H) 0  WBC    ABSOLUTE NRBC 0.10 (H) 0.00 - 0.01 K/uL    NEUTROPHILS 74 (H) 40 - 73 %    LYMPHOCYTES 14 (L) 21 - 52 %    MONOCYTES 6 3 - 10 %    EOSINOPHILS 0 0 - 5 %    BASOPHILS 0 0 - 2 %    IMMATURE GRANULOCYTES 6 (H) 0.0 - 0.5 %    ABS. NEUTROPHILS 13.2 (H) 1.8 - 8.0 K/UL    ABS. LYMPHOCYTES 2.4 0.9 - 3.6 K/UL    ABS. MONOCYTES 1.0 0.05 - 1.2 K/UL    ABS. EOSINOPHILS 0.1 0.0 - 0.4 K/UL    ABS. BASOPHILS 0.0 0.0 - 0.1 K/UL    ABS. IMM. GRANS.  1.1 (H) 0.00 - 0.04 K/UL    DF AUTOMATED     TYPE & SCREEN    Collection Time: 11/25/21  5:23 AM   Result Value Ref Range    Crossmatch Expiration 11/28/2021,2359     ABO/Rh(D) O POSITIVE     Antibody screen NEG     Unit number [id number]     Blood component type [facility]     Unit division 00     Status of unit ISSUED     Crossmatch result Compatible     Unit number [id number]     Blood component type [facility]     Unit division 00     Status of unit ISSUED     Crossmatch result Compatible    PROTHROMBIN TIME + INR    Collection Time: 11/25/21  5:23 AM   Result Value Ref Range    Prothrombin time 15.3 (H) 11.5 - 15.2 sec    INR 1.3 (H) 0.8 - 1.2     METABOLIC PANEL, COMPREHENSIVE    Collection Time: 11/25/21  5:23 AM   Result Value Ref Range    Sodium 142 136 - 145 mmol/L    Potassium 3.4 (L) 3.5 - 5.5 mmol/L    Chloride 110 100 - 111 mmol/L    CO2 24 21 - 32 mmol/L    Anion gap 8 3.0 - 18 mmol/L    Glucose 116 (H) 74 - 99 mg/dL    BUN 12 7.0 - 18 MG/DL    Creatinine 0.67 0.6 - 1.3 MG/DL    BUN/Creatinine ratio 18 12 - 20      GFR est AA >60 >60 ml/min/1.73m2    GFR est non-AA >60 >60 ml/min/1.73m2    Calcium 8.0 (L) 8.5 - 10.1 MG/DL    Bilirubin, total 0.3 0.2 - 1.0 MG/DL    ALT (SGPT) 17 13 - 56 U/L    AST (SGOT) 11 10 - 38 U/L    Alk. phosphatase 110 45 - 117 U/L    Protein, total 5.1 (L) 6.4 - 8.2 g/dL    Albumin 1.8 (L) 3.4 - 5.0 g/dL    Globulin 3.3 2.0 - 4.0 g/dL    A-G Ratio 0.5 (L) 0.8 - 1.7     LIPASE    Collection Time: 11/25/21  5:23 AM   Result Value Ref Range    Lipase 64 (L) 73 - 393 U/L   MAGNESIUM    Collection Time: 11/25/21  5:23 AM   Result Value Ref Range    Magnesium 1.6 1.6 - 2.6 mg/dL   FIBRINOGEN    Collection Time: 11/25/21  5:23 AM   Result Value Ref Range    Fibrinogen 533 (H) 210 - 451 mg/dL   FDP, PLASMA    Collection Time: 11/25/21  5:23 AM   Result Value Ref Range    FIBRIN DEGRAD.  PROD. >20 <5 UG/ML   CULTURE, BLOOD    Collection Time: 11/25/21  6:05 AM    Specimen: Blood   Result Value Ref Range    Special Requests: NO SPECIAL REQUESTS      Culture result: NO GROWTH AFTER 5 HOURS     POC LACTIC ACID    Collection Time: 11/25/21 6:06 AM   Result Value Ref Range    Lactic Acid (POC) 1.36 0.40 - 2.00 mmol/L   PLASMA, ALLOCATE    Collection Time: 11/25/21  8:30 AM   Result Value Ref Range    CALLED TO:       1 UNIT OF [facility] READY TO [facility] ICU ON 11/25/21 AT [address] [facility]    Unit number [id number]     Blood component type FP 24h, Thaw     Unit division 00     Status of unit ALLOCATED    COVID-19 RAPID TEST    Collection Time: 11/25/21  9:10 AM   Result Value Ref Range    Specimen source Nasopharyngeal      COVID-19 rapid test Not detected NOTD     CBC WITH AUTOMATED DIFF    Collection Time: 11/25/21 12:49 PM   Result Value Ref Range    WBC 22.8 (H) 4.6 - 13.2 K/uL    RBC 2.82 (L) 4.20 - 5.30 M/uL    HGB 8.4 (L) 12.0 - 16.0 g/dL    HCT 25.3 (L) 35.0 - 45.0 %    MCV 89.7 78.0 - 100.0 FL    MCH 29.8 24.0 - 34.0 PG    MCHC 33.2 31.0 - 37.0 g/dL    RDW 14.9 (H) 11.6 - 14.5 %    PLATELET 440 066 - 327 K/uL    MPV 8.7 (L) 9.2 - 11.8 FL    NRBC 0.1 (H) 0  WBC    ABSOLUTE NRBC 0.02 (H) 0.00 - 0.01 K/uL    NEUTROPHILS 78 (H) 40 - 73 %    BAND NEUTROPHILS 3 %    LYMPHOCYTES 9 (L) 21 - 52 %    MONOCYTES 6 3 - 10 %    EOSINOPHILS 0 0 - 5 %    BASOPHILS 0 0 - 2 %    IMMATURE GRANULOCYTES 4 (H) 0.0 - 0.5 %    ABS. NEUTROPHILS 18.4 (H) 1.8 - 8.0 K/UL    ABS. LYMPHOCYTES 2.1 0.9 - 3.6 K/UL    ABS. MONOCYTES 1.4 (H) 0.05 - 1.2 K/UL    ABS. EOSINOPHILS 0.0 0.0 - 0.4 K/UL    ABS. BASOPHILS 0.0 0.0 - 0.1 K/UL    ABS. IMM.  GRANS. 0.9 (H) 0.00 - 0.04 K/UL    DF MANUAL      RBC COMMENTS NORMOCYTIC, NORMOCHROMIC     PROTHROMBIN TIME + INR    Collection Time: 11/25/21 12:49 PM   Result Value Ref Range    Prothrombin time 15.5 (H) 11.5 - 15.2 sec    INR 1.3 (H) 0.8 - 1.2     URINALYSIS W/MICROSCOPIC    Collection Time: 11/25/21  2:00 PM   Result Value Ref Range    Color ORANGE      Appearance HAZY      Specific gravity 1.008 1.003 - 1.030      pH (UA) 7.0 5.0 - 8.0      Protein Negative NEG mg/dL    Glucose Negative NEG mg/dL    Ketone Negative NEG mg/dL Bilirubin Negative NEG      Blood LARGE (A) NEG      Urobilinogen 0.2 0.2 - 1.0 EU/dL    Nitrites Negative NEG      Leukocyte Esterase MODERATE (A) NEG      WBC 21 to 35 0 - 5 /hpf    RBC TOO NUMEROUS TO COUNT 0 - 5 /hpf    Epithelial cells 1+ 0 - 5 /lpf    Bacteria FEW (A) NEG /hpf     Blease Goldberg, MD

## 2021-11-25 NOTE — PROGRESS NOTES
Reason for Admission:  Delayed Post Partum Hemorrhage                     RUR Score:                     Plan for utilizing home health:    Unlikely       PCP: First and Last name:  Asha Tobin MD     Name of Practice:    Are you a current patient: Yes/No:    Approximate date of last visit:    Can you participate in a virtual visit with your PCP:                     Current Advanced Directive/Advance Care Plan: Full Code      Healthcare Decision Maker:   Click here to complete [address] including selection of the Healthcare Decision Maker Relationship (ie \"Primary\")             Primary Decision Maker: dar moe - Spouse - [phone]                  Transition of Care Plan:     Home with physician follow up                  Chart reviewed. Per H&P David Stiles is a 27 y.o. female PPD #11 s/p 1 LTCS for failure to progress. Pt was readmitted 11/21 at [facility] to Hospitalist service for pulmonary edema, anemia, UTI and received transfusion, lasix, abx. She was dc'd home on oral abx. This AM when she got up to feed her baby she had a large gush of blood vaginally and continued heavy bleeding. She came to ER via EMS. On arrival she was tachycardic, hypotensive with hypoxia. Since arrival she received IV pitocin and cytotec and bleeding has reduced. She is currently receiving her second unit of PRBC's. She reports no fever or chills at home. No nausea or vomiting. She has had fatigue. No CP or SOB. \"    Pt was recently discharged (11/23/21). Pt is  and independent. Anticipate pt will transition home with physician follow up when medically stable. Pt currently with plan transfer to ICU. Please encourage ambulation as appropriate. CM to continue to follow and assist.    Care Management Interventions  Mode of Transport at Discharge:  Other (see comment) (spouse)  Transition of Care Consult (CM Consult): Discharge Planning  Health Maintenance Reviewed: Yes  Support Systems: Spouse/Significant Other  The Plan for Transition of Care is Related to the Following Treatment Goals : Home with physician follow up  Discharge Location  Discharge Placement: Home with family assistance      Readmission Assessment  Number of days since last admission?: 1-7 days  Previous disposition: Home with Family  Did you visit your Primary Care Physician after you left the hospital, before you returned this time?: No  Why weren't you able to visit your PCP?: Other (Comment) (readmitted with in 48 hours)  Did you see a specialist, such as Cardiac, Pulmonary, Orthopedic Physician, etc. after you left the hospital?: No  Who advised the patient to return to the hospital?: Self-referral  Does the patient report anything that got in the way of taking their medications?: No

## 2021-11-26 ENCOUNTER — HOSPITAL ENCOUNTER (INPATIENT)
Dept: ULTRASOUND IMAGING | Age: 26
Discharge: HOME | DRG: 776 | End: 2021-11-26
Attending: OBSTETRICS & GYNECOLOGY
Payer: COMMERCIAL

## 2021-11-26 PROBLEM — T14.8XXA WOUND INFECTION: Status: ACTIVE | Noted: 2021-11-26

## 2021-11-26 PROBLEM — L08.9 WOUND INFECTION: Status: ACTIVE | Noted: 2021-11-26

## 2021-11-26 LAB
ABO + RH BLD: NORMAL
ALBUMIN SERPL-MCNC: 1.8 G/DL (ref 3.4–5)
ALBUMIN/GLOB SERPL: 0.6 {RATIO} (ref 0.8–1.7)
ALP SERPL-CCNC: 91 U/L (ref 45–117)
ALT SERPL-CCNC: 16 U/L (ref 13–56)
ANION GAP SERPL CALC-SCNC: 7 MMOL/L (ref 3–18)
APTT PPP: 33.8 SEC (ref 23–36.4)
AST SERPL-CCNC: 10 U/L (ref 10–38)
BASOPHILS # BLD: 0.1 K/UL (ref 0–0.1)
BASOPHILS NFR BLD: 0 % (ref 0–2)
BILIRUB SERPL-MCNC: 0.4 MG/DL (ref 0.2–1)
BLD PROD TYP BPU: NORMAL
BLD PROD TYP BPU: NORMAL
BLOOD GROUP ANTIBODIES SERPL: NORMAL
BPU ID: NORMAL
BPU ID: NORMAL
BUN SERPL-MCNC: 5 MG/DL (ref 7–18)
BUN/CREAT SERPL: 8 (ref 12–20)
CALCIUM SERPL-MCNC: 8.1 MG/DL (ref 8.5–10.1)
CHLORIDE SERPL-SCNC: 111 MMOL/L (ref 100–111)
CO2 SERPL-SCNC: 24 MMOL/L (ref 21–32)
CREAT SERPL-MCNC: 0.59 MG/DL (ref 0.6–1.3)
CROSSMATCH RESULT,%XM: NORMAL
CROSSMATCH RESULT,%XM: NORMAL
DIFFERENTIAL METHOD BLD: ABNORMAL
EOSINOPHIL # BLD: 0 K/UL (ref 0–0.4)
EOSINOPHIL NFR BLD: 0 % (ref 0–5)
ERYTHROCYTE [DISTWIDTH] IN BLOOD BY AUTOMATED COUNT: 15.3 % (ref 11.6–14.5)
ERYTHROCYTE [DISTWIDTH] IN BLOOD BY AUTOMATED COUNT: 15.4 % (ref 11.6–14.5)
ERYTHROCYTE [DISTWIDTH] IN BLOOD BY AUTOMATED COUNT: 15.5 % (ref 11.6–14.5)
GLOBULIN SER CALC-MCNC: 3.1 G/DL (ref 2–4)
GLUCOSE BLD STRIP.AUTO-MCNC: 91 MG/DL (ref 70–110)
GLUCOSE SERPL-MCNC: 86 MG/DL (ref 74–99)
HCT VFR BLD AUTO: 24.2 % (ref 35–45)
HCT VFR BLD AUTO: 24.8 % (ref 35–45)
HCT VFR BLD AUTO: 25.2 % (ref 35–45)
HGB BLD-MCNC: 7.8 G/DL (ref 12–16)
HGB BLD-MCNC: 8.1 G/DL (ref 12–16)
HGB BLD-MCNC: 8.2 G/DL (ref 12–16)
IMM GRANULOCYTES # BLD AUTO: 1.2 K/UL (ref 0–0.04)
IMM GRANULOCYTES NFR BLD AUTO: 6 % (ref 0–0.5)
INR PPP: 1.3 (ref 0.8–1.2)
LYMPHOCYTES # BLD: 2 K/UL (ref 0.9–3.6)
LYMPHOCYTES NFR BLD: 11 % (ref 21–52)
MCH RBC QN AUTO: 29 PG (ref 24–34)
MCH RBC QN AUTO: 29.3 PG (ref 24–34)
MCH RBC QN AUTO: 30.6 PG (ref 24–34)
MCHC RBC AUTO-ENTMCNC: 32.1 G/DL (ref 31–37)
MCHC RBC AUTO-ENTMCNC: 32.2 G/DL (ref 31–37)
MCHC RBC AUTO-ENTMCNC: 33.1 G/DL (ref 31–37)
MCV RBC AUTO: 90.3 FL (ref 78–100)
MCV RBC AUTO: 91 FL (ref 78–100)
MCV RBC AUTO: 92.5 FL (ref 78–100)
MONOCYTES # BLD: 1.1 K/UL (ref 0.05–1.2)
MONOCYTES NFR BLD: 6 % (ref 3–10)
NEUTS SEG # BLD: 14.8 K/UL (ref 1.8–8)
NEUTS SEG NFR BLD: 77 % (ref 40–73)
NRBC # BLD: 0 K/UL (ref 0–0.01)
NRBC BLD-RTO: 0 PER 100 WBC
PLATELET # BLD AUTO: 318 K/UL (ref 135–420)
PLATELET # BLD AUTO: 343 K/UL (ref 135–420)
PLATELET # BLD AUTO: 442 K/UL (ref 135–420)
PMV BLD AUTO: 8.7 FL (ref 9.2–11.8)
PMV BLD AUTO: 8.7 FL (ref 9.2–11.8)
PMV BLD AUTO: 8.8 FL (ref 9.2–11.8)
POTASSIUM SERPL-SCNC: 3.9 MMOL/L (ref 3.5–5.5)
PROT SERPL-MCNC: 4.9 G/DL (ref 6.4–8.2)
PROTHROMBIN TIME: 15.6 SEC (ref 11.5–15.2)
RBC # BLD AUTO: 2.66 M/UL (ref 4.2–5.3)
RBC # BLD AUTO: 2.68 M/UL (ref 4.2–5.3)
RBC # BLD AUTO: 2.79 M/UL (ref 4.2–5.3)
SODIUM SERPL-SCNC: 142 MMOL/L (ref 136–145)
SPECIMEN EXP DATE BLD: NORMAL
STATUS OF UNIT,%ST: NORMAL
STATUS OF UNIT,%ST: NORMAL
UNIT DIVISION, %UDIV: 0
UNIT DIVISION, %UDIV: 0
WBC # BLD AUTO: 17.5 K/UL (ref 4.6–13.2)
WBC # BLD AUTO: 19.2 K/UL (ref 4.6–13.2)
WBC # BLD AUTO: 19.4 K/UL (ref 4.6–13.2)

## 2021-11-26 PROCEDURE — 74011250636 HC RX REV CODE- 250/636: Performed by: INTERNAL MEDICINE

## 2021-11-26 PROCEDURE — 36415 COLL VENOUS BLD VENIPUNCTURE: CPT

## 2021-11-26 PROCEDURE — 74011250636 HC RX REV CODE- 250/636: Performed by: HOSPITALIST

## 2021-11-26 PROCEDURE — 85027 COMPLETE CBC AUTOMATED: CPT

## 2021-11-26 PROCEDURE — 74011250637 HC RX REV CODE- 250/637: Performed by: OBSTETRICS & GYNECOLOGY

## 2021-11-26 PROCEDURE — 85730 THROMBOPLASTIN TIME PARTIAL: CPT

## 2021-11-26 PROCEDURE — 74011000250 HC RX REV CODE- 250: Performed by: FAMILY MEDICINE

## 2021-11-26 PROCEDURE — 85610 PROTHROMBIN TIME: CPT

## 2021-11-26 PROCEDURE — 80053 COMPREHEN METABOLIC PANEL: CPT

## 2021-11-26 PROCEDURE — 74011250636 HC RX REV CODE- 250/636: Performed by: FAMILY MEDICINE

## 2021-11-26 PROCEDURE — 74011250637 HC RX REV CODE- 250/637: Performed by: ADVANCED PRACTICE MIDWIFE

## 2021-11-26 PROCEDURE — 82962 GLUCOSE BLOOD TEST: CPT

## 2021-11-26 PROCEDURE — 65270000029 HC RM PRIVATE

## 2021-11-26 PROCEDURE — 74011000258 HC RX REV CODE- 258: Performed by: HOSPITALIST

## 2021-11-26 PROCEDURE — 76830 TRANSVAGINAL US NON-OB: CPT

## 2021-11-26 PROCEDURE — 85025 COMPLETE CBC W/AUTO DIFF WBC: CPT

## 2021-11-26 PROCEDURE — 74011250637 HC RX REV CODE- 250/637: Performed by: FAMILY MEDICINE

## 2021-11-26 RX ORDER — ACETAMINOPHEN 325 MG/1
650 TABLET ORAL EVERY 6 HOURS
Status: DISCONTINUED | OUTPATIENT
Start: 2021-11-26 | End: 2021-11-29 | Stop reason: HOSPADM

## 2021-11-26 RX ORDER — POTASSIUM CHLORIDE 750 MG/1
10 TABLET, EXTENDED RELEASE ORAL ONCE
Status: COMPLETED | OUTPATIENT
Start: 2021-11-26 | End: 2021-11-26

## 2021-11-26 RX ORDER — VANCOMYCIN/0.9 % SOD CHLORIDE 1.5G/250ML
1500 PLASTIC BAG, INJECTION (ML) INTRAVENOUS ONCE
Status: COMPLETED | OUTPATIENT
Start: 2021-11-26 | End: 2021-11-26

## 2021-11-26 RX ORDER — VANCOMYCIN HYDROCHLORIDE
1250 EVERY 12 HOURS
Status: DISCONTINUED | OUTPATIENT
Start: 2021-11-27 | End: 2021-11-28

## 2021-11-26 RX ADMIN — Medication 10 ML: at 06:00

## 2021-11-26 RX ADMIN — ACETAMINOPHEN 650 MG: 325 TABLET ORAL at 20:20

## 2021-11-26 RX ADMIN — ACETAMINOPHEN 650 MG: 325 TABLET ORAL at 05:32

## 2021-11-26 RX ADMIN — FAMOTIDINE 20 MG: 10 INJECTION, SOLUTION INTRAVENOUS at 20:20

## 2021-11-26 RX ADMIN — PIPERACILLIN AND TAZOBACTAM 3.38 G: 3; .375 INJECTION, POWDER, LYOPHILIZED, FOR SOLUTION INTRAVENOUS at 20:21

## 2021-11-26 RX ADMIN — METHYLERGONOVINE 200 MCG: 0.2 TABLET ORAL at 05:32

## 2021-11-26 RX ADMIN — ACETAMINOPHEN 650 MG: 325 TABLET ORAL at 13:57

## 2021-11-26 RX ADMIN — FAMOTIDINE 20 MG: 10 INJECTION, SOLUTION INTRAVENOUS at 10:37

## 2021-11-26 RX ADMIN — PIPERACILLIN AND TAZOBACTAM 3.38 G: 3; .375 INJECTION, POWDER, LYOPHILIZED, FOR SOLUTION INTRAVENOUS at 05:32

## 2021-11-26 RX ADMIN — PIPERACILLIN AND TAZOBACTAM 3.38 G: 3; .375 INJECTION, POWDER, LYOPHILIZED, FOR SOLUTION INTRAVENOUS at 14:35

## 2021-11-26 RX ADMIN — VANCOMYCIN HYDROCHLORIDE 1500 MG: 10 INJECTION, POWDER, LYOPHILIZED, FOR SOLUTION INTRAVENOUS at 12:17

## 2021-11-26 RX ADMIN — METHYLERGONOVINE 200 MCG: 0.2 TABLET ORAL at 17:59

## 2021-11-26 RX ADMIN — POTASSIUM CHLORIDE 10 MEQ: 750 TABLET, EXTENDED RELEASE ORAL at 05:32

## 2021-11-26 RX ADMIN — METHYLERGONOVINE 200 MCG: 0.2 TABLET ORAL at 12:08

## 2021-11-26 NOTE — PROGRESS NOTES
Chart reviewed, if home iv abx therapy needed please contact cm prior to discharge, at this time pt on zosyn and vanco, cultures still pending at this time to r/o MRSA, cm will cont to review and remain available, weekend cm can be contacted thru [facility]  for immediate needs.

## 2021-11-26 NOTE — PROGRESS NOTES
[facility]  Pulmonary, Critical Care, and Sleep Medicine    Name: Josie John MRN: [id number]   : 1995 Hospital: [facility] [place]    Date: 2021  Room: [address] [place] [address] Note                                              Consult requesting physician: Suzy Lazar    Reason for Consult: hemorrhage post partum, anemia, sepsis       IMPRESSION:   · Postpartum hemorrhage  · Pleural effusions  · urosepsis  ·   Patient Active Problem List   Diagnosis Code    UTI (urinary tract infection) N39.0    Anemia D64.9    Leukocytosis D72.829    Hypokalemia E87.6    At risk for breastfeeding difficulty Z91.89    Pulmonary edema J81.1    Postpartum hemorrhage O72.1    Shock ([address] R57.9    Sepsis ([address] A41.9    Pleural effusion, bilateral J90    Postoperative complication X95. 9XXA    Fever R50.9    Wound infection T14. 8XXA, L08.9       · Code status: Full code       RECOMMENDATIONS:   Respiratory: respiratory status stable  Since admission hypoxemic respiratory failure pulmonary edema fluid overload. Patient was recently discharged home on Lasix. Today she is on room air. Slightly decreased breath sounds at the bases. Chest x-ray stable. CXR 2021  IMPRESSIOn  No active cardiopulmonary disease. IMPRESSION CT chest abdomen and pelvic from 10/22/2021 personally reviwed     1. Prominent enhancing uterine and parametrial vessels, with gas in the enlarged  uterus. This is nonspecific and the postpartum/post  state, and can be  normal. CT cannot exclude endometritis if clinically suspected. Contains gas in  the anterior abdominal wall superficial to the musculature, additionally  nonspecific and can be postoperative related to phlegmon/infection. No  circumscribed, drainable collections are seen.     2. Bilateral pleural effusions and septal interstitial thickening, which can be  seen in setting of fluid overload/pulmonary edema.  Enlarged IVC additionally  which can be seen in the setting of overhydration. Bibasilar dependent  atelectasis or consolidation.     3. Intraperitoneal free fluid, and fluid about the distended gallbladder,  without calcified gallstones. This is nonspecific and may be related to recent  postoperative state If clinical findings suggest cholecystitis, then dedicated  gallbladder ultrasound could best further assess.     4. Mild bladder wall thickening, which can be seen in the setting of  underdistention or superimposed infection/cystitis, best correlated with  clinical and laboratory/urinalysis findings. No acute issues. Protecting airway.   `  Keep SPO2 >=92%. HOB 30 degree elevation all the time. Aggressive pulmonary toileting. Aspiration precautions. Incentive spirometry. CVS: Initially tachycardic and hypotensive now resolved after blood transfusion. Related to vaginal bleeding. Additionally has elevated white blood cells and UTI rule out sepsis. Recent echo performed on 10/22 normal  · LV: Estimated LVEF is 50 - 55%. Normal cavity size, wall thickness and diastolic function. Low normal systolic function. E/E'= 9.64.  · MV: Moderate mitral valve regurgitation is present. · TV: Mild to moderate tricuspid valve regurgitation is present. · PV: Mild pulmonic valve regurgitation is present. · PA: Pulmonary arterial systolic pressure is 34 mmHg. ID: overnight still fever and wbc high  Wound culure pending add van adjust aftre culure  Blood culure enagtive  Skin ? Wound infection ? Spoke to obgyn is draining serosanginous    Fever white blood cells elevated. COVID-19 test negative. UTI. Panculture. Change Zosyn to ceftriaxone to reduce amount of normal saline and should have  good coverage for gram-negative rods. Culture pending. Sepsis bundle and protocol followed. Follow serial lactic acid, frequent BMP check, fluid resuscitation. Follow cultures. Deescalate antibiotic when appropriate.   Thick acid pending. Hypotension resolved. Hematology/Oncology: Cytotec and Methergine to reduce vaginal bleeding  Improved  Hb stable at 8.1  Symptomatic acute vaginal bleeding hemoglobin 8.0. Platelets normal.  Slightly elevated INR. Received 2 units of blood. Mild coagulopathy. Most likely related to recent transfusions given 2 units of FFP. Renal: Normal  GI/: Normal GI prophylaxis  Endocrine: Monitor BS. SSI. Neurology: [facility] alert not in distress  Toxicology: None  Pain/Sedation: RN  Skin/Wound: Post  wound clear no issues  Electrolytes: Replace electrolytes per ICU electrolyte replacement protocol. Has abnormal electrolyte replacement started  IVF: Vent 2 units PRBC and FFP no need for normal saline  Nutrition: Blood diet  Prophylaxis: DVT Prophylaxis: SCD/ GI Prophylaxis: Protonix/  Restraints:   Wrist soft restraints for patient interfering with medical therapy/management and patient safety. Lines/Tubes: PIV    Other:  PT/OT eval and treat. OOB. Advance Directive/Palliative Care: consulted    Will defer respective systems problem management to primary and other respective consultant and follow patient in ICU with primary and other medical team.  Further recommendations will be based on the patient's response to recommended treatment and results of the investigation ordered. Quality Care: PPI, DVT prophylaxis, HOB elevated, Infection control all reviewed and addressed. Care of plan d/w hospitalist team, RN, RT, MDR.  D/w patient  (answered all questions to satisfaction). High complexity decision making was performed during the evaluation of this patient at high risk for decompensation with multiple organ involvement. Total critical care time spent rendering care exclusive of procedures/family discussion/coordination of care: 38 minutes.          Subjective/History of Present Illness:     Patient is a 27 y.o. female with PMHx significant for recent  11 days ago, recent admission to [facility] on 11/21 with anemia, pulmonary edema, UTI received transfusions antibiotics for UTI and diuretics. Patient was discharged home. On 11/25/2020 1 in the morning she got up after feeding a baby had large amount of vaginal bleed continued to have bleeding, some dizziness. On arrival to emergency room was tachycardic hypotensive tensive with mild hypoxemia. She received IV Pitocin, Cytotec, and 2 L of PRBC. Hemodynamically improved, previous CTA of the chest no pulmonary embolus. Patient started but did not finish her antibiotics for UTI she appears to still have urinary tract infection. OB/GYN admitted we are consulting. Patient admitted to ICU bed 5    11/26/2021:  Admitted to ICU bed 5. Vaginal bleeding resolved  Still fever but no sepsis  Transfer to floor  Would culure taken by ob/gyn attending in the meantime add vancomycin  If no MRSA please stop  respiratory status stable  I will sign off please call me if any pulmonary/critical care issues     I/O last 24 hrs: Intake/Output Summary (Last 24 hours) at 11/26/2021 1054  Last data filed at 11/26/2021 0532  Gross per 24 hour   Intake 1660 ml   Output 1450 ml   Net 210 ml         History taken from patient and EMR    Review of Systems:  A comprehensive review of systems was negative except for that written in the HPI. Review of Systems:   HEENT: No epistaxis, no nasal drainage, no difficulty in swallowing, no redness in eyes  Respiratory: as above  Cardiovascular: no chest pain, no palpitations, no chronic leg edema, no syncope  Gastrointestinal: no abd pain, no vomiting, no diarrhea, no bleeding symptoms  Genitourinary: vaginal bleeding, UTI  Musculoskeletal: Neg  Neurological: No focal weakness, no seizures, no headaches  Behvioral/Psych: No anxiety, no depression  General : No fever, no chills, no weight loss, no night sweats     No Known Allergies   History reviewed. No pertinent past medical history. Past Surgical History:   Procedure Laterality Date    HX  SECTION      HX  SECTION        Social History     Tobacco Use    Smoking status: Never Smoker    Smokeless tobacco: Not on file   Substance Use Topics    Alcohol use: Not Currently      History reviewed. No pertinent family history. Prior to Admission medications    Medication Sig Start Date End Date Taking? Authorizing Provider   ferrous sulfate 325 mg (65 mg iron) tablet Take 1 Tablet by mouth two (2) times daily (with meals). 21   Marsha Farley MD   furosemide (Lasix) 20 mg tablet Take 1 Tablet by mouth daily for 5 days. 21  Marsha Farley MD   ibuprofen (MOTRIN) 800 mg tablet Take 1 Tablet by mouth every eight (8) hours as needed for Pain. Indications: pain 21   Marsha Farley MD   amoxicillin (AMOXIL) 250 mg capsule Take 1 Capsule by mouth three (3) times daily for 5 days. 21  Marsha Farley MD   cephALEXin (Keflex) 500 mg capsule Take 1 Capsule by mouth four (4) times daily for 10 days. 21  Sofie CALVIN MD   potassium chloride (K-DUR, KLOR-CON M20) 20 mEq tablet Take 1 Tablet by mouth three (3) times daily for 3 days.  21  Connor Rinne, MD     Current Facility-Administered Medications   Medication Dose Route Frequency    sodium chloride (NS) flush 5-40 mL  5-40 mL IntraVENous Q8H    famotidine (PF) (PEPCID) 20 mg in 0.9% sodium chloride 10 mL injection  20 mg IntraVENous BID    methylergonovine (METHERGINE) tablet 200 mcg  200 mcg Oral Q6H    sodium chloride (NS) flush 5-40 mL  5-40 mL IntraVENous Q8H    piperacillin-tazobactam (ZOSYN) 3.375 g in 0.9% sodium chloride (MBP/ADV) 100 mL MBP  3.375 g IntraVENous Q8H         Objective:   Vital Signs:    Visit Vitals  /77   Pulse 92   Temp 98.3 °F (36.8 °C)   Resp 21   Ht 5' 4\" (1.626 m)   Wt 74.8 kg (165 lb)   SpO2 98%   BMI 28.32 kg/m²       O2 Device: None (Room air)       Temp (24hrs), Av.9 °F (37.2 °C), Min:97.7 °F (36.5 °C), Max:100.7 °F (38.2 °C)       Intake/Output:   Last shift:      No intake/output data recorded. Last 3 shifts: 11/24 1901 - 11/26 0700  In: 3070 [P.O.:1050; I.V.:1710]  Out: 1750 [Urine:1750]      Intake/Output Summary (Last 24 hours) at 11/26/2021 1054  Last data filed at 11/26/2021 0532  Gross per 24 hour   Intake 1660 ml   Output 1450 ml   Net 210 ml       Last 3 Recorded Weights in this Encounter    11/25/21 0520   Weight: 74.8 kg (165 lb)             No results for input(s): PHI, PHI, POC2, PCO2I, PO2, PO2I, HCO3, HCO3I, FIO2, FIO2I in the last 72 hours. Physical Exam:     [facility] general : Alert, Awake, NAD  Head:   Normocephalic,atraumatic. ENT:   EOM  no scleral icterus, no pallor, no cyanosis. Nose:   No sinus tenderness  Throat:  Oropharynx ,mucosa, and tongue normal. No oral thrush. Neck:   Supple, symmetric. Lymph nodes. Trachea is midline  Lung: Moderate air entry bilateral equal at the bases decreased breath sounds No rales. No rhonchi. No wheezing. No stridors. No prolongded expiration. No accessory muscle use. Heart:   Regular  rate & rhythm. S1 S2 present. No murmur. No JVD. Abdomen:  Soft. NT. ND. +BS. Post op wounds normla no bleeding or discharge  No masses. liver  and spleen  Extremities:  No pedal edema. No cyanosis. No clubbing. Pulses: 2+ and symmetric in DP. Capillary refill: normal  Lymphatic:  neck and supraclavicular    Musculoskeletal: No joint swelling. No tenderness. Skin:   Lesion Color, texture, turgor normal. No rashes or lesions.        Data:       Recent Results (from the past 24 hour(s))   CBC WITH AUTOMATED DIFF    Collection Time: 11/25/21 12:49 PM   Result Value Ref Range    WBC 22.8 (H) 4.6 - 13.2 K/uL    RBC 2.82 (L) 4.20 - 5.30 M/uL    HGB 8.4 (L) 12.0 - 16.0 g/dL    HCT 25.3 (L) 35.0 - 45.0 %    MCV 89.7 78.0 - 100.0 FL    MCH 29.8 24.0 - 34.0 PG    MCHC 33.2 31.0 - 37.0 g/dL    RDW 14.9 (H) 11.6 - 14.5 %    PLATELET 157 430 - 732 K/uL    MPV 8.7 (L) 9.2 - 11.8 FL    NRBC 0.1 (H) 0  WBC    ABSOLUTE NRBC 0.02 (H) 0.00 - 0.01 K/uL    NEUTROPHILS 78 (H) 40 - 73 %    BAND NEUTROPHILS 3 %    LYMPHOCYTES 9 (L) 21 - 52 %    MONOCYTES 6 3 - 10 %    EOSINOPHILS 0 0 - 5 %    BASOPHILS 0 0 - 2 %    IMMATURE GRANULOCYTES 4 (H) 0.0 - 0.5 %    ABS. NEUTROPHILS 18.4 (H) 1.8 - 8.0 K/UL    ABS. LYMPHOCYTES 2.1 0.9 - 3.6 K/UL    ABS. MONOCYTES 1.4 (H) 0.05 - 1.2 K/UL    ABS. EOSINOPHILS 0.0 0.0 - 0.4 K/UL    ABS. BASOPHILS 0.0 0.0 - 0.1 K/UL    ABS. IMM. GRANS. 0.9 (H) 0.00 - 0.04 K/UL    DF MANUAL      RBC COMMENTS NORMOCYTIC, NORMOCHROMIC     PROTHROMBIN TIME + INR    Collection Time: 11/25/21 12:49 PM   Result Value Ref Range    Prothrombin time 15.5 (H) 11.5 - 15.2 sec    INR 1.3 (H) 0.8 - 1.2     URINALYSIS W/MICROSCOPIC    Collection Time: 11/25/21  2:00 PM   Result Value Ref Range    Color ORANGE      Appearance HAZY      Specific gravity 1.008 1.003 - 1.030      pH (UA) 7.0 5.0 - 8.0      Protein Negative NEG mg/dL    Glucose Negative NEG mg/dL    Ketone Negative NEG mg/dL    Bilirubin Negative NEG      Blood LARGE (A) NEG      Urobilinogen 0.2 0.2 - 1.0 EU/dL    Nitrites Negative NEG      Leukocyte Esterase MODERATE (A) NEG      WBC 21 to 35 0 - 5 /hpf    RBC TOO NUMEROUS TO COUNT 0 - 5 /hpf    Epithelial cells 1+ 0 - 5 /lpf    Bacteria FEW (A) NEG /hpf   CULTURE, WOUND W GRAM STAIN    Collection Time: 11/25/21  5:20 PM    Specimen: Incision;  Wound   Result Value Ref Range    Special Requests: NO SPECIAL REQUESTS      GRAM STAIN NO WBCS SEEN     GRAM STAIN NO ORGANISMS SEEN      Culture result: PENDING    CBC W/O DIFF    Collection Time: 11/25/21  7:25 PM   Result Value Ref Range    WBC 23.2 (H) 4.6 - 13.2 K/uL    RBC 2.94 (L) 4.20 - 5.30 M/uL    HGB 8.6 (L) 12.0 - 16.0 g/dL    HCT 26.4 (L) 35.0 - 45.0 %    MCV 89.8 78.0 - 100.0 FL    MCH 29.3 24.0 - 34.0 PG    MCHC 32.6 31.0 - 37.0 g/dL    RDW 15.3 (H) 11.6 - 14.5 %    PLATELET 811 353 - 602 K/uL    MPV 8.8 (L) 9.2 - 11.8 FL    NRBC 0.1 (H) 0  WBC    ABSOLUTE NRBC 0.03 (H) 0.00 - 0.01 K/uL   POTASSIUM    Collection Time: 11/25/21  7:25 PM   Result Value Ref Range    Potassium 3.7 3.5 - 5.5 mmol/L   MAGNESIUM    Collection Time: 11/25/21  7:25 PM   Result Value Ref Range    Magnesium 2.2 1.6 - 2.6 mg/dL   CALCIUM, IONIZED    Collection Time: 11/25/21  7:25 PM   Result Value Ref Range    Ionized Calcium 1.11 (L) 1.12 - 8.78 MMOL/L   METABOLIC PANEL, COMPREHENSIVE    Collection Time: 11/26/21  3:38 AM   Result Value Ref Range    Sodium 142 136 - 145 mmol/L    Potassium 3.9 3.5 - 5.5 mmol/L    Chloride 111 100 - 111 mmol/L    CO2 24 21 - 32 mmol/L    Anion gap 7 3.0 - 18 mmol/L    Glucose 86 74 - 99 mg/dL    BUN 5 (L) 7.0 - 18 MG/DL    Creatinine 0.59 (L) 0.6 - 1.3 MG/DL    BUN/Creatinine ratio 8 (L) 12 - 20      GFR est AA >60 >60 ml/min/1.73m2    GFR est non-AA >60 >60 ml/min/1.73m2    Calcium 8.1 (L) 8.5 - 10.1 MG/DL    Bilirubin, total 0.4 0.2 - 1.0 MG/DL    ALT (SGPT) 16 13 - 56 U/L    AST (SGOT) 10 10 - 38 U/L    Alk.  phosphatase 91 45 - 117 U/L    Protein, total 4.9 (L) 6.4 - 8.2 g/dL    Albumin 1.8 (L) 3.4 - 5.0 g/dL    Globulin 3.1 2.0 - 4.0 g/dL    A-G Ratio 0.6 (L) 0.8 - 1.7     PROTHROMBIN TIME + INR    Collection Time: 11/26/21  3:38 AM   Result Value Ref Range    Prothrombin time 15.6 (H) 11.5 - 15.2 sec    INR 1.3 (H) 0.8 - 1.2     CBC WITH AUTOMATED DIFF    Collection Time: 11/26/21  3:38 AM   Result Value Ref Range    WBC 19.2 (H) 4.6 - 13.2 K/uL    RBC 2.79 (L) 4.20 - 5.30 M/uL    HGB 8.1 (L) 12.0 - 16.0 g/dL    HCT 25.2 (L) 35.0 - 45.0 %    MCV 90.3 78.0 - 100.0 FL    MCH 29.0 24.0 - 34.0 PG    MCHC 32.1 31.0 - 37.0 g/dL    RDW 15.4 (H) 11.6 - 14.5 %    PLATELET 113 735 - 036 K/uL    MPV 8.7 (L) 9.2 - 11.8 FL    NRBC 0.0 0  WBC    ABSOLUTE NRBC 0.00 0.00 - 0.01 K/uL    NEUTROPHILS 77 (H) 40 - 73 %    LYMPHOCYTES 11 (L) 21 - 52 %    MONOCYTES 6 3 - 10 %    EOSINOPHILS 0 0 - 5 %    BASOPHILS 0 0 - 2 %    IMMATURE GRANULOCYTES 6 (H) 0.0 - 0.5 %    ABS. NEUTROPHILS 14.8 (H) 1.8 - 8.0 K/UL    ABS. LYMPHOCYTES 2.0 0.9 - 3.6 K/UL    ABS. MONOCYTES 1.1 0.05 - 1.2 K/UL    ABS. EOSINOPHILS 0.0 0.0 - 0.4 K/UL    ABS. BASOPHILS 0.1 0.0 - 0.1 K/UL    ABS. IMM. GRANS. 1.2 (H) 0.00 - 0.04 K/UL    DF AUTOMATED     PTT    Collection Time: 11/26/21  3:38 AM   Result Value Ref Range    aPTT 33.8 23.0 - 36.4 SEC   GLUCOSE, POC    Collection Time: 11/26/21  5:17 AM   Result Value Ref Range    Glucose (POC) 91 70 - 110 mg/dL         Chemistry Recent Labs     11/26/21  0338 11/25/21  1925 11/25/21  0523   GLU 86  --  116*     --  142   K 3.9 3.7 3.4*     --  110   CO2 24  --  24   BUN 5*  --  12   CREA 0.59*  --  0.67   CA 8.1*  --  8.0*   MG  --  2.2 1.6   AGAP 7  --  8   BUCR 8*  --  18   AP 91  --  110   TP 4.9*  --  5.1*   ALB 1.8*  --  1.8*   GLOB 3.1  --  3.3   AGRAT 0.6*  --  0.5*        Lactic Acid No results found for: LAC  No results for input(s): LAC in the last 72 hours. Liver Enzymes Protein, total   Date Value Ref Range Status   11/26/2021 4.9 (L) 6.4 - 8.2 g/dL Final     Albumin   Date Value Ref Range Status   11/26/2021 1.8 (L) 3.4 - 5.0 g/dL Final     Globulin   Date Value Ref Range Status   11/26/2021 3.1 2.0 - 4.0 g/dL Final     A-G Ratio   Date Value Ref Range Status   11/26/2021 0.6 (L) 0.8 - 1.7   Final     Alk.  phosphatase   Date Value Ref Range Status   11/26/2021 91 45 - 117 U/L Final     Recent Labs     11/26/21 0338 11/25/21  0523   TP 4.9* 5.1*   ALB 1.8* 1.8*   GLOB 3.1 3.3   AGRAT 0.6* 0.5*   AP 91 110        CBC w/Diff Recent Labs     11/26/21 0338 11/25/21  1925 11/25/21  1249 11/25/21  0523 11/25/21  0523   WBC 19.2* 23.2* 22.8*   < > 17.8*   RBC 2.79* 2.94* 2.82*   < > 2.63*   HGB 8.1* 8.6* 8.4*   < > 8.0*   HCT 25.2* 26.4* 25.3*   < > 24.9*    369 310   < > 473*   GRANS 77*  --  78*  --  74*   LYMPH 11*  --  9*  --  14*   EOS 0  --  0  --  0    < > = values in this interval not displayed. Cardiac Enzymes No results found for: CPK, CK, CKMMB, CKMB, RCK3, CKMBT, CKNDX, CKND1, MARSHALL, TROPT, TROIQ, AIYANA, TROPT, TNIPOC, BNP, BNPP     BNP No results found for: BNP, BNPP, XBNPT     Coagulation Recent Labs     11/26/21  0338 11/25/21  1249 11/25/21  0523   PTP 15.6* 15.5* 15.3*   INR 1.3* 1.3* 1.3*   APTT 33.8  --   --          Thyroid  No results found for: T4, T3U, TSH, TSHEXT, TSHEXT    No results found for: T4     Urinalysis Lab Results   Component Value Date/Time    Color ORANGE 11/25/2021 02:00 PM    Appearance HAZY 11/25/2021 02:00 PM    Specific gravity 1.008 11/25/2021 02:00 PM    pH (UA) 7.0 11/25/2021 02:00 PM    Protein Negative 11/25/2021 02:00 PM    Glucose Negative 11/25/2021 02:00 PM    Ketone Negative 11/25/2021 02:00 PM    Bilirubin Negative 11/25/2021 02:00 PM    Urobilinogen 0.2 11/25/2021 02:00 PM    Nitrites Negative 11/25/2021 02:00 PM    Leukocyte Esterase MODERATE (A) 11/25/2021 02:00 PM    Epithelial cells 1+ 11/25/2021 02:00 PM    Bacteria FEW (A) 11/25/2021 02:00 PM    WBC 21 to 35 11/25/2021 02:00 PM    RBC TOO NUMEROUS TO COUNT 11/25/2021 02:00 PM        Micro  Recent Labs     11/25/21  1720 11/25/21  0605   CULT PENDING NO GROWTH 1 DAY     Recent Labs     11/25/21  1720 11/25/21  0605   CULT PENDING NO GROWTH 1 DAY          Culture data during this hospitalization.    All Micro Results     Procedure Component Value Units Date/Time    CULTURE, [place] [[id number]] Collected: 11/25/21 1720    Order Status: Completed Specimen: Wound from Incision Updated: 11/26/21 1023     Special Requests: NO SPECIAL REQUESTS        GRAM STAIN NO WBCS SEEN      NO ORGANISMS SEEN        Culture result: PENDING    CULTURE, URINE [[id number]] Collected: 11/25/21 1400    Order Status: Completed Specimen: Urine from Clean catch Updated: 11/26/21 0957    CULTURE, BLOOD [[id number]] Collected: 11/25/21 0605    Order Status: Completed Specimen: Blood Updated: 21     Special Requests: NO SPECIAL REQUESTS        Culture result: NO GROWTH 1 DAY       COVID-19 RAPID TEST [[id number]] Collected: 21 0910    Order Status: Completed Specimen: Nasopharyngeal Updated: 21 1031     Specimen source Nasopharyngeal        COVID-19 rapid test Not detected        Comment: Rapid Abbott ID Now       Rapid NAAT:  The specimen is NEGATIVE for SARS-CoV-2, the novel coronavirus associated with COVID-19. Negative results should be treated as presumptive and, if inconsistent with clinical signs and symptoms or necessary for patient management, should be tested with an alternative molecular assay. Negative results do not preclude SARS-CoV-2 infection and should not be used as the sole basis for patient management decisions. This test has been authorized by the FDA under an Emergency Use Authorization (EUA) for use by authorized laboratories. Fact sheet for Healthcare Providers: Everplansdate.co.nz  Fact sheet for Patients: Everplansdate.co.nz       Methodology: Isothermal Nucleic Acid Amplification         CULTURE, URINE [[id number]] Collected: 21    Order Status: Canceled Specimen: Urine from Clean catch     CULTURE, BLOOD [[id number]] Collected: 21    Order Status: Canceled Specimen: Blood                PFT       Ultrasound       LE Doppler       ECHO       Images report reviewed by me:  CT (Most Recent) (CT chest reviewed by me) Results from [place] encounter on 21    CTA CHEST ABD PELV W WO CONT    Narrative  HISTORY:  -From Provider: postpartum fever/ dyspnea/ c section a  -Additional: One-week postop . Chills. Fever. Passing blood clots  vaginally this exam.    Technique: Axial imaging was obtained dynamically through the pre-and post  contrast aorta using high-resolution CT angiographic protocol, without  complications.   In addition, coronal and sagittal reconstructed MIP arteriograms  were performed, to better evaluate the aorta, and to increase sensitivity for  detection of aortic dissection and aneurysm. Dose reduction techniques used: automated exposure control, adjustment of the  mAs and/or kVp according to patient size, and iterative reconstruction  techniques. Comparison study: .    Findings:  Cardiovascular: Aorta: Normal in caliber, without evidence of aneurysm or dissection]. Pulmonary arteries: opacified completely, without evidence of filling defects to  suggest pulmonary thromboembolism. Please note, the study was performed with  optimal contrast bolus timing to the aorta is not optimal for assessment of  pulmonary emboli. Coronary arteries: Unremarkable. Heart: The heart is not enlarged. There is a small pericardial effusion. . ]  Abdominal visceral arteries: Celiac, SMA, bilateral renal arteries, VALERIA without  significant origin disease/calcification. Iliac arteries: unremarkable. CT CHEST:    Pulmonary parenchyma: There is septal interstitial thickening. There is mild  respiratory motion degradation limiting evaluation of the lung parenchyma. Mild  right and minimal left dependent atelectasis/consolidation. Lymph nodes: There is no significant axillary, hilar, or mediastinal  lymphadenopathy. Pleura: Moderate right and small left pleural effusions. Chest wall/lower neck soft tissues:    CT ABDOMEN:    Liver : Unremarkable, without focal lesions. Vascular enhancement: Portal, splenic vein, SMV are unremarkable  Spleen: Unremarkable, without focal lesions. Adrenal glands: Unremarkable, without focal lesions. Pancreas: Unremarkable, without focal lesions. Stomach/duodenum: Unremarkable. Gallbladder: No calcified gallstones or surrounding inflammatory changes  identified. Biliary system: Unremarkable. Retroperitoneum:  Kidneys: No hydronephrosis or renal calculi. No focal lesions.   IVC: Enlarged, not opacified at phase of contrast obtained. .  Lymph nodes:  No significantly enlarged lymph nodes. Intraperitoneal free air:None seen. Intraperitoneal free fluid: Small amount of hypodense fluid about the  gallbladder, liver and in the cul-de-sac. No hyperdense hemorrhage. CT PELVIS:  Bowel : Unremarkable. Appendix: Normal as seen. No localized right lower quadrant or pericecal  inflammatory changes. Bladder: Mild bladder wall thickening 1in the underdistended state. [No bladder  stones are seen.]  Other:  Enlarged postpartum uterus, containing internal gas. Prominent enhancing  uterine and parametrial vessels. There is stranding of the anterior abdominal  wall fat. There are foci of cutaneous gas in the low pelvis superficial to the  anterior abdominal wall. There is hypodense thickening/edema about the  umbilicus. Osseous structures: Unremarkable ]    Impression  1. Prominent enhancing uterine and parametrial vessels, with gas in the enlarged  uterus. This is nonspecific and the postpartum/post  state, and can be  normal. CT cannot exclude endometritis if clinically suspected. Contains gas in  the anterior abdominal wall superficial to the musculature, additionally  nonspecific and can be postoperative related to phlegmon/infection. No  circumscribed, drainable collections are seen. 2. Bilateral pleural effusions and septal interstitial thickening, which can be  seen in setting of fluid overload/pulmonary edema. Enlarged IVC additionally  which can be seen in the setting of overhydration. Bibasilar dependent  atelectasis or consolidation. 3. Intraperitoneal free fluid, and fluid about the distended gallbladder,  without calcified gallstones. This is nonspecific and may be related to recent  postoperative state If clinical findings suggest cholecystitis, then dedicated  gallbladder ultrasound could best further assess.     4. Mild bladder wall thickening, which can be seen in the setting of  underdistention or superimposed infection/cystitis, best correlated with  clinical and laboratory/urinalysis findings. CXR reviewed by me:  XR (Most Recent). CXR  reviewed by me and compared with previous CXR Results from Hospital Encounter encounter on 11/25/21    XR CHEST PORT    Narrative  EXAM: CHEST RADIOGRAPH, SINGLE VIEW    CLINICAL INDICATION/HISTORY: hypoxia  <Additional:  None. COMPARISON: None. TECHNIQUE: Portable frontal view of the chest was obtained.    _______________    FINDINGS:    SUPPORT DEVICES: None. HEART AND MEDIASTINUM: Cardiomediastinal silhouette appears within normal  limits. LUNGS AND PLEURAL SPACES: Pulmonary vessels are normal.  Lungs are clear. Costophrenic angles are sharp. No pneumothorax. BONY THORAX AND SOFT TISSUES: No acute osseous abnormality. _______________    Impression  No active cardiopulmonary disease.            Karlos Roy MD  11/26/2021

## 2021-11-26 NOTE — PROGRESS NOTES
1146 TRANSFER - IN REPORT:    Verbal report received from Alisa Lemus RN(name) on [facility]  being received from ICU(unit) for change in patient condition(pt stable to transfer to [facility] from ICU per Jia Mary MD)      Report consisted of patients Situation, Background, Assessment and   Recommendations(SBAR). Information from the following report(s) SBAR, MAR and Recent Results was reviewed with the receiving nurse. Opportunity for questions and clarification was provided. Assessment completed upon patients arrival to unit and care assumed. 1150 Assessment complete upon assuming care, see flowsheets. Pt denies headache, visual disturbances, ringing in the ears, SOB, chest pain, and/or shooting pain in calves. Pt educated on previously stated signs and symptoms to report, plan of care for the day, proper malik care, pain management- pt verbalized understanding. Opportunity for questions offered, pt denies questions. This RN rounding Q2 hours. Needs and concerns addressed. Patient Vitals for the past 4 hrs:   Temp Pulse Resp BP SpO2   11/26/21 1150 97.6 °F (36.4 °C) 95 16 108/82 100 %       1208 Scheduled Methergine administered per order. 1217 Scheduled Vancomycin administered per order. 1357 Scheduled Tylenol administered per order. 1435 Scheduled Zosyn administered per order. 1759 Scheduled Methergine administered per order. 1910 Bedside and verbal shift change report given to aMcy Winston RN by Mekhi Whittington RN. Report given with use of SBAR, MAR, I/O, Recent Results. This RN relinquished care of pt at this time.

## 2021-11-26 NOTE — PROGRESS NOTES
Still fever Wbc add Vanc r/o MRSa  OB/gyn already did culture but might take 2-3 days to come back  Otherwise no bleeding hemodynamically stable  DONALD Petersen MD

## 2021-11-26 NOTE — PROGRESS NOTES
Hospitalist Progress Note-critical care note     Patient: Berto Arroyo MRN: [id number]  [facility]: [id number]    YOB: 1995  Age: 27 y.o. Sex: female    DOA: 11/25/2021 LOS:  LOS: 1 day            Chief complaint: wound infection, postpartum hemorrhage, uti     Assessment/Plan         Hospital Problems  Date Reviewed: 11/25/2021          Codes Class Noted POA    Wound infection ICD-10-CM: T14. 8XXA, L08.9  ICD-9-CM: 958.3  11/26/2021 Unknown        Postpartum hemorrhage ICD-10-CM: O72.1  ICD-9-CM: 666.10  11/25/2021 Yes        Shock ([address] ICD-10-CM: R57.9  ICD-9-CM: 785.50  11/25/2021 Yes        Sepsis ([address] ICD-10-CM: A41.9  ICD-9-CM: 038.9, 995.91  11/25/2021 Yes        Pleural effusion, bilateral ICD-10-CM: J90  ICD-9-CM: 511.9  11/25/2021 Yes        Postoperative complication HXS-17-MU: X40. 9XXA  ICD-9-CM: 998.9  11/25/2021 Yes        Fever ICD-10-CM: R50.9  ICD-9-CM: 780.60  11/25/2021 Yes        UTI (urinary tract infection) ICD-10-CM: N39.0  ICD-9-CM: 599.0  11/22/2021 Yes               Shock resolved  Received blood transfusion   Leukocytosis improving -down to 19 K  So far bcx still negative     Postpartum hemorrhage   Improving, some small amount blood clots   On methergen per primary team     Pleural effusion   Echo done recently and cardiologist saw pt   So far no sob reported   Will give lasix as needed     uti   On zosyn     wound infection postoperative complication   Continue zosyn -broad coverage  Wound cx sent still pending   Continue local wound care     Subjective : feverish, no sob, feel better, still having blood clots     Rn:stable   Disposition :tbd,   Review of systems:    General: No fevers or chills. Cardiovascular: No chest pain or pressure. No palpitations. Pulmonary: No shortness of breath. Gastrointestinal: No nausea, vomiting.      Vital signs/Intake and Output:  Visit Vitals  /77   Pulse 92   Temp 98.9 °F (37.2 °C)   Resp 21   Ht 5' 4\" (1.626 m)   Wt 74.8 kg (165 lb)   SpO2 98%   BMI 28.32 kg/m²     Current Shift:  No intake/output data recorded. Last three shifts:  11/24 1901 - 11/26 0700  In: 3070 [P.O.:1050; I.V.:1710]  Out: 1750 [Urine:1750]    Physical Exam:  General: WD, WN. Alert, cooperative, no acute distress    HEENT: NC, Atraumatic. PERRLA, anicteric sclerae. Lungs: CTA Bilaterally. No Wheezing/Rhonchi/Rales. Heart:  Regular  rhythm,  No murmur, No Rubs, No Gallops  Abdomen: Soft, Non distended, Non tender. +Bowel sounds, wound healing-whitish drain noted from rt side of surgical site, no erythema of skin  Extremities: No c/c/e  Psych:   Not anxious or agitated. Neurologic:  No acute neurological deficit. Labs: Results:       Chemistry Recent Labs     11/26/21 0338 11/25/21 1925 11/25/21 0523   GLU 86  --  116*     --  142   K 3.9 3.7 3.4*     --  110   CO2 24  --  24   BUN 5*  --  12   CREA 0.59*  --  0.67   CA 8.1*  --  8.0*   AGAP 7  --  8   BUCR 8*  --  18   AP 91  --  110   TP 4.9*  --  5.1*   ALB 1.8*  --  1.8*   GLOB 3.1  --  3.3   AGRAT 0.6*  --  0.5*      CBC w/Diff Recent Labs     11/26/21 0338 11/25/21 1925 11/25/21  1249 11/25/21  0523 11/25/21  0523   WBC 19.2* 23.2* 22.8*   < > 17.8*   RBC 2.79* 2.94* 2.82*   < > 2.63*   HGB 8.1* 8.6* 8.4*   < > 8.0*   HCT 25.2* 26.4* 25.3*   < > 24.9*    369 310   < > 473*   GRANS 77*  --  78*  --  74*   LYMPH 11*  --  9*  --  14*   EOS 0  --  0  --  0    < > = values in this interval not displayed. Cardiac Enzymes No results for input(s): CPK, CKND1, MARSHALL in the last 72 hours. No lab exists for component: CKRMB, TROIP   Coagulation Recent Labs     11/26/21  0338 11/25/21  1249   PTP 15.6* 15.5*   INR 1.3* 1.3*   APTT 33.8  --        Lipid Panel No results found for: CHOL, CHOLPOCT, CHOLX, CHLST, CHOLV, 568840, HDL, HDLP, LDL, LDLC, DLDLP, 424724, VLDLC, VLDL, TGLX, TRIGL, TRIGP, TGLPOCT, CHHD, CHHDX   BNP No results for input(s): BNPP in the last 72 hours. Liver Enzymes Recent Labs     21  0338   TP 4.9*   ALB 1.8*   AP 91      Thyroid Studies No results found for: T4, T3U, TSH, TSHEXT     Procedures/imaging: see electronic medical records for all procedures/Xrays and details which were not copied into this note but were reviewed prior to creation of Plan    CTA CHEST ABD PELV W WO CONT    Result Date: 2021  HISTORY: -From Provider: postpartum fever/ dyspnea/ c section a -Additional: One-week postop . Chills. Fever. Passing blood clots vaginally this exam. Technique: Axial imaging was obtained dynamically through the pre-and post contrast aorta using high-resolution CT angiographic protocol, without complications. In addition, coronal and sagittal reconstructed MIP arteriograms were performed, to better evaluate the aorta, and to increase sensitivity for detection of aortic dissection and aneurysm. Dose reduction techniques used: automated exposure control, adjustment of the mAs and/or kVp according to patient size, and iterative reconstruction techniques. Comparison study: . Findings: Cardiovascular: Aorta: Normal in caliber, without evidence of aneurysm or dissection]. Pulmonary arteries: opacified completely, without evidence of filling defects to suggest pulmonary thromboembolism. Please note, the study was performed with optimal contrast bolus timing to the aorta is not optimal for assessment of pulmonary emboli. Coronary arteries: Unremarkable. Heart: The heart is not enlarged. There is a small pericardial effusion. . ] Abdominal visceral arteries: Celiac, SMA, bilateral renal arteries, VALERIA without significant origin disease/calcification. Iliac arteries: unremarkable. CT CHEST: Pulmonary parenchyma: There is septal interstitial thickening. There is mild respiratory motion degradation limiting evaluation of the lung parenchyma. Mild right and minimal left dependent atelectasis/consolidation. Lymph nodes:  There is no significant axillary, hilar, or mediastinal lymphadenopathy. Pleura: Moderate right and small left pleural effusions. Chest wall/lower neck soft tissues: CT ABDOMEN: Liver : Unremarkable, without focal lesions. Vascular enhancement: Portal, splenic vein, SMV are unremarkable Spleen: Unremarkable, without focal lesions. Adrenal glands: Unremarkable, without focal lesions. Pancreas: Unremarkable, without focal lesions. Stomach/duodenum: Unremarkable. Gallbladder: No calcified gallstones or surrounding inflammatory changes identified. Biliary system: Unremarkable. Retroperitoneum: Kidneys: No hydronephrosis or renal calculi. No focal lesions. IVC: Enlarged, not opacified at phase of contrast obtained. . Lymph nodes:  No significantly enlarged lymph nodes. Intraperitoneal free air:None seen. Intraperitoneal free fluid: Small amount of hypodense fluid about the gallbladder, liver and in the cul-de-sac. No hyperdense hemorrhage. CT PELVIS: Bowel : Unremarkable. Appendix: Normal as seen. No localized right lower quadrant or pericecal inflammatory changes. Bladder: Mild bladder wall thickening 1in the underdistended state. [No bladder stones are seen.] Other:  Enlarged postpartum uterus, containing internal gas. Prominent enhancing uterine and parametrial vessels. There is stranding of the anterior abdominal wall fat. There are foci of cutaneous gas in the low pelvis superficial to the anterior abdominal wall. There is hypodense thickening/edema about the umbilicus. Osseous structures: Unremarkable ]     1. Prominent enhancing uterine and parametrial vessels, with gas in the enlarged uterus. This is nonspecific and the postpartum/post  state, and can be normal. CT cannot exclude endometritis if clinically suspected. Contains gas in the anterior abdominal wall superficial to the musculature, additionally nonspecific and can be postoperative related to phlegmon/infection. No circumscribed, drainable collections are seen.  2. Bilateral pleural effusions and septal interstitial thickening, which can be seen in setting of fluid overload/pulmonary edema. Enlarged IVC additionally which can be seen in the setting of overhydration. Bibasilar dependent atelectasis or consolidation. 3. Intraperitoneal free fluid, and fluid about the distended gallbladder, without calcified gallstones. This is nonspecific and may be related to recent postoperative state If clinical findings suggest cholecystitis, then dedicated gallbladder ultrasound could best further assess. 4. Mild bladder wall thickening, which can be seen in the setting of underdistention or superimposed infection/cystitis, best correlated with clinical and laboratory/urinalysis findings. US ABD LTD    Result Date: 11/23/2021  EXAM: Limited right upper quadrant abdominal ultrasound INDICATION: Right upper quadrant pain. COMPARISON: CT one day prior. TECHNIQUE: Real-time abdomen/right upper quadrant sonography in multiple planes was performed with image documentation. Grayscale, color flow Doppler imaging, and velocity spectral waveform analysis of the portal vein was performed (duplex imaging). _______________ FINDINGS: LIVER: Normal in echotexture. No focal mass Color flow Doppler and velocity spectral waveform analysis of the portal vein shows normal (hepatopetal) direction of flow. Milton Nava BILIARY SYSTEM: No intrahepatic biliary dilatation. Common bile duct is normal in caliber measuring 0.6 cm. GALLBLADDER: No gallstones or gallbladder wall thickening. No pericholecystic fluid. Small polyp measuring 2 mm. RIGHT KIDNEY: 10.5 cm in length. No hydronephrosis or renal mass. No visible calculi. PANCREAS: Head and body are unremarkable in appearance though the tail is obscured by overlying bowel gas. IVC: Visualized portions are unremarkable in appearance. OTHER: Trace perihepatic fluid. Small right effusion. _______________     No gallstones or secondary findings of cholecystitis.  Trace perihepatic fluid. Small right effusion. XR CHEST PORT    Result Date: 11/25/2021  EXAM: CHEST RADIOGRAPH, SINGLE VIEW CLINICAL INDICATION/HISTORY: hypoxia      <Additional:  None. COMPARISON: None. TECHNIQUE: Portable frontal view of the chest was obtained. _______________ FINDINGS: SUPPORT DEVICES: None. HEART AND MEDIASTINUM: Cardiomediastinal silhouette appears within normal limits. LUNGS AND PLEURAL SPACES: Pulmonary vessels are normal.  Lungs are clear. Costophrenic angles are sharp. No pneumothorax. BONY THORAX AND SOFT TISSUES: No acute osseous abnormality. _______________     No active cardiopulmonary disease. ECHO ADULT COMPLETE    Result Date: 11/22/2021  · LV: Estimated LVEF is 50 - 55%. Normal cavity size, wall thickness and diastolic function. Low normal systolic function. E/E'= 9.64. · MV: Moderate mitral valve regurgitation is present. · TV: Mild to moderate tricuspid valve regurgitation is present. · PV: Mild pulmonic valve regurgitation is present. · PA: Pulmonary arterial systolic pressure is 34 mmHg. DUPLEX LOWER EXT VENOUS BILAT    Result Date: 11/22/2021  · No evidence of deep vein thrombosis in the right lower extremity. · No evidence of deep vein thrombosis in the left lower extremity.         Melvin Keenan MD

## 2021-11-26 NOTE — PROGRESS NOTES
[address] [facility] Pharmacokinetic Monitoring Service - Vancomycin     Josie John is a 27 y.o. female starting on vancomycin therapy for Skin and Soft Tissue Infection. Pharmacy consulted by Dr. Melissa Garcia for monitoring and adjustment. Target Concentration: Goal AUC/RANDOLPH 400-600 mg*hr/L    Additional Antimicrobials: Zosyn    Pertinent Laboratory Values: Wt Readings from Last 1 Encounters:   11/25/21 74.8 kg (165 lb)     Temp Readings from Last 1 Encounters:   11/26/21 97.6 °F (36.4 °C)     No components found for: PROCAL  Estimated Creatinine Clearance: 118.4 mL/min (A) (by C-G formula based on SCr of 0.59 mg/dL (L)). Recent Labs     11/26/21  1050 11/26/21  0338   WBC 19.4* 19.2*       Pertinent Cultures:  Culture Date Source Results   11/25/21 wound pending     Plan:  Dosing recommendations based on Bayesian software  Start with loading dose: Vancomycin 1500 mg IV once, administered 11/26/21 at 12:17.    Followed by maintenance dose: Vancomycin 1250 mg IV q12h    Anticipated AUC of 462 mg/L.hr and trough concentration of 13.2 mg/L at steady state  Renal labs as indicated   Pharmacy will continue to monitor patient and adjust therapy as indicated    Thank you for the consult,  DOMONIQUE Martinez  11/26/2021 4:17 PM

## 2021-11-26 NOTE — PROGRESS NOTES
Pharmacy Note: Zosyn    Pt was on extended infusion of Zosyn while in the intensive care unit. Prior order:  Zosyn 3.375 grams IV q8h (over 240 minutes). (7 days)  Now that patient has been transferred to , Zosyn has been adjusted to standard intermittent dosing. Dose adjusted to:  Zosyn 3.375 grams IV q6h (infuse over 30 minutes) x 6 days (24 doses remaining). Scr = 0.59   CrCl > 100  ml/min      Pharmacy to monitor daily.    Angely Arrington, PharmD   [phone]

## 2021-11-26 NOTE — PROGRESS NOTES
1600: Bedside shift change report received from Edwin Sumner Mona S. Report included the following information SBAR, Kardex, Intake/Output, MAR, Recent Results, Med Rec Status and Cardiac Rhythm NSR/ SINUS TACH and plan of care. Bedside shift change report given to MEGAN Ortiz RN. Report included the following information SBAR, Kardex, Intake/Output, MAR, Recent Results, Med Rec Status and Cardiac Rhythm NSR/Sinus Tach and plan of care.

## 2021-11-26 NOTE — PROGRESS NOTES
Post-Operative  Day 12 / HD 1    Karina Hsieh     Assessment:   Hospital Problems  Date Reviewed: 2021          Codes Class Noted POA    Wound infection ICD-10-CM: T14. 8XXA, L08.9  ICD-9-CM: 958.3  2021 Unknown        Postpartum hemorrhage ICD-10-CM: O72.1  ICD-9-CM: 666.10  2021 Yes        Shock ([address] ICD-10-CM: R57.9  ICD-9-CM: 785.50  2021 Yes        Sepsis ([facility] [address] ICD-10-CM: A41.9  ICD-9-CM: 038.9, 995.91  2021 Yes        Pleural effusion, bilateral ICD-10-CM: J90  ICD-9-CM: 511.9  2021 Yes        Postoperative complication GWK-50-KR: W52. 9XXA  ICD-9-CM: 998.9  2021 Yes        Fever ICD-10-CM: R50.9  ICD-9-CM: 780.60  2021 Yes        UTI (urinary tract infection) ICD-10-CM: N39.0  ICD-9-CM: 599.0  2021 Yes            Post-Op day 2, doing well    Plan:   PPH - Bleeding has improved. Minimal lochia. Continue methergine. Will need to pump and dump   Recommend transfer to the floor.    - Wound infection -Now serosanguinous fluid draining. Small superficial separation on the right lateral aspect of the incision. No erythema or warmth. Wound otherwise intact. Culture pending. Vancomycin added. - PP endometritis and UTI - continue antibiotics. Serial CBCs and monitor fever. Last temp at 8 AM    -  VTE prophylaxis - apply SCDs or ambulate.          Patient doing well without significant complaint. Doing well. Tolerating diet.   Normal lochia    Current Facility-Administered Medications   Medication Dose Route Frequency    sodium chloride (NS) flush 5-40 mL  5-40 mL IntraVENous Q8H    famotidine (PF) (PEPCID) 20 mg in 0.9% sodium chloride 10 mL injection  20 mg IntraVENous BID    methylergonovine (METHERGINE) tablet 200 mcg  200 mcg Oral Q6H    sodium chloride (NS) flush 5-40 mL  5-40 mL IntraVENous Q8H    piperacillin-tazobactam (ZOSYN) 3.375 g in 0.9% sodium chloride (MBP/ADV) 100 mL MBP  3.375 g IntraVENous Q8H Vitals:  Visit Vitals  /77   Pulse 92   Temp 98.3 °F (36.8 °C)   Resp 21   Ht 5' 4\" (1.626 m)   Wt 74.8 kg (165 lb)   SpO2 98%   BMI 28.32 kg/m²     Temp (24hrs), Av.9 °F (37.2 °C), Min:97.7 °F (36.5 °C), Max:100.7 °F (38.2 °C)        Exam:        Patient without distress. Abdomen soft, expected tenderness, fundus firm      Wound incision draining serosanguinous fluid from a 4 mm superficial separation on the right     lateral aspect of the incision. Lower extremities are negative for swelling, cords or tenderness. Labs:   Lab Results   Component Value Date/Time    WBC 19.2 (H) 2021 03:38 AM    WBC 23.2 (H) 2021 07:25 PM    WBC 22.8 (H) 2021 12:49 PM    HGB 8.1 (L) 2021 03:38 AM    HGB 8.6 (L) 2021 07:25 PM    HGB 8.4 (L) 2021 12:49 PM    HCT 25.2 (L) 2021 03:38 AM    HCT 26.4 (L) 2021 07:25 PM    HCT 25.3 (L) 2021 12:49 PM    PLATELET 734  03:38 AM    PLATELET 810  07:25 PM    PLATELET 173  12:49 PM       Recent Results (from the past 24 hour(s))   CBC WITH AUTOMATED DIFF    Collection Time: 21 12:49 PM   Result Value Ref Range    WBC 22.8 (H) 4.6 - 13.2 K/uL    RBC 2.82 (L) 4.20 - 5.30 M/uL    HGB 8.4 (L) 12.0 - 16.0 g/dL    HCT 25.3 (L) 35.0 - 45.0 %    MCV 89.7 78.0 - 100.0 FL    MCH 29.8 24.0 - 34.0 PG    MCHC 33.2 31.0 - 37.0 g/dL    RDW 14.9 (H) 11.6 - 14.5 %    PLATELET 589 337 - 747 K/uL    MPV 8.7 (L) 9.2 - 11.8 FL    NRBC 0.1 (H) 0  WBC    ABSOLUTE NRBC 0.02 (H) 0.00 - 0.01 K/uL    NEUTROPHILS 78 (H) 40 - 73 %    BAND NEUTROPHILS 3 %    LYMPHOCYTES 9 (L) 21 - 52 %    MONOCYTES 6 3 - 10 %    EOSINOPHILS 0 0 - 5 %    BASOPHILS 0 0 - 2 %    IMMATURE GRANULOCYTES 4 (H) 0.0 - 0.5 %    ABS. NEUTROPHILS 18.4 (H) 1.8 - 8.0 K/UL    ABS. LYMPHOCYTES 2.1 0.9 - 3.6 K/UL    ABS. MONOCYTES 1.4 (H) 0.05 - 1.2 K/UL    ABS. EOSINOPHILS 0.0 0.0 - 0.4 K/UL    ABS.  BASOPHILS 0.0 0.0 - 0.1 K/UL    ABS. IMM.  GRANS. 0.9 (H) 0.00 - 0.04 K/UL    DF MANUAL      RBC COMMENTS NORMOCYTIC, NORMOCHROMIC     PROTHROMBIN TIME + INR    Collection Time: 11/25/21 12:49 PM   Result Value Ref Range    Prothrombin time 15.5 (H) 11.5 - 15.2 sec    INR 1.3 (H) 0.8 - 1.2     URINALYSIS W/MICROSCOPIC    Collection Time: 11/25/21  2:00 PM   Result Value Ref Range    Color ORANGE      Appearance HAZY      Specific gravity 1.008 1.003 - 1.030      pH (UA) 7.0 5.0 - 8.0      Protein Negative NEG mg/dL    Glucose Negative NEG mg/dL    Ketone Negative NEG mg/dL    Bilirubin Negative NEG      Blood LARGE (A) NEG      Urobilinogen 0.2 0.2 - 1.0 EU/dL    Nitrites Negative NEG      Leukocyte Esterase MODERATE (A) NEG      WBC 21 to 35 0 - 5 /hpf    RBC TOO NUMEROUS TO COUNT 0 - 5 /hpf    Epithelial cells 1+ 0 - 5 /lpf    Bacteria FEW (A) NEG /hpf   CBC W/O DIFF    Collection Time: 11/25/21  7:25 PM   Result Value Ref Range    WBC 23.2 (H) 4.6 - 13.2 K/uL    RBC 2.94 (L) 4.20 - 5.30 M/uL    HGB 8.6 (L) 12.0 - 16.0 g/dL    HCT 26.4 (L) 35.0 - 45.0 %    MCV 89.8 78.0 - 100.0 FL    MCH 29.3 24.0 - 34.0 PG    MCHC 32.6 31.0 - 37.0 g/dL    RDW 15.3 (H) 11.6 - 14.5 %    PLATELET 407 650 - 410 K/uL    MPV 8.8 (L) 9.2 - 11.8 FL    NRBC 0.1 (H) 0  WBC    ABSOLUTE NRBC 0.03 (H) 0.00 - 0.01 K/uL   POTASSIUM    Collection Time: 11/25/21  7:25 PM   Result Value Ref Range    Potassium 3.7 3.5 - 5.5 mmol/L   MAGNESIUM    Collection Time: 11/25/21  7:25 PM   Result Value Ref Range    Magnesium 2.2 1.6 - 2.6 mg/dL   CALCIUM, IONIZED    Collection Time: 11/25/21  7:25 PM   Result Value Ref Range    Ionized Calcium 1.11 (L) 1.12 - 0.90 MMOL/L   METABOLIC PANEL, COMPREHENSIVE    Collection Time: 11/26/21  3:38 AM   Result Value Ref Range    Sodium 142 136 - 145 mmol/L    Potassium 3.9 3.5 - 5.5 mmol/L    Chloride 111 100 - 111 mmol/L    CO2 24 21 - 32 mmol/L    Anion gap 7 3.0 - 18 mmol/L    Glucose 86 74 - 99 mg/dL    BUN 5 (L) 7.0 - 18 MG/DL    Creatinine 0.59 (L) 0.6 - 1.3 MG/DL    BUN/Creatinine ratio 8 (L) 12 - 20      GFR est AA >60 >60 ml/min/1.73m2    GFR est non-AA >60 >60 ml/min/1.73m2    Calcium 8.1 (L) 8.5 - 10.1 MG/DL    Bilirubin, total 0.4 0.2 - 1.0 MG/DL    ALT (SGPT) 16 13 - 56 U/L    AST (SGOT) 10 10 - 38 U/L    Alk. phosphatase 91 45 - 117 U/L    Protein, total 4.9 (L) 6.4 - 8.2 g/dL    Albumin 1.8 (L) 3.4 - 5.0 g/dL    Globulin 3.1 2.0 - 4.0 g/dL    A-G Ratio 0.6 (L) 0.8 - 1.7     PROTHROMBIN TIME + INR    Collection Time: 11/26/21  3:38 AM   Result Value Ref Range    Prothrombin time 15.6 (H) 11.5 - 15.2 sec    INR 1.3 (H) 0.8 - 1.2     CBC WITH AUTOMATED DIFF    Collection Time: 11/26/21  3:38 AM   Result Value Ref Range    WBC 19.2 (H) 4.6 - 13.2 K/uL    RBC 2.79 (L) 4.20 - 5.30 M/uL    HGB 8.1 (L) 12.0 - 16.0 g/dL    HCT 25.2 (L) 35.0 - 45.0 %    MCV 90.3 78.0 - 100.0 FL    MCH 29.0 24.0 - 34.0 PG    MCHC 32.1 31.0 - 37.0 g/dL    RDW 15.4 (H) 11.6 - 14.5 %    PLATELET 496 491 - 317 K/uL    MPV 8.7 (L) 9.2 - 11.8 FL    NRBC 0.0 0  WBC    ABSOLUTE NRBC 0.00 0.00 - 0.01 K/uL    NEUTROPHILS 77 (H) 40 - 73 %    LYMPHOCYTES 11 (L) 21 - 52 %    MONOCYTES 6 3 - 10 %    EOSINOPHILS 0 0 - 5 %    BASOPHILS 0 0 - 2 %    IMMATURE GRANULOCYTES 6 (H) 0.0 - 0.5 %    ABS. NEUTROPHILS 14.8 (H) 1.8 - 8.0 K/UL    ABS. LYMPHOCYTES 2.0 0.9 - 3.6 K/UL    ABS. MONOCYTES 1.1 0.05 - 1.2 K/UL    ABS. EOSINOPHILS 0.0 0.0 - 0.4 K/UL    ABS. BASOPHILS 0.1 0.0 - 0.1 K/UL    ABS. IMM. GRANS.  1.2 (H) 0.00 - 0.04 K/UL    DF AUTOMATED     PTT    Collection Time: 11/26/21  3:38 AM   Result Value Ref Range    aPTT 33.8 23.0 - 36.4 SEC   GLUCOSE, POC    Collection Time: 11/26/21  5:17 AM   Result Value Ref Range    Glucose (POC) 91 70 - 110 mg/dL       Petra Brandon MD

## 2021-11-27 ENCOUNTER — APPOINTMENT (OUTPATIENT)
Dept: CT IMAGING | Age: 26
DRG: 776 | End: 2021-11-27
Attending: OBSTETRICS & GYNECOLOGY
Payer: COMMERCIAL

## 2021-11-27 LAB
ALBUMIN SERPL-MCNC: 1.7 G/DL (ref 3.4–5)
ALBUMIN/GLOB SERPL: 0.6 {RATIO} (ref 0.8–1.7)
ALP SERPL-CCNC: 86 U/L (ref 45–117)
ALT SERPL-CCNC: 21 U/L (ref 13–56)
ANION GAP SERPL CALC-SCNC: 8 MMOL/L (ref 3–18)
AST SERPL-CCNC: 15 U/L (ref 10–38)
BACTERIA SPEC CULT: NORMAL
BILIRUB SERPL-MCNC: 0.2 MG/DL (ref 0.2–1)
BUN SERPL-MCNC: 6 MG/DL (ref 7–18)
BUN/CREAT SERPL: 14 (ref 12–20)
CALCIUM SERPL-MCNC: 7.8 MG/DL (ref 8.5–10.1)
CHLORIDE SERPL-SCNC: 111 MMOL/L (ref 100–111)
CO2 SERPL-SCNC: 24 MMOL/L (ref 21–32)
CREAT SERPL-MCNC: 0.44 MG/DL (ref 0.6–1.3)
ERYTHROCYTE [DISTWIDTH] IN BLOOD BY AUTOMATED COUNT: 15 % (ref 11.6–14.5)
ERYTHROCYTE [DISTWIDTH] IN BLOOD BY AUTOMATED COUNT: 15.1 % (ref 11.6–14.5)
ERYTHROCYTE [DISTWIDTH] IN BLOOD BY AUTOMATED COUNT: 15.1 % (ref 11.6–14.5)
GLOBULIN SER CALC-MCNC: 3 G/DL (ref 2–4)
GLUCOSE SERPL-MCNC: 76 MG/DL (ref 74–99)
HCT VFR BLD AUTO: 22.8 % (ref 35–45)
HCT VFR BLD AUTO: 22.9 % (ref 35–45)
HCT VFR BLD AUTO: 23.9 % (ref 35–45)
HGB BLD-MCNC: 7.3 G/DL (ref 12–16)
HGB BLD-MCNC: 7.5 G/DL (ref 12–16)
HGB BLD-MCNC: 7.7 G/DL (ref 12–16)
INR PPP: 1.2 (ref 0.8–1.2)
MCH RBC QN AUTO: 29.2 PG (ref 24–34)
MCH RBC QN AUTO: 29.4 PG (ref 24–34)
MCH RBC QN AUTO: 30.6 PG (ref 24–34)
MCHC RBC AUTO-ENTMCNC: 31.9 G/DL (ref 31–37)
MCHC RBC AUTO-ENTMCNC: 32.2 G/DL (ref 31–37)
MCHC RBC AUTO-ENTMCNC: 32.9 G/DL (ref 31–37)
MCV RBC AUTO: 91.2 FL (ref 78–100)
MCV RBC AUTO: 91.6 FL (ref 78–100)
MCV RBC AUTO: 93.1 FL (ref 78–100)
NRBC # BLD: 0.02 K/UL (ref 0–0.01)
NRBC # BLD: 0.02 K/UL (ref 0–0.01)
NRBC # BLD: 0.04 K/UL (ref 0–0.01)
NRBC BLD-RTO: 0.1 PER 100 WBC
NRBC BLD-RTO: 0.2 PER 100 WBC
NRBC BLD-RTO: 0.4 PER 100 WBC
PLATELET # BLD AUTO: 309 K/UL (ref 135–420)
PLATELET # BLD AUTO: 355 K/UL (ref 135–420)
PLATELET # BLD AUTO: 381 K/UL (ref 135–420)
PMV BLD AUTO: 8.8 FL (ref 9.2–11.8)
PMV BLD AUTO: 8.9 FL (ref 9.2–11.8)
PMV BLD AUTO: 8.9 FL (ref 9.2–11.8)
POTASSIUM SERPL-SCNC: 3.4 MMOL/L (ref 3.5–5.5)
PROT SERPL-MCNC: 4.7 G/DL (ref 6.4–8.2)
PROTHROMBIN TIME: 14.8 SEC (ref 11.5–15.2)
RBC # BLD AUTO: 2.45 M/UL (ref 4.2–5.3)
RBC # BLD AUTO: 2.5 M/UL (ref 4.2–5.3)
RBC # BLD AUTO: 2.62 M/UL (ref 4.2–5.3)
SERVICE CMNT-IMP: NORMAL
SODIUM SERPL-SCNC: 143 MMOL/L (ref 136–145)
VANCOMYCIN SERPL-MCNC: 19 UG/ML (ref 5–40)
WBC # BLD AUTO: 10.7 K/UL (ref 4.6–13.2)
WBC # BLD AUTO: 12.1 K/UL (ref 4.6–13.2)
WBC # BLD AUTO: 13.5 K/UL (ref 4.6–13.2)

## 2021-11-27 PROCEDURE — 85027 COMPLETE CBC AUTOMATED: CPT

## 2021-11-27 PROCEDURE — 74011250637 HC RX REV CODE- 250/637: Performed by: HOSPITALIST

## 2021-11-27 PROCEDURE — 74011250636 HC RX REV CODE- 250/636: Performed by: HOSPITALIST

## 2021-11-27 PROCEDURE — 65270000029 HC RM PRIVATE

## 2021-11-27 PROCEDURE — 74011000636 HC RX REV CODE- 636: Performed by: OBSTETRICS & GYNECOLOGY

## 2021-11-27 PROCEDURE — 80202 ASSAY OF VANCOMYCIN: CPT

## 2021-11-27 PROCEDURE — 74011250636 HC RX REV CODE- 250/636: Performed by: INTERNAL MEDICINE

## 2021-11-27 PROCEDURE — 74011250637 HC RX REV CODE- 250/637: Performed by: OBSTETRICS & GYNECOLOGY

## 2021-11-27 PROCEDURE — 74174 CTA ABD&PLVS W/CONTRAST: CPT

## 2021-11-27 PROCEDURE — 36415 COLL VENOUS BLD VENIPUNCTURE: CPT

## 2021-11-27 PROCEDURE — 80053 COMPREHEN METABOLIC PANEL: CPT

## 2021-11-27 PROCEDURE — 85610 PROTHROMBIN TIME: CPT

## 2021-11-27 PROCEDURE — 74011250637 HC RX REV CODE- 250/637: Performed by: ADVANCED PRACTICE MIDWIFE

## 2021-11-27 PROCEDURE — 74011000250 HC RX REV CODE- 250: Performed by: FAMILY MEDICINE

## 2021-11-27 PROCEDURE — 74011000258 HC RX REV CODE- 258: Performed by: HOSPITALIST

## 2021-11-27 PROCEDURE — 74011250636 HC RX REV CODE- 250/636: Performed by: FAMILY MEDICINE

## 2021-11-27 RX ORDER — IBUPROFEN 400 MG/1
800 TABLET ORAL 3 TIMES DAILY
Status: DISCONTINUED | OUTPATIENT
Start: 2021-11-27 | End: 2021-11-29 | Stop reason: HOSPADM

## 2021-11-27 RX ORDER — POTASSIUM CHLORIDE 20 MEQ/1
40 TABLET, EXTENDED RELEASE ORAL
Status: COMPLETED | OUTPATIENT
Start: 2021-11-27 | End: 2021-11-27

## 2021-11-27 RX ADMIN — PIPERACILLIN AND TAZOBACTAM 3.38 G: 3; .375 INJECTION, POWDER, LYOPHILIZED, FOR SOLUTION INTRAVENOUS at 02:16

## 2021-11-27 RX ADMIN — FAMOTIDINE 20 MG: 10 INJECTION, SOLUTION INTRAVENOUS at 08:22

## 2021-11-27 RX ADMIN — ACETAMINOPHEN 650 MG: 325 TABLET ORAL at 20:02

## 2021-11-27 RX ADMIN — IBUPROFEN 800 MG: 400 TABLET, FILM COATED ORAL at 21:03

## 2021-11-27 RX ADMIN — PIPERACILLIN AND TAZOBACTAM 3.38 G: 3; .375 INJECTION, POWDER, LYOPHILIZED, FOR SOLUTION INTRAVENOUS at 09:20

## 2021-11-27 RX ADMIN — IBUPROFEN 800 MG: 400 TABLET, FILM COATED ORAL at 00:36

## 2021-11-27 RX ADMIN — METHYLERGONOVINE 200 MCG: 0.2 TABLET ORAL at 00:00

## 2021-11-27 RX ADMIN — PIPERACILLIN AND TAZOBACTAM 3.38 G: 3; .375 INJECTION, POWDER, LYOPHILIZED, FOR SOLUTION INTRAVENOUS at 21:07

## 2021-11-27 RX ADMIN — IOPAMIDOL 100 ML: 755 INJECTION, SOLUTION INTRAVENOUS at 14:31

## 2021-11-27 RX ADMIN — Medication 10 ML: at 21:10

## 2021-11-27 RX ADMIN — ACETAMINOPHEN 650 MG: 325 TABLET ORAL at 08:22

## 2021-11-27 RX ADMIN — PIPERACILLIN AND TAZOBACTAM 3.38 G: 3; .375 INJECTION, POWDER, LYOPHILIZED, FOR SOLUTION INTRAVENOUS at 15:58

## 2021-11-27 RX ADMIN — FAMOTIDINE 20 MG: 10 INJECTION, SOLUTION INTRAVENOUS at 20:03

## 2021-11-27 RX ADMIN — IBUPROFEN 800 MG: 400 TABLET, FILM COATED ORAL at 09:13

## 2021-11-27 RX ADMIN — IRON SUCROSE 200 MG: 20 INJECTION, SOLUTION INTRAVENOUS at 09:13

## 2021-11-27 RX ADMIN — POTASSIUM CHLORIDE 40 MEQ: 1500 TABLET, EXTENDED RELEASE ORAL at 08:22

## 2021-11-27 RX ADMIN — VANCOMYCIN HYDROCHLORIDE 1250 MG: 10 INJECTION, POWDER, LYOPHILIZED, FOR SOLUTION INTRAVENOUS at 12:16

## 2021-11-27 RX ADMIN — ACETAMINOPHEN 650 MG: 325 TABLET ORAL at 02:15

## 2021-11-27 RX ADMIN — ACETAMINOPHEN 650 MG: 325 TABLET ORAL at 14:02

## 2021-11-27 RX ADMIN — VANCOMYCIN HYDROCHLORIDE 1250 MG: 10 INJECTION, POWDER, LYOPHILIZED, FOR SOLUTION INTRAVENOUS at 00:01

## 2021-11-27 NOTE — PROGRESS NOTES
Hospitalist Progress Note-critical care note     Patient: Harrison Salazar MRN: [id number]  CSN: [id number]    YOB: 1995  Age: 27 y.o. Sex: female    DOA: 11/25/2021 LOS:  LOS: 2 days            Chief complaint: wound infection, postpartum hemorrhage, uti     Assessment/Plan         Hospital Problems  Date Reviewed: 11/25/2021          Codes Class Noted POA    Wound infection ICD-10-CM: T14. 8XXA, L08.9  ICD-9-CM: 958.3  11/26/2021 Unknown        Postpartum hemorrhage ICD-10-CM: O72.1  ICD-9-CM: 666.10  11/25/2021 Yes        Shock ([facility] [address] ICD-10-CM: R57.9  ICD-9-CM: 785.50  11/25/2021 Yes        Sepsis ([facility] [address] ICD-10-CM: A41.9  ICD-9-CM: 038.9, 995.91  11/25/2021 Yes        Pleural effusion, bilateral ICD-10-CM: J90  ICD-9-CM: 511.9  11/25/2021 Yes        Postoperative complication QEY-73-KQ: O02. 9XXA  ICD-9-CM: 998.9  11/25/2021 Yes        Fever ICD-10-CM: R50.9  ICD-9-CM: 780.60  11/25/2021 Yes        UTI (urinary tract infection) ICD-10-CM: N39.0  ICD-9-CM: 599.0  11/22/2021 Yes               Shock resolved  Received blood transfusion   Leukocytosis improving -down to 12.1  So far bcx still negative     Postpartum hemorrhage   small amount blood clots   On methergen per primary team     Pleural effusion   Echo done recently and cardiologist saw pt   So far no sob reported       uti   On zosyn   Urine cx negative     wound infection postoperative complication   Continue zosyn -broad coverage  Wound cx HEAVY PROBABLE PSEUDOMONAS SPECIES, checking for 2nd ORGANISM  Continue local wound care     Hypokalemia   k replacement     Anemia   Will give iron transfusion     Subjective : no fever, small amount blood like period    Discussed with dr. Frank Nicolas   Rn:stable   Disposition :tbd,   Review of systems:    General: No fevers or chills. Cardiovascular: No chest pain or pressure. No palpitations. Pulmonary: No shortness of breath. Gastrointestinal: No nausea, vomiting.      Vital signs/Intake and Output:  Visit Vitals  /78 (BP 1 Location: Left upper arm, BP Patient Position: At rest;Lying)   Pulse 70   Temp 97.8 °F (36.6 °C)   Resp 16   Ht 5' 4\" (1.626 m)   Wt 74.8 kg (165 lb)   SpO2 98%   BMI 28.32 kg/m²     Current Shift:  No intake/output data recorded. Last three shifts:  11/25 1901 - 11/27 0700  In: 1150 [P.O.:1050; I.V.:100]  Out: 1450 [Urine:1450]    Physical Exam:  General: WD, WN. Alert, cooperative, no acute distress    HEENT: NC, Atraumatic. PERRLA, anicteric sclerae. Lungs: CTA Bilaterally. No Wheezing/Rhonchi/Rales. Heart:  Regular  rhythm,  No murmur, No Rubs, No Gallops  Abdomen: Soft, Non distended, Non tender. +Bowel sounds,   Extremities: No c/c/e  Psych:   Not anxious or agitated. Neurologic:  No acute neurological deficit. Labs: Results:       Chemistry Recent Labs     11/27/21 0235 11/26/21 0338 11/25/21 1925 11/25/21  0523 11/25/21  0523   GLU 76 86  --   --  116*    142  --   --  142   K 3.4* 3.9 3.7   < > 3.4*    111  --   --  110   CO2 24 24  --   --  24   BUN 6* 5*  --   --  12   CREA 0.44* 0.59*  --   --  0.67   CA 7.8* 8.1*  --   --  8.0*   AGAP 8 7  --   --  8   BUCR 14 8*  --   --  18   AP 86 91  --   --  110   TP 4.7* 4.9*  --   --  5.1*   ALB 1.7* 1.8*  --   --  1.8*   GLOB 3.0 3.1  --   --  3.3   AGRAT 0.6* 0.6*  --   --  0.5*    < > = values in this interval not displayed.       CBC w/Diff Recent Labs     11/27/21 0235 11/26/21 1922 11/26/21  1050 11/26/21 0338 11/26/21  0338 11/25/21  1925 11/25/21  1249 11/25/21  0523 11/25/21  0523   WBC 13.5* 17.5* 19.4*   < > 19.2*   < > 22.8*   < > 17.8*   RBC 2.45* 2.66* 2.68*   < > 2.79*   < > 2.82*   < > 2.63*   HGB 7.5* 7.8* 8.2*   < > 8.1*   < > 8.4*   < > 8.0*   HCT 22.8* 24.2* 24.8*   < > 25.2*   < > 25.3*   < > 24.9*    442* 318   < > 343   < > 310   < > 473*   GRANS  --   --   --   --  77*  --  78*  --  74*   LYMPH  --   --   --   --  11*  --  9*  --  14*   EOS  --   --   -- --  0  --  0  --  0    < > = values in this interval not displayed. Cardiac Enzymes No results for input(s): CPK, CKND1, MARSHALL in the last 72 hours. No lab exists for component: CKRMB, TROIP   Coagulation Recent Labs     21  0338   PTP 14.8 15.6*   INR 1.2 1.3*   APTT  --  33.8       Lipid Panel No results found for: CHOL, CHOLPOCT, CHOLX, CHLST, CHOLV, 081421, HDL, HDLP, LDL, LDLC, DLDLP, 011826, VLDLC, VLDL, TGLX, TRIGL, TRIGP, TGLPOCT, CHHD, CHHDX   BNP No results for input(s): BNPP in the last 72 hours. Liver Enzymes Recent Labs     21  023   TP 4.7*   ALB 1.7*   AP 86      Thyroid Studies No results found for: T4, T3U, TSH, TSHEXT, TSHEXT     Procedures/imaging: see electronic medical records for all procedures/Xrays and details which were not copied into this note but were reviewed prior to creation of Plan    CTA CHEST ABD PELV W WO CONT    Result Date: 2021  HISTORY: -From Provider: postpartum fever/ dyspnea/ c section a -Additional: One-week postop . Chills. Fever. Passing blood clots vaginally this exam. Technique: Axial imaging was obtained dynamically through the pre-and post contrast aorta using high-resolution CT angiographic protocol, without complications. In addition, coronal and sagittal reconstructed MIP arteriograms were performed, to better evaluate the aorta, and to increase sensitivity for detection of aortic dissection and aneurysm. Dose reduction techniques used: automated exposure control, adjustment of the mAs and/or kVp according to patient size, and iterative reconstruction techniques. Comparison study: . Findings: Cardiovascular: Aorta: Normal in caliber, without evidence of aneurysm or dissection]. Pulmonary arteries: opacified completely, without evidence of filling defects to suggest pulmonary thromboembolism.   Please note, the study was performed with optimal contrast bolus timing to the aorta is not optimal for assessment of pulmonary emboli. Coronary arteries: Unremarkable. Heart: The heart is not enlarged. There is a small pericardial effusion. . ] Abdominal visceral arteries: Celiac, SMA, bilateral renal arteries, VALERIA without significant origin disease/calcification. Iliac arteries: unremarkable. CT CHEST: Pulmonary parenchyma: There is septal interstitial thickening. There is mild respiratory motion degradation limiting evaluation of the lung parenchyma. Mild right and minimal left dependent atelectasis/consolidation. Lymph nodes: There is no significant axillary, hilar, or mediastinal lymphadenopathy. Pleura: Moderate right and small left pleural effusions. Chest wall/lower neck soft tissues: CT ABDOMEN: Liver : Unremarkable, without focal lesions. Vascular enhancement: Portal, splenic vein, SMV are unremarkable Spleen: Unremarkable, without focal lesions. Adrenal glands: Unremarkable, without focal lesions. Pancreas: Unremarkable, without focal lesions. Stomach/duodenum: Unremarkable. Gallbladder: No calcified gallstones or surrounding inflammatory changes identified. Biliary system: Unremarkable. Retroperitoneum: Kidneys: No hydronephrosis or renal calculi. No focal lesions. IVC: Enlarged, not opacified at phase of contrast obtained. . Lymph nodes:  No significantly enlarged lymph nodes. Intraperitoneal free air:None seen. Intraperitoneal free fluid: Small amount of hypodense fluid about the gallbladder, liver and in the cul-de-sac. No hyperdense hemorrhage. CT PELVIS: Bowel : Unremarkable. Appendix: Normal as seen. No localized right lower quadrant or pericecal inflammatory changes. Bladder: Mild bladder wall thickening 1in the underdistended state. [No bladder stones are seen.] Other:  Enlarged postpartum uterus, containing internal gas. Prominent enhancing uterine and parametrial vessels. There is stranding of the anterior abdominal wall fat.  There are foci of cutaneous gas in the low pelvis superficial to the anterior abdominal wall. There is hypodense thickening/edema about the umbilicus. Osseous structures: Unremarkable ]     1. Prominent enhancing uterine and parametrial vessels, with gas in the enlarged uterus. This is nonspecific and the postpartum/post  state, and can be normal. CT cannot exclude endometritis if clinically suspected. Contains gas in the anterior abdominal wall superficial to the musculature, additionally nonspecific and can be postoperative related to phlegmon/infection. No circumscribed, drainable collections are seen. 2. Bilateral pleural effusions and septal interstitial thickening, which can be seen in setting of fluid overload/pulmonary edema. Enlarged IVC additionally which can be seen in the setting of overhydration. Bibasilar dependent atelectasis or consolidation. 3. Intraperitoneal free fluid, and fluid about the distended gallbladder, without calcified gallstones. This is nonspecific and may be related to recent postoperative state If clinical findings suggest cholecystitis, then dedicated gallbladder ultrasound could best further assess. 4. Mild bladder wall thickening, which can be seen in the setting of underdistention or superimposed infection/cystitis, best correlated with clinical and laboratory/urinalysis findings. US ABD LTD    Result Date: 2021  EXAM: Limited right upper quadrant abdominal ultrasound INDICATION: Right upper quadrant pain. COMPARISON: CT one day prior. TECHNIQUE: Real-time abdomen/right upper quadrant sonography in multiple planes was performed with image documentation. Grayscale, color flow Doppler imaging, and velocity spectral waveform analysis of the portal vein was performed (duplex imaging). _______________ FINDINGS: LIVER: Normal in echotexture. No focal mass Color flow Doppler and velocity spectral waveform analysis of the portal vein shows normal (hepatopetal) direction of flow. Dain Mosher BILIARY SYSTEM: No intrahepatic biliary dilatation.  Common bile duct is normal in caliber measuring 0.6 cm. GALLBLADDER: No gallstones or gallbladder wall thickening. No pericholecystic fluid. Small polyp measuring 2 mm. RIGHT KIDNEY: 10.5 cm in length. No hydronephrosis or renal mass. No visible calculi. PANCREAS: Head and body are unremarkable in appearance though the tail is obscured by overlying bowel gas. IVC: Visualized portions are unremarkable in appearance. OTHER: Trace perihepatic fluid. Small right effusion. _______________     No gallstones or secondary findings of cholecystitis. Trace perihepatic fluid. Small right effusion. XR CHEST PORT    Result Date: 11/25/2021  EXAM: CHEST RADIOGRAPH, SINGLE VIEW CLINICAL INDICATION/HISTORY: hypoxia      <Additional:  None. COMPARISON: None. TECHNIQUE: Portable frontal view of the chest was obtained. _______________ FINDINGS: SUPPORT DEVICES: None. HEART AND MEDIASTINUM: Cardiomediastinal silhouette appears within normal limits. LUNGS AND PLEURAL SPACES: Pulmonary vessels are normal.  Lungs are clear. Costophrenic angles are sharp. No pneumothorax. BONY THORAX AND SOFT TISSUES: No acute osseous abnormality. _______________     No active cardiopulmonary disease. ECHO ADULT COMPLETE    Result Date: 11/22/2021  · LV: Estimated LVEF is 50 - 55%. Normal cavity size, wall thickness and diastolic function. Low normal systolic function. E/E'= 9.64. · MV: Moderate mitral valve regurgitation is present. · TV: Mild to moderate tricuspid valve regurgitation is present. · PV: Mild pulmonic valve regurgitation is present. · PA: Pulmonary arterial systolic pressure is 34 mmHg. DUPLEX LOWER EXT VENOUS BILAT    Result Date: 11/22/2021  · No evidence of deep vein thrombosis in the right lower extremity. · No evidence of deep vein thrombosis in the left lower extremity.         Alyse Melo MD

## 2021-11-27 NOTE — PROGRESS NOTES
0338 Bedside and Verbal shift change report given to CHRISTELLE Styles RN (oncoming nurse) by ADOLFO Oliveros RN (offgoing nurse). Report included the following information SBAR, Kardex, OR Summary, Procedure Summary, Intake/Output, MAR and Recent Results. 1806 shift assessment complete and scheduled meds given    0913 scheduled meds given    1020 patient updated on CTA, pt verbalized understanding    1100 spoke with patient regarding midwife speaking with patients SO, received verbal confirmation from pt to allow midwife to relay information regarding pt care/ersults to patients SO    1216 antibiotics infusing, pt has no needs or concerns at this time    1327 pump beeping, no needs or concerns at this time    1402 tylenol given    1430 taken for CTA    1558 reassessment complete and antibiotic hung    1630 unhooked from IV, this nurse to leave patient to sleep unless pt needs nurse    1830 no needs or concerns at this time    1910 Bedside and Verbal shift change report given to THANH Easley RN and Madeleine Bautista RN (oncoming nurse) by Beatriz Styles RN (offgoing nurse). Report included the following information SBAR, Kardex, OR Summary, Procedure Summary, Intake/Output, MAR and Recent Results.

## 2021-11-27 NOTE — PROGRESS NOTES
1910 Bedside and Verbal shift change report given to Clarissa Vick RN   (oncoming nurse) by Beatriz Harmon. Tilmon Goldmann, RN  (offgoing nurse). Report included the following information SBAR, Kardex, Intake/Output, MAR and Recent Results. 2030 Pt. Joined at bedside AAOx4. Vital signs stable. Will continue to monitor. Pain 3/10. Educated on pain management. Pain medication administered as ordered. Whiteboard updated. Educated on plan of care and signs and symptoms to report. No further questions on concerns at this time. Fundus firm at U -3 small rubra lochia. No clots noted. Serous drainage to dressing, breakthrough. Assessment complete. See flow sheet. Callbell within reach. Bed in lowest position. Visit Vitals  /84 (BP 1 Location: Left upper arm, BP Patient Position: At rest)   Pulse 76   Temp 99.2 °F (37.3 °C)   Resp 16   Ht 5' 4\" (1.626 m)   Wt 74.8 kg (165 lb)   SpO2 100%   BMI 28.32 kg/m²       2106 Dressing removed. Jolynn pad placed to incision. Educated on and assisted with dominal binder. Needs and concerns addressesed. 0005 Pt vital signs stable. Medications administered as ordered. Pain rated 5/10 to right side of incisions. Will follow up with provider for pain management     0025 Discussed pt pain rating and pain medication with Mary Narayanan CNM. Order for motrin given. 0219 Medications administered as ordered. Fundus firm at U-3, scant rubra lochia. Jolynn pad to incision with serous drainage to right side of incision. reassessment complete. See flow sheet. 0356 Pt vital signs stable. Needs and concerns addressed. 6462 Bedside and Verbal shift change report given to CHRISTELLE Styles RN  (oncoming nurse) by ADOLFO Oliveros (offgoing nurse). Report included the following information SBAR, Kardex, Intake/Output, MAR and Recent Results.

## 2021-11-27 NOTE — PROGRESS NOTES
Problem: Falls - Risk of  Goal: *Absence of Falls  Description: Document Melanie Lozano Fall Risk and appropriate interventions in the flowsheet.   Outcome: Progressing Towards Goal  Note: Fall Risk Interventions:  Mobility Interventions: Assess mobility with egress test, Communicate number of staff needed for ambulation/transfer, Patient to call before getting OOB         Medication Interventions: Teach patient to arise slowly    Elimination Interventions: Call light in reach, Patient to call for help with toileting needs, Toilet paper/wipes in reach, Toileting schedule/hourly rounds              Problem: Pain  Goal: *Control of Pain  Outcome: Progressing Towards Goal

## 2021-11-27 NOTE — PROGRESS NOTES
Problem: Falls - Risk of  Goal: *Absence of Falls  Description: Document Melanie Lozano Fall Risk and appropriate interventions in the flowsheet.   Outcome: Progressing Towards Goal  Note: Fall Risk Interventions:  Mobility Interventions: Assess mobility with egress test, Communicate number of staff needed for ambulation/transfer, Patient to call before getting OOB         Medication Interventions: Teach patient to arise slowly    Elimination Interventions: Call light in reach, Patient to call for help with toileting needs, Toilet paper/wipes in reach, Toileting schedule/hourly rounds              Problem: Patient Education: Go to Patient Education Activity  Goal: Patient/Family Education  Outcome: Progressing Towards Goal     Problem: Pain  Goal: *Control of Pain  Outcome: Progressing Towards Goal     Problem: Patient Education: Go to Patient Education Activity  Goal: Patient/Family Education  Outcome: Progressing Towards Goal

## 2021-11-27 NOTE — PROGRESS NOTES
Gynecology Progress Note    HDHD 3/PPD 13 s/p 1 LTCS with readmission for PPH, Anemia, Hypoxia, FUO      Pain controlled on current medication. Voiding without difficulty. Patient is passing flatus. No N/V/F/C. Ambulating. Tolerating po intake. All current results and plan of care reviewed with the patient. Vitals:  Blood pressure 122/77, pulse 60, temperature 97.7 °F (36.5 °C), resp. rate 18, height 5' 4\" (1.626 m), weight 74.8 kg (165 lb), SpO2 100 %. Temp (24hrs), Av.2 °F (36.8 °C), Min:97.6 °F (36.4 °C), Max:99.2 °F (37.3 °C)        Exam:  Patient without distress. Abdomen soft,  nontender. Incision dry and clean without erythema. Mild induration on superior aspect               Lower extremities are negative for swelling, cords, or tenderness. Lab/Data Review: All lab results for the last 24 hours reviewed. Assessment and Plan:      1. PPH - s/p transfusion of PRBC. Bleeding now minimal to moderate - appropriate pp bleeding pattern. Completed methergine 48 hour course. Monitor for recurrent PPH. Evaluate for possible pseudoaneurysm    2. Anemia - s/p transfusion of PRBC. Iron infusion today per Hospitalist.     3. Pulmonary edema - no sign/sx of recurrent pulmonary edema. Pt on room air. 4. Possible endometritis - versus wound infection - on Zosyn and Vancomycin. Preliminary wound culture with Pseudomonas which would be covered on current abx regimen. 5. Possible urosepsis - UCX negative to date. Blood culture negative to date. Pt to continue on abx treatment at \[facility]\"" time.     Wiliam Pelayo M.D.

## 2021-11-28 PROBLEM — D62 ANEMIA DUE TO ACUTE BLOOD LOSS: Status: ACTIVE | Noted: 2021-11-28

## 2021-11-28 LAB
ALBUMIN SERPL-MCNC: 1.7 G/DL (ref 3.4–5)
ALBUMIN/GLOB SERPL: 0.6 {RATIO} (ref 0.8–1.7)
ALP SERPL-CCNC: 76 U/L (ref 45–117)
ALT SERPL-CCNC: 19 U/L (ref 13–56)
ANION GAP SERPL CALC-SCNC: 7 MMOL/L (ref 3–18)
AST SERPL-CCNC: 9 U/L (ref 10–38)
BACTERIA SPEC CULT: NORMAL
BACTERIA SPEC CULT: NORMAL
BILIRUB SERPL-MCNC: 0.2 MG/DL (ref 0.2–1)
BUN SERPL-MCNC: 7 MG/DL (ref 7–18)
BUN/CREAT SERPL: 15 (ref 12–20)
CALCIUM SERPL-MCNC: 7.9 MG/DL (ref 8.5–10.1)
CHLORIDE SERPL-SCNC: 113 MMOL/L (ref 100–111)
CO2 SERPL-SCNC: 24 MMOL/L (ref 21–32)
CREAT SERPL-MCNC: 0.48 MG/DL (ref 0.6–1.3)
ERYTHROCYTE [DISTWIDTH] IN BLOOD BY AUTOMATED COUNT: 15 % (ref 11.6–14.5)
GLOBULIN SER CALC-MCNC: 3 G/DL (ref 2–4)
GLUCOSE SERPL-MCNC: 66 MG/DL (ref 74–99)
GRAM STN SPEC: NORMAL
GRAM STN SPEC: NORMAL
HCT VFR BLD AUTO: 21 % (ref 35–45)
HCT VFR BLD AUTO: 24.8 % (ref 35–45)
HGB BLD-MCNC: 6.7 G/DL (ref 12–16)
HGB BLD-MCNC: 7.8 G/DL (ref 12–16)
MCH RBC QN AUTO: 29.3 PG (ref 24–34)
MCHC RBC AUTO-ENTMCNC: 31.9 G/DL (ref 31–37)
MCV RBC AUTO: 91.7 FL (ref 78–100)
NRBC # BLD: 0.03 K/UL (ref 0–0.01)
NRBC BLD-RTO: 0.2 PER 100 WBC
PLATELET # BLD AUTO: 366 K/UL (ref 135–420)
PMV BLD AUTO: 8.8 FL (ref 9.2–11.8)
POTASSIUM SERPL-SCNC: 3.3 MMOL/L (ref 3.5–5.5)
PROT SERPL-MCNC: 4.7 G/DL (ref 6.4–8.2)
RBC # BLD AUTO: 2.29 M/UL (ref 4.2–5.3)
SERVICE CMNT-IMP: NORMAL
SODIUM SERPL-SCNC: 144 MMOL/L (ref 136–145)
WBC # BLD AUTO: 12.3 K/UL (ref 4.6–13.2)

## 2021-11-28 PROCEDURE — 74011250637 HC RX REV CODE- 250/637: Performed by: ADVANCED PRACTICE MIDWIFE

## 2021-11-28 PROCEDURE — 74011250636 HC RX REV CODE- 250/636: Performed by: HOSPITALIST

## 2021-11-28 PROCEDURE — 85014 HEMATOCRIT: CPT

## 2021-11-28 PROCEDURE — 36415 COLL VENOUS BLD VENIPUNCTURE: CPT

## 2021-11-28 PROCEDURE — 74011000250 HC RX REV CODE- 250: Performed by: FAMILY MEDICINE

## 2021-11-28 PROCEDURE — 74011250636 HC RX REV CODE- 250/636: Performed by: FAMILY MEDICINE

## 2021-11-28 PROCEDURE — 74011000258 HC RX REV CODE- 258: Performed by: HOSPITALIST

## 2021-11-28 PROCEDURE — 85027 COMPLETE CBC AUTOMATED: CPT

## 2021-11-28 PROCEDURE — 80053 COMPREHEN METABOLIC PANEL: CPT

## 2021-11-28 PROCEDURE — 65270000029 HC RM PRIVATE

## 2021-11-28 PROCEDURE — 74011250637 HC RX REV CODE- 250/637: Performed by: HOSPITALIST

## 2021-11-28 PROCEDURE — 74011250636 HC RX REV CODE- 250/636: Performed by: INTERNAL MEDICINE

## 2021-11-28 RX ORDER — POTASSIUM CHLORIDE 20 MEQ/1
40 TABLET, EXTENDED RELEASE ORAL 2 TIMES DAILY
Status: DISCONTINUED | OUTPATIENT
Start: 2021-11-28 | End: 2021-11-28

## 2021-11-28 RX ORDER — POTASSIUM CHLORIDE 20 MEQ/1
40 TABLET, EXTENDED RELEASE ORAL 2 TIMES DAILY
Status: COMPLETED | OUTPATIENT
Start: 2021-11-28 | End: 2021-11-28

## 2021-11-28 RX ORDER — VANCOMYCIN HYDROCHLORIDE
1250 EVERY 12 HOURS
Status: DISCONTINUED | OUTPATIENT
Start: 2021-11-29 | End: 2021-11-28

## 2021-11-28 RX ADMIN — VANCOMYCIN HYDROCHLORIDE 1250 MG: 10 INJECTION, POWDER, LYOPHILIZED, FOR SOLUTION INTRAVENOUS at 00:14

## 2021-11-28 RX ADMIN — FAMOTIDINE 20 MG: 10 INJECTION, SOLUTION INTRAVENOUS at 20:59

## 2021-11-28 RX ADMIN — POTASSIUM CHLORIDE 40 MEQ: 1500 TABLET, EXTENDED RELEASE ORAL at 20:59

## 2021-11-28 RX ADMIN — ACETAMINOPHEN 650 MG: 325 TABLET ORAL at 15:05

## 2021-11-28 RX ADMIN — ACETAMINOPHEN 650 MG: 325 TABLET ORAL at 20:59

## 2021-11-28 RX ADMIN — PIPERACILLIN AND TAZOBACTAM 3.38 G: 3; .375 INJECTION, POWDER, LYOPHILIZED, FOR SOLUTION INTRAVENOUS at 21:00

## 2021-11-28 RX ADMIN — FAMOTIDINE 20 MG: 10 INJECTION, SOLUTION INTRAVENOUS at 08:13

## 2021-11-28 RX ADMIN — POTASSIUM CHLORIDE 40 MEQ: 1500 TABLET, EXTENDED RELEASE ORAL at 08:11

## 2021-11-28 RX ADMIN — VANCOMYCIN HYDROCHLORIDE 1250 MG: 10 INJECTION, POWDER, LYOPHILIZED, FOR SOLUTION INTRAVENOUS at 12:00

## 2021-11-28 RX ADMIN — IBUPROFEN 800 MG: 400 TABLET, FILM COATED ORAL at 11:55

## 2021-11-28 RX ADMIN — PIPERACILLIN AND TAZOBACTAM 3.38 G: 3; .375 INJECTION, POWDER, LYOPHILIZED, FOR SOLUTION INTRAVENOUS at 03:25

## 2021-11-28 RX ADMIN — ACETAMINOPHEN 650 MG: 325 TABLET ORAL at 03:25

## 2021-11-28 RX ADMIN — ACETAMINOPHEN 650 MG: 325 TABLET ORAL at 08:10

## 2021-11-28 RX ADMIN — PIPERACILLIN AND TAZOBACTAM 3.38 G: 3; .375 INJECTION, POWDER, LYOPHILIZED, FOR SOLUTION INTRAVENOUS at 15:05

## 2021-11-28 RX ADMIN — PIPERACILLIN AND TAZOBACTAM 3.38 G: 3; .375 INJECTION, POWDER, LYOPHILIZED, FOR SOLUTION INTRAVENOUS at 09:23

## 2021-11-28 NOTE — PROGRESS NOTES
Pharmacy Dosing Services: Vancomycin   SCr = 0.44  CrCl > 120 ml/min   WBC = 12.1  Afebrile   Indication: SSTI x 5 days (day 2 of 5 )  Vancomycin random level = 19 @ 1819   Predicted  and trough of 14.2 ==> Therapeutic ==> goal -600 and trough ~ 15   No change ==> Continue Vancomycin 1250 mg IV q12h    Pharmacy to continue to follow and adjust dose/frequency per protocol   Navin Ibarra [facility] [phone]

## 2021-11-28 NOTE — PROGRESS NOTES
Hospitalist Progress Note-critical care note     Patient: Nicole Boudreaux MRN: [id number]  CSN: [id number]    YOB: 1995  Age: 27 y.o. Sex: female    DOA: 11/25/2021 LOS:  LOS: 3 days            Chief complaint: wound infection, postpartum hemorrhage, uti     Assessment/Plan         Hospital Problems  Date Reviewed: 11/25/2021          Codes Class Noted POA    Wound infection ICD-10-CM: T14. 8XXA, L08.9  ICD-9-CM: 958.3  11/26/2021 Unknown        Postpartum hemorrhage ICD-10-CM: O72.1  ICD-9-CM: 666.10  11/25/2021 Yes        Shock ([address] ICD-10-CM: R57.9  ICD-9-CM: 785.50  11/25/2021 Yes        Sepsis ([address] ICD-10-CM: A41.9  ICD-9-CM: 038.9, 995.91  11/25/2021 Yes        Pleural effusion, bilateral ICD-10-CM: J90  ICD-9-CM: 511.9  11/25/2021 Yes        Postoperative complication XBC-90-QL: I25. 9XXA  ICD-9-CM: 998.9  11/25/2021 Yes        Fever ICD-10-CM: R50.9  ICD-9-CM: 780.60  11/25/2021 Yes        UTI (urinary tract infection) ICD-10-CM: N39.0  ICD-9-CM: 599.0  11/22/2021 Yes               Shock resolved  Received blood transfusion   Leukocytosis resolved   So far bcx still negative     Postpartum hemorrhage   Continue small amount blood clots   On methergen per primary team     Pleural effusion   Echo done recently and cardiologist saw pt   So far no sob reported       uti   On zosyn   Urine cx negative     wound infection postoperative complication   Continue zosyn -broad coverage  Wound cx HEAVY PROBABLE PSEUDOMONAS SPECIES, checking for 2nd ORGANISM from 11/27, however today reported NO PSEUDOMONAS AS PREVIOUSLY REPORTED**   Will d.c vanc     Hypokalemia   k replacement     Anemia   Received  iron transfusion   Repeated h/h no change     Subjective : my h/h drop, less discharge now     Rn:stable   Disposition :tbd,   Review of systems:    General: No fevers or chills. Cardiovascular: No chest pain or pressure. No palpitations. Pulmonary: No shortness of breath.    Gastrointestinal: No nausea, vomiting. Vital signs/Intake and Output:  Visit Vitals  /70 (BP 1 Location: Left upper arm, BP Patient Position: Lying)   Pulse 75   Temp 98.5 °F (36.9 °C)   Resp 16   Ht 5' 4\" (1.626 m)   Wt 74.8 kg (165 lb)   SpO2 98%   BMI 28.32 kg/m²     Current Shift:  No intake/output data recorded. Last three shifts:  No intake/output data recorded. Physical Exam:  General: WD, WN. Alert, cooperative, no acute distress    HEENT: NC, Atraumatic. PERRLA, anicteric sclerae. Lungs: CTA Bilaterally. No Wheezing/Rhonchi/Rales. Heart:  Regular  rhythm,  No murmur, No Rubs, No Gallops  Abdomen: Soft, Non distended, Non tender. +Bowel sounds, no discharge noted from wound, no tenderness   Extremities: No c/c/e  Psych:   Not anxious or agitated. Neurologic:  No acute neurological deficit. Labs: Results:       Chemistry Recent Labs     11/28/21 0213 11/27/21 0235 11/26/21  0338   GLU 66* 76 86    143 142   K 3.3* 3.4* 3.9   * 111 111   CO2 24 24 24   BUN 7 6* 5*   CREA 0.48* 0.44* 0.59*   CA 7.9* 7.8* 8.1*   AGAP 7 8 7   BUCR 15 14 8*   AP 76 86 91   TP 4.7* 4.7* 4.9*   ALB 1.7* 1.7* 1.8*   GLOB 3.0 3.0 3.1   AGRAT 0.6* 0.6* 0.6*      CBC w/Diff Recent Labs     11/28/21  0213 11/27/21  1819 11/27/21  1100 11/26/21  1050 11/26/21  0338 11/25/21  1925 11/25/21  1249   WBC 12.3 10.7 12.1   < > 19.2*   < > 22.8*   RBC 2.29* 2.50* 2.62*   < > 2.79*   < > 2.82*   HGB 6.7* 7.3* 7.7*   < > 8.1*   < > 8.4*   HCT 21.0* 22.9* 23.9*   < > 25.2*   < > 25.3*    381 355   < > 343   < > 310   GRANS  --   --   --   --  77*  --  78*   LYMPH  --   --   --   --  11*  --  9*   EOS  --   --   --   --  0  --  0    < > = values in this interval not displayed. Cardiac Enzymes No results for input(s): CPK, CKND1, MARSHALL in the last 72 hours.     No lab exists for component: CKRMB, TROIP   Coagulation Recent Labs     11/27/21  0235 11/26/21  0338   PTP 14.8 15.6*   INR 1.2 1.3*   APTT  --  33.8       Lipid Panel No results found for: CHOL, CHOLPOCT, CHOLX, CHLST, CHOLV, 952338, HDL, HDLP, LDL, LDLC, DLDLP, 288329, VLDLC, VLDL, TGLX, TRIGL, TRIGP, TGLPOCT, CHHD, CHHDX   BNP No results for input(s): BNPP in the last 72 hours. Liver Enzymes Recent Labs     21  0213   TP 4.7*   ALB 1.7*   AP 76      Thyroid Studies No results found for: T4, T3U, TSH, TSHEXT, TSHEXT     Procedures/imaging: see electronic medical records for all procedures/Xrays and details which were not copied into this note but were reviewed prior to creation of Plan    CTA CHEST ABD PELV W WO CONT    Result Date: 2021  HISTORY: -From Provider: postpartum fever/ dyspnea/ c section a -Additional: One-week postop . Chills. Fever. Passing blood clots vaginally this exam. Technique: Axial imaging was obtained dynamically through the pre-and post contrast aorta using high-resolution CT angiographic protocol, without complications. In addition, coronal and sagittal reconstructed MIP arteriograms were performed, to better evaluate the aorta, and to increase sensitivity for detection of aortic dissection and aneurysm. Dose reduction techniques used: automated exposure control, adjustment of the mAs and/or kVp according to patient size, and iterative reconstruction techniques. Comparison study: . Findings: Cardiovascular: Aorta: Normal in caliber, without evidence of aneurysm or dissection]. Pulmonary arteries: opacified completely, without evidence of filling defects to suggest pulmonary thromboembolism. Please note, the study was performed with optimal contrast bolus timing to the aorta is not optimal for assessment of pulmonary emboli. Coronary arteries: Unremarkable. Heart: The heart is not enlarged. There is a small pericardial effusion. . ] Abdominal visceral arteries: Celiac, SMA, bilateral renal arteries, VALERIA without significant origin disease/calcification. Iliac arteries: unremarkable. CT CHEST: Pulmonary parenchyma:  There is septal interstitial thickening. There is mild respiratory motion degradation limiting evaluation of the lung parenchyma. Mild right and minimal left dependent atelectasis/consolidation. Lymph nodes: There is no significant axillary, hilar, or mediastinal lymphadenopathy. Pleura: Moderate right and small left pleural effusions. Chest wall/lower neck soft tissues: CT ABDOMEN: Liver : Unremarkable, without focal lesions. Vascular enhancement: Portal, splenic vein, SMV are unremarkable Spleen: Unremarkable, without focal lesions. Adrenal glands: Unremarkable, without focal lesions. Pancreas: Unremarkable, without focal lesions. Stomach/duodenum: Unremarkable. Gallbladder: No calcified gallstones or surrounding inflammatory changes identified. Biliary system: Unremarkable. Retroperitoneum: Kidneys: No hydronephrosis or renal calculi. No focal lesions. IVC: Enlarged, not opacified at phase of contrast obtained. . Lymph nodes:  No significantly enlarged lymph nodes. Intraperitoneal free air:None seen. Intraperitoneal free fluid: Small amount of hypodense fluid about the gallbladder, liver and in the cul-de-sac. No hyperdense hemorrhage. CT PELVIS: Bowel : Unremarkable. Appendix: Normal as seen. No localized right lower quadrant or pericecal inflammatory changes. Bladder: Mild bladder wall thickening 1in the underdistended state. [No bladder stones are seen.] Other:  Enlarged postpartum uterus, containing internal gas. Prominent enhancing uterine and parametrial vessels. There is stranding of the anterior abdominal wall fat. There are foci of cutaneous gas in the low pelvis superficial to the anterior abdominal wall. There is hypodense thickening/edema about the umbilicus. Osseous structures: Unremarkable ]     1. Prominent enhancing uterine and parametrial vessels, with gas in the enlarged uterus.  This is nonspecific and the postpartum/post  state, and can be normal. CT cannot exclude endometritis if clinically suspected. Contains gas in the anterior abdominal wall superficial to the musculature, additionally nonspecific and can be postoperative related to phlegmon/infection. No circumscribed, drainable collections are seen. 2. Bilateral pleural effusions and septal interstitial thickening, which can be seen in setting of fluid overload/pulmonary edema. Enlarged IVC additionally which can be seen in the setting of overhydration. Bibasilar dependent atelectasis or consolidation. 3. Intraperitoneal free fluid, and fluid about the distended gallbladder, without calcified gallstones. This is nonspecific and may be related to recent postoperative state If clinical findings suggest cholecystitis, then dedicated gallbladder ultrasound could best further assess. 4. Mild bladder wall thickening, which can be seen in the setting of underdistention or superimposed infection/cystitis, best correlated with clinical and laboratory/urinalysis findings. US ABD LTD    Result Date: 11/23/2021  EXAM: Limited right upper quadrant abdominal ultrasound INDICATION: Right upper quadrant pain. COMPARISON: CT one day prior. TECHNIQUE: Real-time abdomen/right upper quadrant sonography in multiple planes was performed with image documentation. Grayscale, color flow Doppler imaging, and velocity spectral waveform analysis of the portal vein was performed (duplex imaging). _______________ FINDINGS: LIVER: Normal in echotexture. No focal mass Color flow Doppler and velocity spectral waveform analysis of the portal vein shows normal (hepatopetal) direction of flow. [place] BILIARY SYSTEM: No intrahepatic biliary dilatation. Common bile duct is normal in caliber measuring 0.6 cm. GALLBLADDER: No gallstones or gallbladder wall thickening. No pericholecystic fluid. Small polyp measuring 2 mm. RIGHT KIDNEY: 10.5 cm in length. No hydronephrosis or renal mass. No visible calculi.  PANCREAS: Head and body are unremarkable in appearance though the tail is obscured by overlying bowel gas. IVC: Visualized portions are unremarkable in appearance. OTHER: Trace perihepatic fluid. Small right effusion. _______________     No gallstones or secondary findings of cholecystitis. Trace perihepatic fluid. Small right effusion. XR CHEST PORT    Result Date: 11/25/2021  EXAM: CHEST RADIOGRAPH, SINGLE VIEW CLINICAL INDICATION/HISTORY: hypoxia      <Additional:  None. COMPARISON: None. TECHNIQUE: Portable frontal view of the chest was obtained. _______________ FINDINGS: SUPPORT DEVICES: None. HEART AND MEDIASTINUM: Cardiomediastinal silhouette appears within normal limits. LUNGS AND PLEURAL SPACES: Pulmonary vessels are normal.  Lungs are clear. Costophrenic angles are sharp. No pneumothorax. BONY THORAX AND SOFT TISSUES: No acute osseous abnormality. _______________     No active cardiopulmonary disease. ECHO ADULT COMPLETE    Result Date: 11/22/2021  · LV: Estimated LVEF is 50 - 55%. Normal cavity size, wall thickness and diastolic function. Low normal systolic function. E/E'= 9.64. · MV: Moderate mitral valve regurgitation is present. · TV: Mild to moderate tricuspid valve regurgitation is present. · PV: Mild pulmonic valve regurgitation is present. · PA: Pulmonary arterial systolic pressure is 34 mmHg. DUPLEX LOWER EXT VENOUS BILAT    Result Date: 11/22/2021  · No evidence of deep vein thrombosis in the right lower extremity. · No evidence of deep vein thrombosis in the left lower extremity.         Dipak Johnston MD

## 2021-11-28 NOTE — PROGRESS NOTES
Obstetrics & Gynecology    Subjective:     Erna Mark is a 27 y.o.  who presents with Postpartum hemorrhage [O72.1]. Complaints minimal tenderness right aspect of incision relieved with ibuprofen. No n/v/f/c. Lochia is light to moderate  C/w period flow per pt. She is ambulating without difficulty. Tolerating po intake. Current Facility-Administered Medications   Medication Dose Route Frequency    potassium chloride (K-DUR, KLOR-CON M20) SR tablet 40 mEq  40 mEq Oral BID    ibuprofen (MOTRIN) tablet 800 mg  800 mg Oral TID    Vancomycin - Pharmacy to Dose  1 Each Other Rx Dosing/Monitoring    piperacillin-tazobactam (ZOSYN) 3.375 g in 0.9% sodium chloride (MBP/ADV) 100 mL MBP  3.375 g IntraVENous Q6H    acetaminophen (TYLENOL) tablet 650 mg  650 mg Oral Q6H    vancomycin (VANCOCIN) 1250 mg in  ml infusion  1,250 mg IntraVENous Q12H    0.9% sodium chloride infusion 250 mL  250 mL IntraVENous PRN    sodium chloride (NS) flush 5-40 mL  5-40 mL IntraVENous Q8H    famotidine (PF) (PEPCID) 20 mg in 0.9% sodium chloride 10 mL injection  20 mg IntraVENous BID    sodium chloride (NS) flush 5-40 mL  5-40 mL IntraVENous PRN    polyethylene glycol (MIRALAX) packet 17 g  17 g Oral DAILY PRN    ondansetron (ZOFRAN ODT) tablet 4 mg  4 mg Oral Q8H PRN    Or    ondansetron (ZOFRAN) injection 4 mg  4 mg IntraVENous Q6H PRN    0.9% sodium chloride infusion 250 mL  250 mL IntraVENous PRN       No intake/output data recorded. No intake/output data recorded.     Recent Results (from the past 24 hour(s))   CBC W/O DIFF    Collection Time: 11/27/21 11:00 AM   Result Value Ref Range    WBC 12.1 4.6 - 13.2 K/uL    RBC 2.62 (L) 4.20 - 5.30 M/uL    HGB 7.7 (L) 12.0 - 16.0 g/dL    HCT 23.9 (L) 35.0 - 45.0 %    MCV 91.2 78.0 - 100.0 FL    MCH 29.4 24.0 - 34.0 PG    MCHC 32.2 31.0 - 37.0 g/dL    RDW 15.1 (H) 11.6 - 14.5 %    PLATELET 412 014 - 734 K/uL    MPV 8.8 (L) 9.2 - 11.8 FL    NRBC 0.2 (H) 0  WBC    ABSOLUTE NRBC 0.02 (H) 0.00 - 0.01 K/uL   CBC W/O DIFF    Collection Time: 11/27/21  6:19 PM   Result Value Ref Range    WBC 10.7 4.6 - 13.2 K/uL    RBC 2.50 (L) 4.20 - 5.30 M/uL    HGB 7.3 (L) 12.0 - 16.0 g/dL    HCT 22.9 (L) 35.0 - 45.0 %    MCV 91.6 78.0 - 100.0 FL    MCH 29.2 24.0 - 34.0 PG    MCHC 31.9 31.0 - 37.0 g/dL    RDW 15.0 (H) 11.6 - 14.5 %    PLATELET 436 074 - 974 K/uL    MPV 8.9 (L) 9.2 - 11.8 FL    NRBC 0.4 (H) 0  WBC    ABSOLUTE NRBC 0.04 (H) 0.00 - 0.01 K/uL   VANCOMYCIN, RANDOM    Collection Time: 11/27/21  6:19 PM   Result Value Ref Range    Vancomycin, random 19.0 5.0 - 09.9 UG/ML   METABOLIC PANEL, COMPREHENSIVE    Collection Time: 11/28/21  2:13 AM   Result Value Ref Range    Sodium 144 136 - 145 mmol/L    Potassium 3.3 (L) 3.5 - 5.5 mmol/L    Chloride 113 (H) 100 - 111 mmol/L    CO2 24 21 - 32 mmol/L    Anion gap 7 3.0 - 18 mmol/L    Glucose 66 (L) 74 - 99 mg/dL    BUN 7 7.0 - 18 MG/DL    Creatinine 0.48 (L) 0.6 - 1.3 MG/DL    BUN/Creatinine ratio 15 12 - 20      GFR est AA >60 >60 ml/min/1.73m2    GFR est non-AA >60 >60 ml/min/1.73m2    Calcium 7.9 (L) 8.5 - 10.1 MG/DL    Bilirubin, total 0.2 0.2 - 1.0 MG/DL    ALT (SGPT) 19 13 - 56 U/L    AST (SGOT) 9 (L) 10 - 38 U/L    Alk.  phosphatase 76 45 - 117 U/L    Protein, total 4.7 (L) 6.4 - 8.2 g/dL    Albumin 1.7 (L) 3.4 - 5.0 g/dL    Globulin 3.0 2.0 - 4.0 g/dL    A-G Ratio 0.6 (L) 0.8 - 1.7     CBC W/O DIFF    Collection Time: 11/28/21  2:13 AM   Result Value Ref Range    WBC 12.3 4.6 - 13.2 K/uL    RBC 2.29 (L) 4.20 - 5.30 M/uL    HGB 6.7 (L) 12.0 - 16.0 g/dL    HCT 21.0 (L) 35.0 - 45.0 %    MCV 91.7 78.0 - 100.0 FL    MCH 29.3 24.0 - 34.0 PG    MCHC 31.9 31.0 - 37.0 g/dL    RDW 15.0 (H) 11.6 - 14.5 %    PLATELET 624 708 - 723 K/uL    MPV 8.8 (L) 9.2 - 11.8 FL    NRBC 0.2 (H) 0  WBC    ABSOLUTE NRBC 0.03 (H) 0.00 - 0.01 K/uL   HGB & HCT    Collection Time: 11/28/21  8:50 AM   Result Value Ref Range    HGB 11.8 (L) 12.0 - 16.0 g/dL    HCT 38.6 35.0 - 45.0 %         Review of Systems  A comprehensive review of systems was negative except for that written in the HPI. .    Objective:     Blood pressure 115/76, pulse 78, temperature 98.7 °F (37.1 °C), resp. rate 16, height 5' 4\" (1.626 m), weight 74.8 kg (165 lb), SpO2 99 %. Temp (24hrs), Av.5 °F (36.9 °C), Min:98.2 °F (36.8 °C), Max:98.7 °F (37.1 °C)      Physical Exam:   Lungs: clear to auscultation bilaterally, Cardiac: regular rate and rhythm, S1, S2 normal, no murmur, click, rub or gallop, Abdomen: soft, non-tender. Bowel sounds normal. No masses,  no organomegaly and Extremities: extremities normal, atraumatic, no cyanosis or edema   Incision c/d/i with minimal induration superiorly and no erythema   Assessment:       Anemia:    Recent Labs     21  0850   HCT 38.6   HGB 11.8*             Other Medical Problems      Active Problems:    UTI (urinary tract infection) (2021)      Postpartum hemorrhage (2021)      Shock ([address] (2021)      Sepsis ([address] (2021)      Pleural effusion, bilateral (2021)      Postoperative complication ()      Fever (2021)      Wound infection (2021)           Plan:     PPH - s/p transfusion PRBC at admission and Venofer yesterday. Bleeding is appropriate at this time for ppd # 14. Continue oral iron therapy. Uterine Pseudoaneurysm - d/w Dr. Isaac See with Interventional Radiology and he does not recommend embolization at this time given clinical picture (hemorrhage resolved) and possible complicating diagnoses (endometritis). Plan re-imaging as outpatient in 2 weeks. Anemia - s/p PRBC transfusion and Venofer infusion yesterday. She is not symptomatic at this time. Plan outpatient oral iron therapy and repeat hgb in 2 weeks. Endometritis/Wound infection - clinically improving. Will arrange for home health for IV outpatient therapy to complete 14 days of treatment with Zosyn.      Thomas Strange Silvano Collet, M.D.

## 2021-11-28 NOTE — PROGRESS NOTES
1910: Bedside and Verbal shift change report given to THANH Chavez, RN (oncoming nurse) by Arben Canales. Yareli Nuñez RN (offgoing nurse). Report included the following information SBAR, Kardex, Procedure Summary, Intake/Output, MAR and Recent Results. 0600: Pt's H&H came back at 6.7/21.0. K:3.3. Called SUKHJINDER Zambrano to update on pt's status. No orders received at this time. States she will look further into it. 0710: Bedside and Verbal shift change report given to CHRISTELLE Nuñez RN (oncoming nurse) by THANH Chavez RN (offgoing nurse). Report included the following information SBAR, Kardex, Procedure Summary, Intake/Output, MAR and Recent Results.

## 2021-11-28 NOTE — PROGRESS NOTES
Physician Progress Note      Clayton Delgado  [facility] #:                  [id number]  :                       1995  ADMIT DATE:       2021 5:17 AM  DISCH DATE:  RESPONDING  PROVIDER #:        Dinorah MOSER MD          QUERY TEXT:    Pt admitted with postpartum hemorrhage and has anemia documented. If possible, please document in progress notes and discharge summary further specificity regarding the acuity and type of anemia:    The medical record reflects the following:    Risk Factors: recent     Clinical Indicators:  > Per  ED provider-Still being actively resuscitated for hemorrhage  Arrived to the ED today after having gushing of blood  > HGB / 8.0, 8.4, 8.6,  8.1, 8.2    Treatment: receiving IV fluids, rectal Cytotec, Pitocin, emergency release blood was ordered and she was given 2 units, TXA, and labs were sent. Thank you,  Ellyn Salazar RN, BSN, [place], [address] [facility]  Options provided:  -- Anemia due to acute blood loss  -- Acute on chronic blood loss anemia  -- Anemia due to postoperative blood loss  -- Other - I will add my own diagnosis  -- Disagree - Not applicable / Not valid  -- Disagree - Clinically unable to determine / Unknown  -- Refer to Clinical Documentation Reviewer    PROVIDER RESPONSE TEXT:    This patient has acute blood loss anemia. Query created by: Nanci Jha on 2021 12:36 PM      QUERY TEXT:    Pt admitted with Postpartum hemorrhage and has shock documented. If possible, please document in progress notes and discharge summary further specificity regarding the type of shock:     The medical record reflects the following:    Risk Factors: Postpartum hemorrhage, sepsis, Postoperative complication    Clinical Indicators:  > Per  ED provider-Still being actively resuscitated for hemorrhage  Arrived to the ED today after having gushing of blood  > HGB / 8.0, 8.4, 8.6,  8.1, 8.2  > Per ED provider- \"Is still being actively resuscitated for hemorrhage and now suspected septic shock. \"  >11/25/2021 05:23 WBC: 17.8 (H, WBC: 22.8 (H), WBC: 23.2 (H), 11/26/2021 03:38 WBC: 19.2 (H), WBC: 19.4 (H)  > ED Exqu712.7, 102.3, 102, 101.2  > , 91, 93, 95, 98    Treatment: Received blood transfusion, IV NS, Zosyn, Vancomycin    Thank you,  Edd Palumbo, RN, BSN, [facility], [address] [facility]  Options provided:  -- Septic Shock  -- Hypovolemic Shock  -- Hemorrhagic Shock  -- Other - I will add my own diagnosis  -- Disagree - Not applicable / Not valid  -- Disagree - Clinically unable to determine / Unknown  -- Refer to Clinical Documentation Reviewer    PROVIDER RESPONSE TEXT:    This patient is in hemorrhagic shock.     Query created by: Abena Cherry on 11/26/2021 12:49 PM      Electronically signed by:  Sofie MOSER MD 11/28/2021 11:00 AM

## 2021-11-28 NOTE — PROGRESS NOTES
0710 Bedside and Verbal shift change report given to CHRISTELLE Snow RN (oncoming nurse) by THANH Sinha RN (offgoing nurse). Report included the following information SBAR, Kardex, OR Summary, Procedure Summary, Intake/Output, MAR and Recent Results. 0809 shift assessment complete and VS obtained    0810 scheduled meds given, IV leaking    0825 L&D called to placed new IV in pt as previous had started leaking    0917 antibiotic infusing, pt refused motrin. No needs or concerns at this time    0928 this nurse called lab to check if stat H&H was obtained, they confirmed it had been drawn    1005 patient unhooked from IV, no needs or concerns at this time    1155 pain medication given    1200 antibiotic infusing, no needs or concerns at this time    1418 patient disconnected from IV pump, no needs at this time    1505 tylenol given and antibiotics hung    1507 reassessment complete and VS obtained, pt has no needs at this time    1550 patient disconnected from IV    1630 patient given new pump parts and pads    1815 patient has no needs or concerns at this time    1910 Bedside and Verbal shift change report given to Harry Celis RN (oncoming nurse) by Kyler Serrano. Angi Murrieta RN (offgoing nurse). Report included the following information SBAR, Kardex, OR Summary, Procedure Summary, Intake/Output, MAR and Recent Results.

## 2021-11-28 NOTE — PROGRESS NOTES
Problem: Falls - Risk of  Goal: *Absence of Falls  Description: Document Holland Fall Risk and appropriate interventions in the flowsheet.   Outcome: Progressing Towards Goal  Note: Fall Risk Interventions:  Mobility Interventions: Assess mobility with egress test, Communicate number of staff needed for ambulation/transfer, Patient to call before getting OOB         Medication Interventions: Teach patient to arise slowly    Elimination Interventions: Call light in reach, Patient to call for help with toileting needs, Toilet paper/wipes in reach, Toileting schedule/hourly rounds              Problem: Patient Education: Go to Patient Education Activity  Goal: Patient/Family Education  Outcome: Progressing Towards Goal     Problem: Pain  Goal: *Control of Pain  Outcome: Progressing Towards Goal     Problem: Patient Education: Go to Patient Education Activity  Goal: Patient/Family Education  Outcome: Progressing Towards Goal

## 2021-11-29 ENCOUNTER — APPOINTMENT (OUTPATIENT)
Dept: INTERVENTIONAL RADIOLOGY/VASCULAR | Age: 26
DRG: 776 | End: 2021-11-29
Attending: OBSTETRICS & GYNECOLOGY
Payer: COMMERCIAL

## 2021-11-29 ENCOUNTER — HOSPITAL ENCOUNTER (INPATIENT)
Age: 26
LOS: 2 days | Discharge: HOME | DRG: 776 | End: 2021-12-01
Attending: EMERGENCY MEDICINE | Admitting: OBSTETRICS & GYNECOLOGY
Payer: COMMERCIAL

## 2021-11-29 VITALS
HEART RATE: 77 BPM | DIASTOLIC BLOOD PRESSURE: 76 MMHG | TEMPERATURE: 98.4 F | BODY MASS INDEX: 28.17 KG/M2 | HEIGHT: 64 IN | RESPIRATION RATE: 17 BRPM | SYSTOLIC BLOOD PRESSURE: 113 MMHG | OXYGEN SATURATION: 100 % | WEIGHT: 165 LBS

## 2021-11-29 PROBLEM — D64.9 SEVERE ANEMIA: Status: ACTIVE | Noted: 2021-11-29

## 2021-11-29 LAB
ALBUMIN SERPL-MCNC: 2 G/DL (ref 3.4–5)
ALBUMIN/GLOB SERPL: 0.6 {RATIO} (ref 0.8–1.7)
ALP SERPL-CCNC: 79 U/L (ref 45–117)
ALT SERPL-CCNC: 18 U/L (ref 13–56)
ANION GAP SERPL CALC-SCNC: 5 MMOL/L (ref 3–18)
ANION GAP SERPL CALC-SCNC: 6 MMOL/L (ref 3–18)
APTT PPP: 47.8 SEC (ref 23–36.4)
AST SERPL-CCNC: 11 U/L (ref 10–38)
BASOPHILS # BLD: 0 K/UL (ref 0–0.1)
BASOPHILS # BLD: 0.1 K/UL (ref 0–0.1)
BASOPHILS NFR BLD: 0 % (ref 0–2)
BASOPHILS NFR BLD: 0 % (ref 0–2)
BILIRUB SERPL-MCNC: 0.1 MG/DL (ref 0.2–1)
BUN SERPL-MCNC: 10 MG/DL (ref 7–18)
BUN SERPL-MCNC: 7 MG/DL (ref 7–18)
BUN/CREAT SERPL: 10 (ref 12–20)
BUN/CREAT SERPL: 16 (ref 12–20)
CALCIUM SERPL-MCNC: 8.1 MG/DL (ref 8.5–10.1)
CALCIUM SERPL-MCNC: 8.2 MG/DL (ref 8.5–10.1)
CHLORIDE SERPL-SCNC: 110 MMOL/L (ref 100–111)
CHLORIDE SERPL-SCNC: 111 MMOL/L (ref 100–111)
CO2 SERPL-SCNC: 25 MMOL/L (ref 21–32)
CO2 SERPL-SCNC: 26 MMOL/L (ref 21–32)
CREAT SERPL-MCNC: 0.64 MG/DL (ref 0.6–1.3)
CREAT SERPL-MCNC: 0.67 MG/DL (ref 0.6–1.3)
D DIMER PPP FEU-MCNC: 10.13 UG/ML(FEU)
DIFFERENTIAL METHOD BLD: ABNORMAL
DIFFERENTIAL METHOD BLD: ABNORMAL
EOSINOPHIL # BLD: 0.1 K/UL (ref 0–0.4)
EOSINOPHIL # BLD: 0.1 K/UL (ref 0–0.4)
EOSINOPHIL NFR BLD: 1 % (ref 0–5)
EOSINOPHIL NFR BLD: 1 % (ref 0–5)
ERYTHROCYTE [DISTWIDTH] IN BLOOD BY AUTOMATED COUNT: 14.8 % (ref 11.6–14.5)
ERYTHROCYTE [DISTWIDTH] IN BLOOD BY AUTOMATED COUNT: 15.1 % (ref 11.6–14.5)
GLOBULIN SER CALC-MCNC: 3.4 G/DL (ref 2–4)
GLUCOSE SERPL-MCNC: 120 MG/DL (ref 74–99)
GLUCOSE SERPL-MCNC: 80 MG/DL (ref 74–99)
HCT VFR BLD AUTO: 19.7 % (ref 35–45)
HCT VFR BLD AUTO: 23.9 % (ref 35–45)
HGB BLD-MCNC: 6.1 G/DL (ref 12–16)
HGB BLD-MCNC: 7.3 G/DL (ref 12–16)
HISTORY CHECKED?,CKHIST: NORMAL
IMM GRANULOCYTES # BLD AUTO: 0 K/UL
IMM GRANULOCYTES # BLD AUTO: 0.9 K/UL (ref 0–0.04)
IMM GRANULOCYTES NFR BLD AUTO: 0 %
IMM GRANULOCYTES NFR BLD AUTO: 5 % (ref 0–0.5)
INR PPP: 1.2 (ref 0.8–1.2)
LYMPHOCYTES # BLD: 2.2 K/UL (ref 0.9–3.6)
LYMPHOCYTES # BLD: 2.3 K/UL (ref 0.9–3.6)
LYMPHOCYTES NFR BLD: 14 % (ref 21–52)
LYMPHOCYTES NFR BLD: 18 % (ref 21–52)
MAGNESIUM SERPL-MCNC: 1.6 MG/DL (ref 1.6–2.6)
MCH RBC QN AUTO: 29.2 PG (ref 24–34)
MCH RBC QN AUTO: 29.3 PG (ref 24–34)
MCHC RBC AUTO-ENTMCNC: 30.5 G/DL (ref 31–37)
MCHC RBC AUTO-ENTMCNC: 31 G/DL (ref 31–37)
MCV RBC AUTO: 94.3 FL (ref 78–100)
MCV RBC AUTO: 96 FL (ref 78–100)
MONOCYTES # BLD: 0.2 K/UL (ref 0.05–1.2)
MONOCYTES # BLD: 0.7 K/UL (ref 0.05–1.2)
MONOCYTES NFR BLD: 2 % (ref 3–10)
MONOCYTES NFR BLD: 4 % (ref 3–10)
MYELOCYTES NFR BLD MANUAL: 3 %
NEUTS SEG # BLD: 12.9 K/UL (ref 1.8–8)
NEUTS SEG # BLD: 9.1 K/UL (ref 1.8–8)
NEUTS SEG NFR BLD: 76 % (ref 40–73)
NEUTS SEG NFR BLD: 76 % (ref 40–73)
NRBC # BLD: 0.03 K/UL (ref 0–0.01)
NRBC # BLD: 0.08 K/UL (ref 0–0.01)
NRBC BLD-RTO: 0.2 PER 100 WBC
NRBC BLD-RTO: 0.5 PER 100 WBC
PLATELET # BLD AUTO: 510 K/UL (ref 135–420)
PLATELET # BLD AUTO: 515 K/UL (ref 135–420)
PLATELET COMMENTS,PCOM: ABNORMAL
PMV BLD AUTO: 8.6 FL (ref 9.2–11.8)
PMV BLD AUTO: 8.6 FL (ref 9.2–11.8)
POTASSIUM SERPL-SCNC: 3.1 MMOL/L (ref 3.5–5.5)
POTASSIUM SERPL-SCNC: 4.4 MMOL/L (ref 3.5–5.5)
PROT SERPL-MCNC: 5.4 G/DL (ref 6.4–8.2)
PROTHROMBIN TIME: 14.8 SEC (ref 11.5–15.2)
RBC # BLD AUTO: 2.09 M/UL (ref 4.2–5.3)
RBC # BLD AUTO: 2.49 M/UL (ref 4.2–5.3)
RBC MORPH BLD: ABNORMAL
SODIUM SERPL-SCNC: 141 MMOL/L (ref 136–145)
SODIUM SERPL-SCNC: 142 MMOL/L (ref 136–145)
WBC # BLD AUTO: 12 K/UL (ref 4.6–13.2)
WBC # BLD AUTO: 16.9 K/UL (ref 4.6–13.2)

## 2021-11-29 PROCEDURE — 36415 COLL VENOUS BLD VENIPUNCTURE: CPT

## 2021-11-29 PROCEDURE — 80053 COMPREHEN METABOLIC PANEL: CPT

## 2021-11-29 PROCEDURE — 93005 ELECTROCARDIOGRAM TRACING: CPT

## 2021-11-29 PROCEDURE — 36573 INSJ PICC RS&I 5 YR+: CPT

## 2021-11-29 PROCEDURE — 83735 ASSAY OF MAGNESIUM: CPT

## 2021-11-29 PROCEDURE — 74011250636 HC RX REV CODE- 250/636: Performed by: EMERGENCY MEDICINE

## 2021-11-29 PROCEDURE — 74011250636 HC RX REV CODE- 250/636: Performed by: OBSTETRICS & GYNECOLOGY

## 2021-11-29 PROCEDURE — 74011000250 HC RX REV CODE- 250: Performed by: FAMILY MEDICINE

## 2021-11-29 PROCEDURE — 86923 COMPATIBILITY TEST ELECTRIC: CPT

## 2021-11-29 PROCEDURE — 02HV33Z INSERTION OF INFUSION DEVICE INTO SUPERIOR VENA CAVA, PERCUTANEOUS APPROACH: ICD-10-PCS | Performed by: PHYSICIAN ASSISTANT

## 2021-11-29 PROCEDURE — 74011000258 HC RX REV CODE- 258: Performed by: EMERGENCY MEDICINE

## 2021-11-29 PROCEDURE — 74011000258 HC RX REV CODE- 258: Performed by: HOSPITALIST

## 2021-11-29 PROCEDURE — 86901 BLOOD TYPING SEROLOGIC RH(D): CPT

## 2021-11-29 PROCEDURE — 85610 PROTHROMBIN TIME: CPT

## 2021-11-29 PROCEDURE — 65270000029 HC RM PRIVATE

## 2021-11-29 PROCEDURE — 85025 COMPLETE CBC W/AUTO DIFF WBC: CPT

## 2021-11-29 PROCEDURE — 99284 EMERGENCY DEPT VISIT MOD MDM: CPT

## 2021-11-29 PROCEDURE — 85730 THROMBOPLASTIN TIME PARTIAL: CPT

## 2021-11-29 PROCEDURE — G0378 HOSPITAL OBSERVATION PER HR: HCPCS

## 2021-11-29 PROCEDURE — 74011250636 HC RX REV CODE- 250/636: Performed by: HOSPITALIST

## 2021-11-29 PROCEDURE — 74011250636 HC RX REV CODE- 250/636: Performed by: FAMILY MEDICINE

## 2021-11-29 PROCEDURE — 94762 N-INVAS EAR/PLS OXIMTRY CONT: CPT

## 2021-11-29 PROCEDURE — 88305 TISSUE EXAM BY PATHOLOGIST: CPT

## 2021-11-29 PROCEDURE — 74011000250 HC RX REV CODE- 250: Performed by: OBSTETRICS & GYNECOLOGY

## 2021-11-29 PROCEDURE — 74011250637 HC RX REV CODE- 250/637: Performed by: ADVANCED PRACTICE MIDWIFE

## 2021-11-29 PROCEDURE — 85379 FIBRIN DEGRADATION QUANT: CPT

## 2021-11-29 RX ORDER — SODIUM CHLORIDE 9 MG/ML
250 INJECTION, SOLUTION INTRAVENOUS AS NEEDED
Status: DISCONTINUED | OUTPATIENT
Start: 2021-11-29 | End: 2021-12-01 | Stop reason: HOSPADM

## 2021-11-29 RX ORDER — SODIUM CHLORIDE 0.9 % (FLUSH) 0.9 %
5-40 SYRINGE (ML) INJECTION AS NEEDED
Status: DISCONTINUED | OUTPATIENT
Start: 2021-11-29 | End: 2021-12-01 | Stop reason: HOSPADM

## 2021-11-29 RX ORDER — HEPARIN SODIUM 200 [USP'U]/100ML
500 INJECTION, SOLUTION INTRAVENOUS
Status: CANCELLED | OUTPATIENT
Start: 2021-11-29 | End: 2021-11-29

## 2021-11-29 RX ORDER — HEPARIN SODIUM (PORCINE) LOCK FLUSH IV SOLN 100 UNIT/ML 100 UNIT/ML
500 SOLUTION INTRAVENOUS
Status: CANCELLED | OUTPATIENT
Start: 2021-11-29

## 2021-11-29 RX ORDER — IBUPROFEN 800 MG/1
800 TABLET ORAL 3 TIMES DAILY
Qty: 90 TABLET | Refills: 0 | Status: SHIPPED | OUTPATIENT
Start: 2021-11-29

## 2021-11-29 RX ORDER — HEPARIN SODIUM 200 [USP'U]/100ML
500 INJECTION, SOLUTION INTRAVENOUS
Status: COMPLETED | OUTPATIENT
Start: 2021-11-29 | End: 2021-11-29

## 2021-11-29 RX ORDER — LIDOCAINE HYDROCHLORIDE 10 MG/ML
1-20 INJECTION INFILTRATION; PERINEURAL
Status: CANCELLED | OUTPATIENT
Start: 2021-11-29 | End: 2021-11-29

## 2021-11-29 RX ORDER — METHYLERGONOVINE MALEATE 0.2 MG/1
200 TABLET ORAL EVERY 6 HOURS
Status: DISCONTINUED | OUTPATIENT
Start: 2021-11-30 | End: 2021-12-01 | Stop reason: HOSPADM

## 2021-11-29 RX ORDER — SODIUM CHLORIDE 0.9 % (FLUSH) 0.9 %
5-40 SYRINGE (ML) INJECTION EVERY 8 HOURS
Status: DISCONTINUED | OUTPATIENT
Start: 2021-11-29 | End: 2021-12-01 | Stop reason: HOSPADM

## 2021-11-29 RX ORDER — HEPARIN SODIUM (PORCINE) LOCK FLUSH IV SOLN 100 UNIT/ML 100 UNIT/ML
500 SOLUTION INTRAVENOUS AS NEEDED
Status: DISCONTINUED | OUTPATIENT
Start: 2021-11-29 | End: 2021-11-29 | Stop reason: HOSPADM

## 2021-11-29 RX ORDER — SODIUM CHLORIDE 9 MG/ML
150 INJECTION, SOLUTION INTRAVENOUS CONTINUOUS
Status: DISCONTINUED | OUTPATIENT
Start: 2021-11-29 | End: 2021-12-01 | Stop reason: HOSPADM

## 2021-11-29 RX ORDER — LIDOCAINE HYDROCHLORIDE 10 MG/ML
1-20 INJECTION INFILTRATION; PERINEURAL
Status: COMPLETED | OUTPATIENT
Start: 2021-11-29 | End: 2021-11-29

## 2021-11-29 RX ORDER — LANOLIN ALCOHOL/MO/W.PET/CERES
1 CREAM (GRAM) TOPICAL 2 TIMES DAILY WITH MEALS
Status: DISCONTINUED | OUTPATIENT
Start: 2021-11-30 | End: 2021-12-01 | Stop reason: HOSPADM

## 2021-11-29 RX ADMIN — IBUPROFEN 800 MG: 400 TABLET, FILM COATED ORAL at 09:16

## 2021-11-29 RX ADMIN — LIDOCAINE HYDROCHLORIDE 2 ML: 10 INJECTION, SOLUTION INFILTRATION; PERINEURAL at 12:12

## 2021-11-29 RX ADMIN — SODIUM CHLORIDE 150 ML/HR: 900 INJECTION, SOLUTION INTRAVENOUS at 22:39

## 2021-11-29 RX ADMIN — PIPERACILLIN AND TAZOBACTAM 3.38 G: 3; .375 INJECTION, POWDER, LYOPHILIZED, FOR SOLUTION INTRAVENOUS at 22:40

## 2021-11-29 RX ADMIN — FAMOTIDINE 20 MG: 10 INJECTION, SOLUTION INTRAVENOUS at 09:16

## 2021-11-29 RX ADMIN — ACETAMINOPHEN 650 MG: 325 TABLET ORAL at 09:16

## 2021-11-29 RX ADMIN — PIPERACILLIN AND TAZOBACTAM 3.38 G: 3; .375 INJECTION, POWDER, LYOPHILIZED, FOR SOLUTION INTRAVENOUS at 03:03

## 2021-11-29 RX ADMIN — HEPARIN SODIUM IN SODIUM CHLORIDE 1000 UNITS: 200 INJECTION INTRAVENOUS at 12:11

## 2021-11-29 RX ADMIN — ACETAMINOPHEN 650 MG: 325 TABLET ORAL at 03:08

## 2021-11-29 RX ADMIN — PIPERACILLIN AND TAZOBACTAM 3.38 G: 3; .375 INJECTION, POWDER, LYOPHILIZED, FOR SOLUTION INTRAVENOUS at 14:59

## 2021-11-29 RX ADMIN — PIPERACILLIN AND TAZOBACTAM 3.38 G: 3; .375 INJECTION, POWDER, LYOPHILIZED, FOR SOLUTION INTRAVENOUS at 09:17

## 2021-11-29 RX ADMIN — HEPARIN SODIUM (PORCINE) LOCK FLUSH IV SOLN 100 UNIT/ML 500 UNITS: 100 SOLUTION at 12:12

## 2021-11-29 RX ADMIN — ACETAMINOPHEN 650 MG: 325 TABLET ORAL at 14:56

## 2021-11-29 NOTE — PROGRESS NOTES
Spoke with patient and . States that they spoke with Linda Weinstein from [facility] home health and have completed the teaching. Linda Weinstein verified with Annalisa Mcelroy that teaching was done by them and equipment will be delivered tonight in time for 9am IV dose. Will discharge home today.       Shira Alfaro CNM

## 2021-11-29 NOTE — PROGRESS NOTES
1910 Bedside shift change report given to Rome Rosenbaum RN (oncoming nurse) by Yuki Oseguera RN (offgoing nurse). Report included the following information SBAR, Kardex, Procedure Summary, MAR and Recent Results. 2059 Pt administered scheduled medications. Pt.  AAOx4. Pain 3/10. Fundus firm at U -2, scant rubra lochia. No clots noted. Educated on signs and symptoms to report and plan of care. No further questions or concerns at this time. Callbell within reach. Bed in lowest position. 2230 Pt vitals assessed. No concerns at this time. 0138 Pt sleeping with call bell in reach. 0309 Pt administered scheduled medications. 0428 Pt sleeping with call bell in reach. 3579 Pt sleeping with call bell in reach. 8571 Bedside shift change report given to Kenzie Stone RN (oncoming nurse) by Rome Rosenbaum RN (offgoing nurse). Report included the following information SBAR, Kardex, Procedure Summary, Intake/Output, MAR and Recent Results.

## 2021-11-29 NOTE — PROGRESS NOTES
1500- Cm contacted pt to update on medication and supply co-payments, medication $8.26 per day, supplies $20 per Day pt agreeable, at that time pt informed cm that bedside nurse educated her spouse on how to give IV abx and that they we're going home today, cm at that time informed pt that Jennifer will have to be notified to see if that meets their protocol criteria, kaveh spoke with Josué from [facility] [phone] whom requested to speak with nurse who provided IV abx education to provide pump information and other details, after conversation cm contacted patients bedside nurse JUJU to inform her that [address] [place] [address] would like her to call regarding IV abx education provided to pt, at that time she decided she did not feel comfortable calling and she feels its best the pt stays overnight, cm updated pt and Alicja Button will start tomorrow, please provide pt with 9am dose prior to discharge tomorrow. patient aware. 1300-  was informed that sami can accept case, services not able to start until tomorrow AM due to staffing, cm updated bedside nurse and patient. Kaveh met with EMORY Titus on unit to discuss and complete IV ABX order form, cms will fax to [address], patients insurance not in network with [employer], kaveh also spoke with pt via phone conversation to introduce self and made aware cm will be assisting with home abx arrangements.

## 2021-11-29 NOTE — Clinical Note
Patient Class[de-identified] OBSERVATION [104]   Type of Bed: L&D [7]   Cardiac Monitoring Required?: No   Reason for Observation: post partum heomrhrage   Admitting Diagnosis: Bleeding postpartum [207904]   Admitting Diagnosis: Severe anemia [[id number]]   Admitting Physician: [facility]   Attending Physician: [facility]

## 2021-11-29 NOTE — PROCEDURES
Interventional Radiology Brief Procedure Note    Performed By:  Threasa Hammans, PA-C    Attending Radiologist: Amado Young MD    Pre-operative Diagnosis:  Need for long term IV antibiotics    Post-operative Diagnosis: Same as pre-op diagnosis    Procedure(s) Performed:  Ultrasound & fluoroscopic guided PICC line placement    Anesthesia:  Local      Findings:  Successful right arm dual lumen 5F PICC line placement. Please see dictated report for details. Complications: None immediate    Estimated Blood Loss:  Minimal    Specimens: None    Condition: Stable    Disposition:  Return to nursing unit. PICC line is ready for immediate use.        Threasa Hammans, PA-C  [facility]  Vascular & Interventional Radiology  11/29/2021

## 2021-11-29 NOTE — DISCHARGE INSTRUCTIONS
POST DELIVERY DISCHARGE INSTRUCTIONS    Name: Joe Claire  YOB: 1995  Primary Diagnosis: Active Problems:    UTI (urinary tract infection) (2021)      Postpartum hemorrhage (2021)      Shock ([address] (2021)      Sepsis ([facility] [address] (2021)      Pleural effusion, bilateral (2021)      Postoperative complication ()      Fever (2021)      Wound infection (2021)      Anemia due to acute blood loss (2021)      General:     Diet/Diet Restrictions:  Eight 8-ounce glasses of fluid daily (water, juices); avoid excessive caffeine intake. Meals/snacks as desired which are high in fiber and carbohydrates and low in fat and cholesterol. Physical Activity / Restrictions / Safety:     Avoid heavy lifting, no more that 8 lbs. For 2-3 weeks; limit use of stairs to 2 times daily for the first week home. No driving for one week. Avoid intercourse 4-6 weeks, no douching or tampon use. Check with obstetrician before starting or resuming an exercise program.       Discharge Instructions/Special Treatment/Home Care Needs:     Continue prenatal vitamins. Continue to use squirt bottle with warm water on your episiotomy after each bathroom use until bleeding stops. I  Call your doctor for the following:     Fever over 101 degrees by mouth. Vaginal bleeding heavier than a normal menstrual period or clot larger than a golf ball. Red streaks or increased swelling of legs, painful red streaks on your breast.  Painful urination, constipation and increased pain or swelling or discharge with your incision. If you feel extremely anxious or overwhelmed. If you have thoughts of harming yourself and/or your baby. Pain Management:     Pain Management:   Take Acetaminophen (Tylenol) or Ibuprofen (Advil, Motrin), as directed for pain. Use a warm Sitz bath 3 times daily to relieve episiotomy or hemorrhoidal discomfort. Heating pad to  incision as needed.  For hemorrhoidal discomfort, use Tucks and Anusol cream as needed and directed. Peripherally Inserted Central Catheter Discharge Instructions       1. Dual Lumen Peripherally Inserted Central Catheter Line inserted in right basilic. 5FR       Length: 45       Catheter is inserted in the right basilic. 2.  May use Peripherally Inserted Central Catheter Line for IV infusions and blood sampling. 3.  Flush Peripherally Inserted Central Catheter Line with 10 ml Normal Saline every 8 hours while in the hospital and before and after every use. 4.  Use 10 ml or larger syringe to flush. 5.   May use TPA 0.5mg/2 ml PRN for clotted Peripherally Inserted Central Catheter Line. This can be obtained from the Pharmacy. 6.  Please notify Interventional Radiology with any questions or concerns ar extension [phone]. 7.  Change dressing per Vidal Protocol. 8.  Give outpatient a copy of discharge instructions. [facility]  Interventional Radiology                          Follow-Up Care: These are general instructions for a healthy lifestyle:    No smoking/ No tobacco products/ Avoid exposure to second hand smoke    Surgeon General's Warning:  Quitting smoking now greatly reduces serious risk to your health. Obesity, smoking, and sedentary lifestyle greatly increases your risk for illness    A healthy diet, regular physical exercise & weight monitoring are important for maintaining a healthy lifestyle    Recognize signs and symptoms of STROKE:    F-face looks uneven    A-arms unable to move or move unevenly    S-speech slurred or non-existent    T-time-call 911 as soon as signs and symptoms begin-DO NOT go       Back to bed or wait to see if you get better-TIME IS BRAIN.

## 2021-11-29 NOTE — DISCHARGE SUMMARY
Gynecology Progress Note    Name: Christo Miller MRN: [id number] SSN: [id number]    YOB: 1995  Age: 27 y.o. Sex: female       Subjective:      Patient is feeling much better. Denies any F/Ch/N/V/SOB/CP. Appetite improved. No new c/o. Desires d/c home. History reviewed. No pertinent past medical history. Past Surgical History:   Procedure Laterality Date    HX  SECTION      HX  SECTION       OB History    No obstetric history on file. No Known Allergies  Prior to Admission medications    Medication Sig Start Date End Date Taking? Authorizing Provider   ferrous sulfate 325 mg (65 mg iron) tablet Take 1 Tablet by mouth two (2) times daily (with meals). 21   Marisol Ordoñez MD   furosemide (Lasix) 20 mg tablet Take 1 Tablet by mouth daily for 5 days. 21  Marisol Ordoñez MD   ibuprofen (MOTRIN) 800 mg tablet Take 1 Tablet by mouth every eight (8) hours as needed for Pain. Indications: pain 21   Marisol Ordoñez MD   amoxicillin (AMOXIL) 250 mg capsule Take 1 Capsule by mouth three (3) times daily for 5 days. 21  Marisol Ordoñez MD   cephALEXin (Keflex) 500 mg capsule Take 1 Capsule by mouth four (4) times daily for 10 days. 21  Gracie Montejo MD      History reviewed. No pertinent family history.   Social History     Socioeconomic History    Marital status:      Spouse name: Not on file    Number of children: Not on file    Years of education: Not on file    Highest education level: Not on file   Occupational History    Not on file   Tobacco Use    Smoking status: Never Smoker    Smokeless tobacco: Not on file   Substance and Sexual Activity    Alcohol use: Not Currently    Drug use: Never    Sexual activity: Not on file   Other Topics Concern    Not on file   Social History Narrative    Not on file     Social Determinants of Health     Financial Resource Strain:     Difficulty of Paying Living Expenses: Not on file   Food Insecurity:     Worried About Running Out of Food in the Last Year: Not on file    Mendez of Food in the Last Year: Not on file   Transportation Needs:     Lack of Transportation (Medical): Not on file    Lack of Transportation (Non-Medical): Not on file   Physical Activity:     Days of Exercise per Week: Not on file    Minutes of Exercise per Session: Not on file   Stress:     Feeling of Stress : Not on file   Social Connections:     Frequency of Communication with Friends and Family: Not on file    Frequency of Social Gatherings with Friends and Family: Not on file    Attends Gnosticism Services: Not on file    Active Member of [address] [place] [address] [employer] or Organizations: Not on file    Attends Club or Organization Meetings: Not on file    Marital Status: Not on file   Intimate Partner Violence:     Fear of Current or Ex-Partner: Not on file    Emotionally Abused: Not on file    Physically Abused: Not on file    Sexually Abused: Not on file   Housing Stability:     Unable to Pay for Housing in the Last Year: Not on file    Number of [place] in the Last Year: Not on file    Unstable Housing in the Last Year: Not on file       Review of Systems   Pertinent neg and pos in HPI. Objective:     Vitals:    11/28/21 1507 11/28/21 2230 11/29/21 0821 11/29/21 1255   BP: 113/66 107/70 123/89 120/82   Pulse: 75 66 76 73   Resp: 16 16 17 16   Temp: 97.3 °F (36.3 °C) 98.3 °F (36.8 °C) 98.2 °F (36.8 °C) 98.9 °F (37.2 °C)   SpO2: 99% 99% 100% 98%   Weight:       Height:           Physical Exam   Gen: NAD  CVS: S1S2  Lungs: CTAB  Abd: soft, NT/ND, +BS, inc-C/D/I  Perineum: no d/c, no active bleeding  Extr: no DCT/edema b/l    Assessment/Plan:     PPH / anemia - s/p transfusion PRBC at admission and Venofer. Continue oral iron therapy. Repeat CBC in 2 weeks. Postop infection: PICC line in place. Home Health set up to cont Zosyn x 14d total.    Disposition: d/c to home.

## 2021-11-29 NOTE — PROGRESS NOTES
0720 Verbal shift change report given to Jordi Torres (oncoming nurse) by Lanette Dougherty (offgoing nurse). Report included the following information SBAR, Kardex, Procedure Summary, Intake/Output, MAR and Recent Results. Pt appears sleeping at present; resp even, unlabored. 0810 Pt up to BR    0910 Assessment done; see flowsheets    1030 pt resting quietly; states is comfortable at this time. [address] [place] [address] notified for orders needed for PICC per Dulce Lainez CM. [phone] spoke w/ angio - procedure to be done around noon today for PICC insertion    1140 pt to angio via wheelchair ; transported by hospital transport personnel    1230 pt returned to room; PICC intact; SL also intact    1245 Call in to [address] [place] [address] re: d/c. No answer and unable to leave message on cell phone    1310 Call in to [address] [place] [address] re: d/c. No answer and unable to leave message on cell phone    [phone] CM called re: PICC in; states home health unable to get to pt home until tomorrow. Dr Jeff Estevez in  ; informed of d/c status/IV abx and situation w/ home health. Dr Jeff Estevez spoke w/ pt re: d/c    1420 pt resting quietly    1500 Reassessment done; ABD at bedside; pt mai food/fluids. Discussed w/ family member at [facility] what I was doing for IV admin. Stated that I could not verify the type of home equipment that would be delivered or IV mixture. At this time pt is to be d/c in am after 9am dose of IV abx    1615 pt denies pain at this time    1710  at desk - states he has spoken to Lane Stoner at [facility] Infusion and has rec'd teaching per phone for IV adminisration. States they have verified will make delivery if d/c tonight. Relates he is to let them know if pt can be d/c tonight since all they were waiting on was the d/c order. After discussion contacted [address] [place] [address] and related this conversation. She states will place d/c order.  states has pepcid, motrin and tylenol at home for use    1725 Called to [facility] Infusion - was directed to Lazaro Benavides. (CP) and relayed that pt was going to be d/c. She verified that Josué had already sent orders to her and that teaching was done by phone    [phone] aware of pt d/c    [phone] I have reviewed discharge instructions with the patient. The patient verbalized understanding. AVS given and reviewed; pt stable at this time; no c/o or needs verbalized. Patient     31 02 62 pt in wheelchair; d/c to home under self care.   at side

## 2021-11-29 NOTE — PROGRESS NOTES
Problem: Falls - Risk of  Goal: *Absence of Falls  Description: Document Karl Parmar Fall Risk and appropriate interventions in the flowsheet.   Outcome: Progressing Towards Goal  Note: Fall Risk Interventions:  Mobility Interventions: Assess mobility with egress test, Communicate number of staff needed for ambulation/transfer, Patient to call before getting OOB         Medication Interventions: Teach patient to arise slowly    Elimination Interventions: Call light in reach, Patient to call for help with toileting needs, Toilet paper/wipes in reach, Toileting schedule/hourly rounds              Problem: Pain  Goal: *Control of Pain  Outcome: Progressing Towards Goal

## 2021-11-29 NOTE — PROGRESS NOTES
Hospitalist Progress Note    Patient: Antoinette Harrison MRN: [id number]  CSN: [id number]    YOB: 1995  Age: 27 y.o. Sex: female    DOA: 11/25/2021 LOS:  LOS: 4 days          Chief Complaint:      hemorrhage    Assessment/Plan     Postpartum hemorrhage  Shock resolved  Received blood transfusion   Leukocytosis resolved      Postpartum hemorrhage   improved  Verify Hgb stable with CBC this am     Pleural effusion   Echo done recently and cardiologist saw pt   No CP or SOB      wound infection postoperative complication   Pseudomonas reported intially then negated by lab     Hypokalemia   Resolved    Repeat CBC this am    IV abx as per GYN team     Anemia   Received  iron transfusion     Medicine will sign off, d/c plans per OB-Gyn team  Call if needed      Disposition :  Patient Active Problem List   Diagnosis Code    UTI (urinary tract infection) N39.0    Anemia D64.9    Leukocytosis D72.829    Hypokalemia E87.6    At risk for breastfeeding difficulty Z91.89    Pulmonary edema J81.1    Postpartum hemorrhage O72.1    Shock ([address] R57.9    Sepsis ([address] A41.9    Pleural effusion, bilateral J90    Postoperative complication A43. 9XXA    Fever R50.9    Wound infection T14. 8XXA, L08.9    Anemia due to acute blood loss D62       Subjective:    I feel fine and want to go home    Eating  No dizziness  No N/V/D  Mild pain incision right side    Review of systems:    Constitutional: denies fevers, chills  Respiratory: denies SOB, cough  Cardiovascular: denies chest pain, palpitations  Gastrointestinal: denies diarrhea      Vital signs/Intake and Output:  Visit Vitals  /89 (BP 1 Location: Left arm, BP Patient Position: At rest)   Pulse 76   Temp 98.2 °F (36.8 °C)   Resp 17   Ht 5' 4\" (1.626 m)   Wt 74.8 kg (165 lb)   SpO2 100%   BMI 28.32 kg/m²     Current Shift:  No intake/output data recorded. Last three shifts:  No intake/output data recorded.     Exam:    General: Well developed, alert, NAD, OX3  CVS:Regular rate and rhythm, no M/R/G, S1/S2 heard, no thrill  Lungs:Clear to auscultation bilaterally, no wheezes, rhonchi, or rales  Abdomen: Soft, Nontender, No distention, Normal Bowel sounds,incision dressed  Extremities: No C/C/E, pulses palpable 2+  Neuro:grossly normal , follows commands  Psych:appropriate                Labs: Results:       Chemistry Recent Labs     11/29/21  0506 11/28/21  0213 11/27/21  0235   GLU 80 66* 76    144 143   K 4.4 3.3* 3.4*    113* 111   CO2 26 24 24   BUN 7 7 6*   CREA 0.67 0.48* 0.44*   CA 8.1* 7.9* 7.8*   AGAP 5 7 8   BUCR 10* 15 14   AP  --  76 86   TP  --  4.7* 4.7*   ALB  --  1.7* 1.7*   GLOB  --  3.0 3.0   AGRAT  --  0.6* 0.6*      CBC w/Diff Recent Labs     11/28/21  0850 11/28/21  0213 11/27/21  1819 11/27/21  1100 11/27/21  1100   WBC  --  12.3 10.7  --  12.1   RBC  --  2.29* 2.50*  --  2.62*   HGB 7.8* 6.7* 7.3*   < > 7.7*   HCT 24.8* 21.0* 22.9*   < > 23.9*   PLT  --  366 381  --  355    < > = values in this interval not displayed. Cardiac Enzymes No results for input(s): CPK, CKND1, MARSHALL in the last 72 hours. No lab exists for component: CKRMB, TROIP   Coagulation Recent Labs     11/27/21  0235   PTP 14.8   INR 1.2       Lipid Panel No results found for: CHOL, CHOLPOCT, CHOLX, CHLST, CHOLV, 439164, HDL, HDLP, LDL, LDLC, DLDLP, 074037, VLDLC, VLDL, TGLX, TRIGL, TRIGP, TGLPOCT, CHHD, CHHDX   BNP No results for input(s): BNPP in the last 72 hours.    Liver Enzymes Recent Labs     11/28/21  0213   TP 4.7*   ALB 1.7*   AP 76      Thyroid Studies No results found for: T4, T3U, TSH, TSHEXT, TSHEXT     Procedures/imaging: see electronic medical records for all procedures/Xrays and details which were not copied into this note but were reviewed prior to creation of Lg Ventura MD

## 2021-11-30 LAB
ATRIAL RATE: 90 BPM
BASOPHILS # BLD: 0 K/UL (ref 0–0.1)
BASOPHILS NFR BLD: 0 % (ref 0–2)
CALCULATED P AXIS, ECG09: 47 DEGREES
CALCULATED R AXIS, ECG10: 17 DEGREES
CALCULATED T AXIS, ECG11: 71 DEGREES
DIAGNOSIS, 93000: NORMAL
DIFFERENTIAL METHOD BLD: ABNORMAL
EOSINOPHIL # BLD: 0 K/UL (ref 0–0.4)
EOSINOPHIL NFR BLD: 0 % (ref 0–5)
ERYTHROCYTE [DISTWIDTH] IN BLOOD BY AUTOMATED COUNT: 14.6 % (ref 11.6–14.5)
FIBRINOGEN PPP-MCNC: 434 MG/DL (ref 210–451)
HCT VFR BLD AUTO: 21.4 % (ref 35–45)
HGB BLD-MCNC: 7 G/DL (ref 12–16)
IMM GRANULOCYTES # BLD AUTO: 0.6 K/UL (ref 0–0.04)
IMM GRANULOCYTES NFR BLD AUTO: 4 % (ref 0–0.5)
LYMPHOCYTES # BLD: 2 K/UL (ref 0.9–3.6)
LYMPHOCYTES NFR BLD: 14 % (ref 21–52)
MCH RBC QN AUTO: 30.6 PG (ref 24–34)
MCHC RBC AUTO-ENTMCNC: 32.7 G/DL (ref 31–37)
MCV RBC AUTO: 93.4 FL (ref 78–100)
MONOCYTES # BLD: 0.7 K/UL (ref 0.05–1.2)
MONOCYTES NFR BLD: 4 % (ref 3–10)
NEUTS SEG # BLD: 11.7 K/UL (ref 1.8–8)
NEUTS SEG NFR BLD: 77 % (ref 40–73)
NRBC # BLD: 0.04 K/UL (ref 0–0.01)
NRBC BLD-RTO: 0.3 PER 100 WBC
P-R INTERVAL, ECG05: 128 MS
PLATELET # BLD AUTO: 415 K/UL (ref 135–420)
PMV BLD AUTO: 8.4 FL (ref 9.2–11.8)
Q-T INTERVAL, ECG07: 374 MS
QRS DURATION, ECG06: 70 MS
QTC CALCULATION (BEZET), ECG08: 457 MS
RBC # BLD AUTO: 2.29 M/UL (ref 4.2–5.3)
VENTRICULAR RATE, ECG03: 90 BPM
WBC # BLD AUTO: 15.1 K/UL (ref 4.6–13.2)

## 2021-11-30 PROCEDURE — 74011250636 HC RX REV CODE- 250/636: Performed by: EMERGENCY MEDICINE

## 2021-11-30 PROCEDURE — 74011250637 HC RX REV CODE- 250/637: Performed by: MIDWIFE

## 2021-11-30 PROCEDURE — 74011000258 HC RX REV CODE- 258: Performed by: MIDWIFE

## 2021-11-30 PROCEDURE — 85025 COMPLETE CBC W/AUTO DIFF WBC: CPT

## 2021-11-30 PROCEDURE — 74011250637 HC RX REV CODE- 250/637

## 2021-11-30 PROCEDURE — P9016 RBC LEUKOCYTES REDUCED: HCPCS

## 2021-11-30 PROCEDURE — 74011250636 HC RX REV CODE- 250/636: Performed by: MIDWIFE

## 2021-11-30 PROCEDURE — 85384 FIBRINOGEN ACTIVITY: CPT

## 2021-11-30 PROCEDURE — 65270000029 HC RM PRIVATE

## 2021-11-30 PROCEDURE — 36430 TRANSFUSION BLD/BLD COMPNT: CPT

## 2021-11-30 RX ORDER — IBUPROFEN 600 MG/1
600 TABLET ORAL 4 TIMES DAILY
Status: DISCONTINUED | OUTPATIENT
Start: 2021-11-30 | End: 2021-12-01 | Stop reason: HOSPADM

## 2021-11-30 RX ORDER — POTASSIUM CHLORIDE 20 MEQ/1
20 TABLET, EXTENDED RELEASE ORAL 2 TIMES DAILY
Status: DISCONTINUED | OUTPATIENT
Start: 2021-11-30 | End: 2021-12-01 | Stop reason: HOSPADM

## 2021-11-30 RX ORDER — IBUPROFEN 400 MG/1
800 TABLET ORAL ONCE
Status: COMPLETED | OUTPATIENT
Start: 2021-11-30 | End: 2021-11-30

## 2021-11-30 RX ADMIN — POTASSIUM CHLORIDE 20 MEQ: 1500 TABLET, EXTENDED RELEASE ORAL at 08:58

## 2021-11-30 RX ADMIN — IBUPROFEN 600 MG: 600 TABLET, FILM COATED ORAL at 12:25

## 2021-11-30 RX ADMIN — PIPERACILLIN AND TAZOBACTAM 3.38 G: 3; .375 INJECTION, POWDER, LYOPHILIZED, FOR SOLUTION INTRAVENOUS at 17:24

## 2021-11-30 RX ADMIN — PIPERACILLIN AND TAZOBACTAM 3.38 G: 3; .375 INJECTION, POWDER, LYOPHILIZED, FOR SOLUTION INTRAVENOUS at 11:49

## 2021-11-30 RX ADMIN — METHYLERGONOVINE 200 MCG: 0.2 TABLET ORAL at 11:40

## 2021-11-30 RX ADMIN — FERROUS SULFATE TAB 325 MG (65 MG ELEMENTAL FE) 325 MG: 325 (65 FE) TAB at 17:22

## 2021-11-30 RX ADMIN — IBUPROFEN 600 MG: 600 TABLET, FILM COATED ORAL at 18:30

## 2021-11-30 RX ADMIN — POTASSIUM CHLORIDE 20 MEQ: 1500 TABLET, EXTENDED RELEASE ORAL at 02:06

## 2021-11-30 RX ADMIN — IBUPROFEN 800 MG: 400 TABLET, FILM COATED ORAL at 04:01

## 2021-11-30 RX ADMIN — METHYLERGONOVINE 200 MCG: 0.2 TABLET ORAL at 00:37

## 2021-11-30 RX ADMIN — PIPERACILLIN AND TAZOBACTAM 3.38 G: 3; .375 INJECTION, POWDER, LYOPHILIZED, FOR SOLUTION INTRAVENOUS at 05:31

## 2021-11-30 RX ADMIN — IBUPROFEN 600 MG: 600 TABLET, FILM COATED ORAL at 22:17

## 2021-11-30 RX ADMIN — SODIUM CHLORIDE 150 ML/HR: 900 INJECTION, SOLUTION INTRAVENOUS at 11:41

## 2021-11-30 RX ADMIN — POTASSIUM CHLORIDE 20 MEQ: 1500 TABLET, EXTENDED RELEASE ORAL at 22:17

## 2021-11-30 RX ADMIN — METHYLERGONOVINE 200 MCG: 0.2 TABLET ORAL at 05:32

## 2021-11-30 RX ADMIN — FERROUS SULFATE TAB 325 MG (65 MG ELEMENTAL FE) 325 MG: 325 (65 FE) TAB at 08:58

## 2021-11-30 RX ADMIN — METHYLERGONOVINE 200 MCG: 0.2 TABLET ORAL at 18:30

## 2021-11-30 NOTE — LACTATION NOTE
Per patient, has been pumping q3 and dumping milk due to medications. Patient stated, no questions or concerns at this time. Discussed power pumping if a decrease in milk supply is noticed. Patient verbalized understanding.

## 2021-11-30 NOTE — PROGRESS NOTES
Reason for Admission:   C/o Vaginal bleeding                    RUR Score:    17              PCP: First and Last name:   Yuko Horta MD     Name of Practice:    Are you a current patient: Yes/No:    Approximate date of last visit:    Can you participate in a virtual visit if needed:     Do you (patient/family) have any concerns for transition/discharge? TBD               Plan for utilizing home health:  Was last d/c with on IV ABX     Current Advanced Directive/Advance Care Plan:  Full Code      Healthcare Decision Maker:   Click here to complete [address] including selection of the Healthcare Decision Maker Relationship (ie \"Primary\")            Primary Decision Maker: dar moe - Spouse - [phone]    Transition of Care Plan: Chart reviewed, pt was recently D/C 11-29-21  On Home IV abx arranged with Kristyn Choi was notified this morning that pt has been admitted and to hold home services until notified,cm did update bedside nurse  Jonothan Goldberg to notify cm if pt discharges or when discharges to update if abx will be needed for d/c planning and to update [address] [place] [address], please contact  for any other needs for d/c planning. Ex [id number]. Care Management Interventions  PCP Verified by CM: Yes  Palliative Care Criteria Met (RRAT>21 & CHF Dx)?: No  Mode of Transport at Discharge:  Other (see comment) (spouse)  Support Systems: Spouse/Significant Other  Confirm Follow Up Transport: Family

## 2021-11-30 NOTE — ED NOTES
TRANSFER - OUT REPORT:    Verbal report given to Fuad on Aleja Uriostegui  being transferred to Mom/baby for routine progression of care       Report consisted of patients Situation, Background, Assessment and   Recommendations(SBAR). Information from the following report(s) SBAR, ED Summary, [facility] and Recent Results was reviewed with the receiving nurse. Lines:   PICC Double Lumen 94/59/92 Basilic; Right (Active)   Criteria for Appropriate Use Other (comment) 11/29/21 1820   Site Assessment Clean, dry, & intact 11/29/21 1820   Phlebitis Assessment 0 11/29/21 1820   Infiltration Assessment 0 11/29/21 1820   Arm Circumference (cm) 26 cm 11/29/21 1820   Date of Last Dressing Change 11/29/21 11/29/21 1820   Dressing Status Clean, dry, & intact 11/29/21 1820   Dressing Type Disk with Chlorhexadine gluconate (CHG); Transparent 11/29/21 1820   Hub Color/Line Status Capped 11/29/21 1820   Alcohol Cap Used Yes 11/29/21 1820        Opportunity for questions and clarification was provided.       Patient transported with:   [employer]

## 2021-11-30 NOTE — PROGRESS NOTES
2345- TRANSFER - IN REPORT:    Verbal report received from SAMIR Cruz RN(name) on Josie John  being received from ED(unit) for routine progression of care      Report consisted of patients Situation, Background, Assessment and   Recommendations(SBAR). Information from the following report(s) SBAR, Intake/Output, MAR and Recent Results was reviewed with the receiving nurse. Opportunity for questions and clarification was provided. Assessment completed upon patients arrival to unit and care assumed. 0000- Bedside and Verbal shift change report given to Malika Box RN (oncoming nurse) by Maryann Candelaria RN (offgoing nurse). Report included the following information SBAR, Intake/Output, MAR and Recent Results.

## 2021-11-30 NOTE — ED PROVIDER NOTES
EMERGENCY DEPARTMENT HISTORY AND PHYSICAL EXAM    Date: 11/29/2021  Patient Name: Sudeep Clark    History of Presenting Illness     Chief Complaint   Patient presents with    Vaginal Bleeding         History Provided By: Patient    Additional History (Context):   9:20 PM  Sudeep Clark is a 27 y.o. female with PMHX of postpartum hemorrhage associated with hypotension status post blood transfusion x2 who presents to the emergency department C/O vaginal bleeding along with passing clots. Patient was just discharged earlier today for postpartum hemorrhage status post blood transfusion and is currently receiving IV antibiotics which was documented as a postoperative infection and sounds to be endometritis. She had a blood transfusion x2 in addition to iron therapy and discharged with a PICC line placement today. She is set up for 14 days of Zosyn. Today she was breast-feeding her little 1 when she felt as if she was passing blood vaginally. Upon standing she is started to notice she was passing large clots which prompted her to call 911 after conversation with the OB/GYN midwife. She arrives stating she has been nauseous but without vomiting a little bit of loose stool since starting the Zosyn occasionally dizzy lightheaded but no chest pain shortness of breath or syncopal episodes. Social History  No smoking drinking or drugs    Family History  She has family members with \"hemoglobin problems\" however no known hemophilia von Willebrand's Episcopal's disease or other specific coagulopathies. PCP: Kenyon De MD    Current Outpatient Medications   Medication Sig Dispense Refill    ibuprofen (MOTRIN) 800 mg tablet Take 1 Tablet by mouth three (3) times daily. 90 Tablet 0    piperacillin-tazobactam 3.375 gram 3.375 g IVPB 3.375 g by IntraVENous route every six (6) hours for 11 doses.  10 Dose 4    ferrous sulfate 325 mg (65 mg iron) tablet Take 1 Tablet by mouth two (2) times daily (with meals). 60 Tablet 0       Past History     Past Medical History:  No past medical history on file. Past Surgical History:  Past Surgical History:   Procedure Laterality Date    HX  SECTION      HX  SECTION         Family History:  No family history on file. Social History:  Social History     Tobacco Use    Smoking status: Never Smoker    Smokeless tobacco: Not on file   Substance Use Topics    Alcohol use: Not Currently    Drug use: Never       Allergies:  No Known Allergies      Review of Systems   Review of Systems   Genitourinary: Positive for vaginal bleeding. Negative for vaginal discharge and vaginal pain. Neurological: Positive for light-headedness. Hematological: Bruises/bleeds easily. All other systems reviewed and are negative. Physical Exam   There were no vitals filed for this visit. Physical Exam  Vitals and nursing note reviewed. Constitutional:       General: She is not in acute distress. Appearance: She is well-developed. She is not diaphoretic. HENT:      Head: Normocephalic and atraumatic. Eyes:      General: No scleral icterus. Extraocular Movements:      Right eye: Normal extraocular motion. Left eye: Normal extraocular motion. Conjunctiva/sclera: Conjunctivae normal.      Pupils: Pupils are equal, round, and reactive to light. Neck:      Trachea: No tracheal deviation. Cardiovascular:      Rate and Rhythm: Normal rate and regular rhythm. Heart sounds: Normal heart sounds. Pulmonary:      Effort: Pulmonary effort is normal. No respiratory distress. Breath sounds: Normal breath sounds. No stridor. Abdominal:      General: Bowel sounds are normal. There is no distension. Palpations: Abdomen is soft. Tenderness: There is no abdominal tenderness. There is no rebound. Genitourinary:     Vagina: Bleeding present. Cervix: Cervical bleeding present. Uterus: Enlarged.        Adnexa: Right adnexa normal and left adnexa normal.      Comments: Large visible clot past with visible tissue. Cervix was not open boggy or friable. Musculoskeletal:         General: No tenderness. Normal range of motion. Cervical back: Normal range of motion and neck supple. Comments: Grossly unremarkable without abnormalities   Skin:     General: Skin is warm and dry. Capillary Refill: Capillary refill takes more than 3 seconds. Coloration: Skin is ashen and pale. Findings: No erythema or rash. Neurological:      Mental Status: She is alert and oriented to person, place, and time. GCS: GCS eye subscore is 4. GCS verbal subscore is 5. GCS motor subscore is 6. Cranial Nerves: No cranial nerve deficit. Motor: Motor function is intact. No weakness. Coordination: Coordination is intact. Psychiatric:         Mood and Affect: Mood normal.         Behavior: Behavior normal.         Thought Content:  Thought content normal.         Judgment: Judgment normal.         Diagnostic Study Results     Labs -  Recent Results (from the past 24 hour(s))   METABOLIC PANEL, BASIC    Collection Time: 11/29/21  5:06 AM   Result Value Ref Range    Sodium 142 136 - 145 mmol/L    Potassium 4.4 3.5 - 5.5 mmol/L    Chloride 111 100 - 111 mmol/L    CO2 26 21 - 32 mmol/L    Anion gap 5 3.0 - 18 mmol/L    Glucose 80 74 - 99 mg/dL    BUN 7 7.0 - 18 MG/DL    Creatinine 0.67 0.6 - 1.3 MG/DL    BUN/Creatinine ratio 10 (L) 12 - 20      GFR est AA >60 >60 ml/min/1.73m2    GFR est non-AA >60 >60 ml/min/1.73m2    Calcium 8.1 (L) 8.5 - 10.1 MG/DL   CBC WITH AUTOMATED DIFF    Collection Time: 11/29/21  5:06 AM   Result Value Ref Range    WBC 12.0 4.6 - 13.2 K/uL    RBC 2.49 (L) 4.20 - 5.30 M/uL    HGB 7.3 (L) 12.0 - 16.0 g/dL    HCT 23.9 (L) 35.0 - 45.0 %    MCV 96.0 78.0 - 100.0 FL    MCH 29.3 24.0 - 34.0 PG    MCHC 30.5 (L) 31.0 - 37.0 g/dL    RDW 15.1 (H) 11.6 - 14.5 %    PLATELET 294 (H) 261 - 420 K/uL    MPV 8.6 (L) 9.2 - 11.8 FL    NRBC 0.2 (H) 0  WBC    ABSOLUTE NRBC 0.03 (H) 0.00 - 0.01 K/uL    NEUTROPHILS 76 (H) 40 - 73 %    LYMPHOCYTES 18 (L) 21 - 52 %    MONOCYTES 2 (L) 3 - 10 %    EOSINOPHILS 1 0 - 5 %    BASOPHILS 0 0 - 2 %    MYELOCYTES 3 (H) 0 %    IMMATURE GRANULOCYTES 0 %    ABS. NEUTROPHILS 9.1 (H) 1.8 - 8.0 K/UL    ABS. LYMPHOCYTES 2.2 0.9 - 3.6 K/UL    ABS. MONOCYTES 0.2 0.05 - 1.2 K/UL    ABS. EOSINOPHILS 0.1 0.0 - 0.4 K/UL    ABS. BASOPHILS 0.0 0.0 - 0.1 K/UL    ABS. IMM. GRANS. 0.0 K/UL    DF AUTOMATED      PLATELET COMMENTS Increased Platelets      RBC COMMENTS POLYCHROMASIA  1+            Radiologic Studies -   No orders to display     CT Results  (Last 48 hours)    None        CXR Results  (Last 48 hours)    None          Medications given in the ED-  Medications - No data to display      Medical Decision Making   I am the first provider for this patient. I reviewed the vital signs, available nursing notes, past medical history, past surgical history, family history and social history. Vital Signs-Reviewed the patient's vital signs. Pulse Oximetry Analysis -100% on room air    Cardiac Monitor:  Rate: 100 bpm  Rhythm: Borderline sinus tach    EKG interpretation: (Preliminary)  10:36 PM    Normal sinus rhythm, rate 90, normal ST segments, no arrhythmia or preexcitation  EKG read by Katelynn Rodriguez MD      Records Reviewed: NURSING NOTES AND PREVIOUS MEDICAL RECORDS    Provider Notes (Medical Decision Making):   Patient returns to the hospital after more vasovagal episodes and continued vaginal bleeding postpartum. She had blood transfusion prior to discharge arrives pallorous with clear obvious orthostatic symptoms. She spontaneously passed large clot which we tested a couple of pints of sterile water and using crenation and manipulation were able to massage had a large piece of visible tissue which is likely retained products of conception including active placenta.  Patient and  were somewhat surprised that she has had multiple ultrasounds demonstrating no visible retained products of conception. I explained the possible mechanism of this and imaging is imperfect and are greatly appreciated for our efforts to find the source of her continued symptoms. Prior to my consultation with OB/GYN, Ariadna Leigh from labor and delivery called alerted that the patient's impending arrival and he can be directly admitted to their service. I Expressed to her that the patient had already arrived and was passing active tissue. She was ordered blood and run a DIC panel to look for potential complications associated with the incision bleeding. She responded well to treatments can be readmitted to their service. Procedures:  Procedures    ED Course:   9:20 PM: Initial assessment performed. The patients presenting problems have been discussed, and they are in agreement with the care plan formulated and outlined with them. I have encouraged them to ask questions as they arise throughout their visit. Diagnosis and Disposition     Critical Care Time: 55 minutes  CRITICAL CARE NOTE:  11:53 PM  I have spent 55 minutes of critical care time involved in lab review, consultations with specialist, family decision-making, and documentation. During this entire length of time I was immediately available to the patient. Critical Care: The reason for providing this level of medical care for this critically ill patient was due a critical illness that impaired one or more vital organ systems such that there was a high probability of imminent or life threatening deterioration in the patients condition. This care involved high complexity decision making to assess, manipulate, and support vital system functions, to treat this degreee vital organ system failure and to prevent further life threatening deterioration of the patients condition. Core Measures:  For Hospitalized Patients:    1.  Hospitalization Decision Time:  The decision to hospitalize the patient was made by Kajal Dill MD  Upon arrival on 11/29/2021    2. Aspirin: Aspirin was not given because the patient is a bleeding risk    10:22 PM  Patient is being admitted to the hospital by Dr. Bhavana Worthington. The results of their tests and reasons for their admission have been discussed with them and/or available family. They convey agreement and understanding for the need to be admitted and for their admission diagnosis. CONDITIONS ON ADMISSION:  Sepsis is not present at the time of admission. Deep Vein Thrombosis is not present at the time of admission. Thrombosis is not present at the time of admission. Urinary Tract Infection is not present at the time of admission. Pneumonia is not present at the time of admission. MRSA is not present at the time of admission. Wound infection is not present at the time of admission. Pressure Ulcer is not present at the time of admission. CLINICAL IMPRESSION:    1. Hemorrhage, immediate postpartum    2. Severe Anemia  3. Orthostatic hypotension  _______________________________    This note was partially transcribed via voice recognition software.  Although efforts have been made to catch any discrepancies, it may contain sound alike words, grammatical errors, or nonsen

## 2021-11-30 NOTE — PROGRESS NOTES
0720 Bedside and Verbal shift change report given to CHRISTELLE Dunn RN (oncoming nurse) by Ruddy Hou RN (offgoing nurse). Report included the following information SBAR, Kardex, Intake/Output, MAR and Recent Results. 4245 patient placed no bedpan, voided 300mL and had a BM    0800 shift assessment complete, pt has no needs or concerns at this time    0858 meds given    1000 patient ambulated to bathroom, voided 300mL    1024 labs drawn    1149 scheduled meds given    1225 pain med given    1320 pain reassessed    1412 informed patient that her diet had been changed to regular. pt had questions regarding when doctor would be coming, this nurse consulted with midwife who would call back    1522 reassessment complete, pt has no needs or concerns at this time    46 Dr. Waldo Goins in room, will return later    1722 iron and antibiotics started    1830 meds given, no needs at this time    1910 Bedside and Verbal shift change report given to THANH Maharaj RN (oncoming nurse) by Jonathan James. Rj Dunn RN (offgoing nurse). Report included the following information SBAR, Kardex, Intake/Output, MAR and Recent Results.

## 2021-11-30 NOTE — PROGRESS NOTES
Reviewed care with patient and  (via telephone). Explained suspected course of postpartum complications. Will continue to monitor for infection and bleeding.  Anticipate discharge in the am.

## 2021-11-30 NOTE — PROGRESS NOTES
TRANSFER - IN REPORT:    Verbal report received from ARTEM Garcia(name) on Aguilar Holland  being received from ED(unit) for routine progression of care      Report consisted of patients Situation, Background, Assessment and   Recommendations(SBAR). Information from the following report(s) SBAR, Kardex, Intake/Output, MAR and Recent Results was reviewed with the receiving nurse. Opportunity for questions and clarification was provided. Assessment completed upon patients arrival to unit and care assumed. 0028 informed CNM of patients arrival and patients order for cardiac monitoring. disregard that order for this unit per CMN ALISHA Moraes. RN also verified that the patient was able to take sips of water with her oral medications. Its also verified  the plan for the unit of blood to be given tonight.     0200 1 unit of blood started at this time. 0515 blood transfusion complete     0710 Bedside and Verbal shift change report given to CHRISTELLE Scott RN (oncoming nurse) by Heriberto Marie RN (offgoing nurse). Report included the following information SBAR, Kardex, Intake/Output, MAR and Recent Results.

## 2021-11-30 NOTE — PROGRESS NOTES
Problem: Pain  Goal: *Control of Pain  Outcome: Progressing Towards Goal     Problem: Patient Education: Go to Patient Education Activity  Goal: Patient/Family Education  Outcome: Progressing Towards Goal     Problem: Falls - Risk of  Goal: *Absence of Falls  Description: Document Jose Antonio Lindsay Fall Risk and appropriate interventions in the flowsheet. Outcome: Progressing Towards Goal  Note: Fall Risk Interventions:  Mobility Interventions:  (pt on bedrest)              Elimination Interventions: Patient to call for help with toileting needs, Toilet paper/wipes in reach, Call light in reach              Problem: Patient Education: Go to Patient Education Activity  Goal: Patient/Family Education  Outcome: Progressing Towards Goal     Problem: Pressure Injury - Risk of  Goal: *Prevention of pressure injury  Description: Document Salvador Scale and appropriate interventions in the flowsheet. Outcome: Progressing Towards Goal  Note: Pressure Injury Interventions:             Activity Interventions: Pressure redistribution bed/mattress(bed type)    Mobility Interventions: Pressure redistribution bed/mattress (bed type)    Nutrition Interventions:  (pt NPO)                     Problem: Patient Education: Go to Patient Education Activity  Goal: Patient/Family Education  Outcome: Progressing Towards Goal

## 2021-11-30 NOTE — H&P
Post partum H&P       Nathen Aguilar   Chief complaint:  Vaginal bleeding     Iveth Crespo is a 27 y.o. female PPD #16 s/p 1 LTCS for failure to progress. Pt was readmitted 11/21 at [facility] to Hospitalist service for pulmonary edema, anemia, UTI and received transfusion, lasix, abx. She was dc'd home on oral abx. She was readmitted 11/24 with a large gush of blood vaginally and continued heavy bleeding. She came to ER via EMS. On arrival she was tachycardic, hypotensive with hypoxia. She received IV pitocin and cytotec and bleeding reduced. She  received 2 units of PRBC's. She was dx with PPH and endometritis, discharged home 11/29 with a PICC line for IV Zosyn. The same day she returns to the ER via EMS with another large gush of blood and passage of large clots. The clot removed in the ER contained tissue products. She reports no fever or chills at home. Reports some nausea, no vomiting. She has had fatigue. No CP or SOB. She reports some loose stool since starting the Zosyn. Assesment:   Patient Active Problem List   Diagnosis Code    UTI (urinary tract infection) N39.0    Anemia D64.9    Leukocytosis D72.829    Hypokalemia E87.6    At risk for breastfeeding difficulty Z91.89    Pulmonary edema J81.1    Postpartum hemorrhage O72.1    Shock ([address] R57.9    Sepsis ([address] A41.9    Pleural effusion, bilateral J90    Postoperative complication I78. 9XXA    Fever R50.9    Wound infection T14. 8XXA, L08.9    Anemia due to acute blood loss D62    Bleeding postpartum O72.1    Severe anemia D64.9    Endometritis following delivery O86.12       Plan: admit for possible D&C, NPO   Severe anemia - Transfuse 1 unit PRBC now, repeat H/H in 4 hrs post transfusion   Correct hypokalemia with KCL 20 meq bid   Ferrous sulfate 325 mg po bid for anemia   Leukocytosis - continue Zosyn every 6 hrs. Dr. Sherly Vail aware of admission and POC    She is without significant complaints. Pain controlled on current medication.   Voiding without difficulty.          Current Facility-Administered Medications   Medication Dose Route Frequency    potassium chloride (K-DUR, KLOR-CON M20) SR tablet 20 mEq  20 mEq Oral BID    0.9% sodium chloride infusion  150 mL/hr IntraVENous CONTINUOUS    sodium chloride (NS) flush 5-40 mL  5-40 mL IntraVENous Q8H    ferrous sulfate tablet 325 mg  1 Tablet Oral BID WITH MEALS    piperacillin-tazobactam (ZOSYN) 3.375 g in 0.9% sodium chloride (MBP/ADV) 100 mL MBP  3.375 g IntraVENous Q6H    methylergonovine (METHERGINE) tablet 200 mcg  200 mcg Oral Q6H       Vitals:  Visit Vitals  /68   Pulse 96   Temp 97 °F (36.1 °C)   Resp 16   Ht 5' 4\" (1.626 m)   Wt 73.5 kg (162 lb)   SpO2 100%   BMI 27.81 kg/m²     Temp (24hrs), Av.1 °F (36.7 °C), Min:97 °F (36.1 °C), Max:98.9 °F (37.2 °C)      Last 24hr Input/Output:    Intake/Output Summary (Last 24 hours) at 2021 0118  Last data filed at 2021 2328  Gross per 24 hour   Intake 100 ml   Output --   Net 100 ml          Exam:  General: alert, fatigued, cooperative, no distress     Abdomen: abdomen is soft without significant tenderness, masses, organomegaly or guarding     Extremities: extremities normal, atraumatic, no cyanosis or edema      Labs:   Lab Results   Component Value Date/Time    WBC 16.9 (H) 2021 10:04 PM    WBC 12.0 2021 05:06 AM    WBC 12.3 2021 02:13 AM    HGB 6.1 (L) 2021 10:04 PM    HGB 7.3 (L) 2021 05:06 AM    HGB 7.8 (L) 2021 08:50 AM    HCT 19.7 (L) 2021 10:04 PM    HCT 23.9 (L) 2021 05:06 AM    HCT 24.8 (L) 2021 08:50 AM    PLATELET 989 (H)  10:04 PM    PLATELET 038 (H)  05:06 AM    PLATELET 444  02:13 AM       Recent Results (from the past 24 hour(s))   METABOLIC PANEL, BASIC    Collection Time: 21  5:06 AM   Result Value Ref Range    Sodium 142 136 - 145 mmol/L    Potassium 4.4 3.5 - 5.5 mmol/L    Chloride 111 100 - 111 mmol/L    CO2 26 21 - 32 mmol/L    Anion gap 5 3.0 - 18 mmol/L    Glucose 80 74 - 99 mg/dL    BUN 7 7.0 - 18 MG/DL    Creatinine 0.67 0.6 - 1.3 MG/DL    BUN/Creatinine ratio 10 (L) 12 - 20      GFR est AA >60 >60 ml/min/1.73m2    GFR est non-AA >60 >60 ml/min/1.73m2    Calcium 8.1 (L) 8.5 - 10.1 MG/DL   CBC WITH AUTOMATED DIFF    Collection Time: 11/29/21  5:06 AM   Result Value Ref Range    WBC 12.0 4.6 - 13.2 K/uL    RBC 2.49 (L) 4.20 - 5.30 M/uL    HGB 7.3 (L) 12.0 - 16.0 g/dL    HCT 23.9 (L) 35.0 - 45.0 %    MCV 96.0 78.0 - 100.0 FL    MCH 29.3 24.0 - 34.0 PG    MCHC 30.5 (L) 31.0 - 37.0 g/dL    RDW 15.1 (H) 11.6 - 14.5 %    PLATELET 050 (H) 790 - 420 K/uL    MPV 8.6 (L) 9.2 - 11.8 FL    NRBC 0.2 (H) 0  WBC    ABSOLUTE NRBC 0.03 (H) 0.00 - 0.01 K/uL    NEUTROPHILS 76 (H) 40 - 73 %    LYMPHOCYTES 18 (L) 21 - 52 %    MONOCYTES 2 (L) 3 - 10 %    EOSINOPHILS 1 0 - 5 %    BASOPHILS 0 0 - 2 %    MYELOCYTES 3 (H) 0 %    IMMATURE GRANULOCYTES 0 %    ABS. NEUTROPHILS 9.1 (H) 1.8 - 8.0 K/UL    ABS. LYMPHOCYTES 2.2 0.9 - 3.6 K/UL    ABS. MONOCYTES 0.2 0.05 - 1.2 K/UL    ABS. EOSINOPHILS 0.1 0.0 - 0.4 K/UL    ABS. BASOPHILS 0.0 0.0 - 0.1 K/UL    ABS. IMM. GRANS. 0.0 K/UL    DF AUTOMATED      PLATELET COMMENTS Increased Platelets      RBC COMMENTS POLYCHROMASIA  1+       CBC WITH AUTOMATED DIFF    Collection Time: 11/29/21 10:04 PM   Result Value Ref Range    WBC 16.9 (H) 4.6 - 13.2 K/uL    RBC 2.09 (L) 4.20 - 5.30 M/uL    HGB 6.1 (L) 12.0 - 16.0 g/dL    HCT 19.7 (L) 35.0 - 45.0 %    MCV 94.3 78.0 - 100.0 FL    MCH 29.2 24.0 - 34.0 PG    MCHC 31.0 31.0 - 37.0 g/dL    RDW 14.8 (H) 11.6 - 14.5 %    PLATELET 669 (H) 291 - 420 K/uL    MPV 8.6 (L) 9.2 - 11.8 FL    NRBC 0.5 (H) 0  WBC    ABSOLUTE NRBC 0.08 (H) 0.00 - 0.01 K/uL    NEUTROPHILS 76 (H) 40 - 73 %    LYMPHOCYTES 14 (L) 21 - 52 %    MONOCYTES 4 3 - 10 %    EOSINOPHILS 1 0 - 5 %    BASOPHILS 0 0 - 2 %    IMMATURE GRANULOCYTES 5 (H) 0.0 - 0.5 %    ABS.  NEUTROPHILS 12.9 (H) 1.8 - 8.0 K/UL    ABS. LYMPHOCYTES 2.3 0.9 - 3.6 K/UL    ABS. MONOCYTES 0.7 0.05 - 1.2 K/UL    ABS. EOSINOPHILS 0.1 0.0 - 0.4 K/UL    ABS. BASOPHILS 0.1 0.0 - 0.1 K/UL    ABS. IMM. GRANS. 0.9 (H) 0.00 - 0.04 K/UL    DF AUTOMATED     METABOLIC PANEL, COMPREHENSIVE    Collection Time: 11/29/21 10:04 PM   Result Value Ref Range    Sodium 141 136 - 145 mmol/L    Potassium 3.1 (L) 3.5 - 5.5 mmol/L    Chloride 110 100 - 111 mmol/L    CO2 25 21 - 32 mmol/L    Anion gap 6 3.0 - 18 mmol/L    Glucose 120 (H) 74 - 99 mg/dL    BUN 10 7.0 - 18 MG/DL    Creatinine 0.64 0.6 - 1.3 MG/DL    BUN/Creatinine ratio 16 12 - 20      GFR est AA >60 >60 ml/min/1.73m2    GFR est non-AA >60 >60 ml/min/1.73m2    Calcium 8.2 (L) 8.5 - 10.1 MG/DL    Bilirubin, total 0.1 (L) 0.2 - 1.0 MG/DL    ALT (SGPT) 18 13 - 56 U/L    AST (SGOT) 11 10 - 38 U/L    Alk.  phosphatase 79 45 - 117 U/L    Protein, total 5.4 (L) 6.4 - 8.2 g/dL    Albumin 2.0 (L) 3.4 - 5.0 g/dL    Globulin 3.4 2.0 - 4.0 g/dL    A-G Ratio 0.6 (L) 0.8 - 1.7     MAGNESIUM    Collection Time: 11/29/21 10:04 PM   Result Value Ref Range    Magnesium 1.6 1.6 - 2.6 mg/dL   PROTHROMBIN TIME + INR    Collection Time: 11/29/21 10:04 PM   Result Value Ref Range    Prothrombin time 14.8 11.5 - 15.2 sec    INR 1.2 0.8 - 1.2     PTT    Collection Time: 11/29/21 10:04 PM   Result Value Ref Range    aPTT 47.8 (H) 23.0 - 36.4 SEC   D DIMER    Collection Time: 11/29/21 10:04 PM   Result Value Ref Range    D DIMER 10.13 (H) <0.46 ug/ml(FEU)   TYPE & SCREEN    Collection Time: 11/29/21 10:04 PM   Result Value Ref Range    Crossmatch Expiration 12/02/2021,2359     ABO/Rh(D) O POSITIVE     Antibody screen NEG     Unit number [id number]     Blood component type  LR     Unit division 00     Status of unit ALLOCATED     Crossmatch result Compatible    EKG, 12 LEAD, INITIAL    Collection Time: 11/29/21 10:36 PM   Result Value Ref Range    Ventricular Rate 90 BPM    Atrial Rate 90 BPM    P-R Interval 128 ms    QRS Duration 70 ms    Q-T Interval 374 ms    QTC Calculation (Bezet) 457 ms    Calculated P Axis 47 degrees    Calculated R Axis 17 degrees    Calculated T Axis 71 degrees    Diagnosis       Normal sinus rhythm  Normal ECG  No previous ECGs available     RBC, ALLOCATE    Collection Time: 11/29/21 11:45 PM   Result Value Ref Range    HISTORY CHECKED?  Historical check performed

## 2021-11-30 NOTE — PROGRESS NOTES
Progress Note    Patient: Nicole Boudreaux MRN: [id number]  SSN: [id number]    YOB: 1995  Age: 27 y.o. Sex: female      Subjective:     Postpartum Day: #16 s/p Primary LTCS for FTP. Readmitted for vaginal bleeding. The patient is without complaints. The patient is on bedrest currently and is using a bedpan. The patient is NPO. Objective:      Patient Vitals for the past 8 hrs:   BP Temp Pulse Resp SpO2   11/30/21 0820 123/80 98 °F (36.7 °C) 72 17 98 %   11/30/21 0615 123/66 98.5 °F (36.9 °C) 78 -- 100 %   11/30/21 0515 117/81 99.1 °F (37.3 °C) 80 -- 97 %   11/30/21 0400 118/72 98.8 °F (37.1 °C) 78 18 100 %   11/30/21 0300 123/66 98.9 °F (37.2 °C) 81 21 99 %     LABS: Recent Results (from the past 24 hour(s))   CBC WITH AUTOMATED DIFF    Collection Time: 11/29/21 10:04 PM   Result Value Ref Range    WBC 16.9 (H) 4.6 - 13.2 K/uL    RBC 2.09 (L) 4.20 - 5.30 M/uL    HGB 6.1 (L) 12.0 - 16.0 g/dL    HCT 19.7 (L) 35.0 - 45.0 %    MCV 94.3 78.0 - 100.0 FL    MCH 29.2 24.0 - 34.0 PG    MCHC 31.0 31.0 - 37.0 g/dL    RDW 14.8 (H) 11.6 - 14.5 %    PLATELET 770 (H) 534 - 420 K/uL    MPV 8.6 (L) 9.2 - 11.8 FL    NRBC 0.5 (H) 0  WBC    ABSOLUTE NRBC 0.08 (H) 0.00 - 0.01 K/uL    NEUTROPHILS 76 (H) 40 - 73 %    LYMPHOCYTES 14 (L) 21 - 52 %    MONOCYTES 4 3 - 10 %    EOSINOPHILS 1 0 - 5 %    BASOPHILS 0 0 - 2 %    IMMATURE GRANULOCYTES 5 (H) 0.0 - 0.5 %    ABS. NEUTROPHILS 12.9 (H) 1.8 - 8.0 K/UL    ABS. LYMPHOCYTES 2.3 0.9 - 3.6 K/UL    ABS. MONOCYTES 0.7 0.05 - 1.2 K/UL    ABS. EOSINOPHILS 0.1 0.0 - 0.4 K/UL    ABS. BASOPHILS 0.1 0.0 - 0.1 K/UL    ABS. IMM.  GRANS. 0.9 (H) 0.00 - 0.04 K/UL    DF AUTOMATED     METABOLIC PANEL, COMPREHENSIVE    Collection Time: 11/29/21 10:04 PM   Result Value Ref Range    Sodium 141 136 - 145 mmol/L    Potassium 3.1 (L) 3.5 - 5.5 mmol/L    Chloride 110 100 - 111 mmol/L    CO2 25 21 - 32 mmol/L    Anion gap 6 3.0 - 18 mmol/L    Glucose 120 (H) 74 - 99 mg/dL    BUN 10 7.0 - 18 MG/DL    Creatinine 0.64 0.6 - 1.3 MG/DL    BUN/Creatinine ratio 16 12 - 20      GFR est AA >60 >60 ml/min/1.73m2    GFR est non-AA >60 >60 ml/min/1.73m2    Calcium 8.2 (L) 8.5 - 10.1 MG/DL    Bilirubin, total 0.1 (L) 0.2 - 1.0 MG/DL    ALT (SGPT) 18 13 - 56 U/L    AST (SGOT) 11 10 - 38 U/L    Alk. phosphatase 79 45 - 117 U/L    Protein, total 5.4 (L) 6.4 - 8.2 g/dL    Albumin 2.0 (L) 3.4 - 5.0 g/dL    Globulin 3.4 2.0 - 4.0 g/dL    A-G Ratio 0.6 (L) 0.8 - 1.7     MAGNESIUM    Collection Time: 11/29/21 10:04 PM   Result Value Ref Range    Magnesium 1.6 1.6 - 2.6 mg/dL   PROTHROMBIN TIME + INR    Collection Time: 11/29/21 10:04 PM   Result Value Ref Range    Prothrombin time 14.8 11.5 - 15.2 sec    INR 1.2 0.8 - 1.2     PTT    Collection Time: 11/29/21 10:04 PM   Result Value Ref Range    aPTT 47.8 (H) 23.0 - 36.4 SEC   D DIMER    Collection Time: 11/29/21 10:04 PM   Result Value Ref Range    D DIMER 10.13 (H) <0.46 ug/ml(FEU)   TYPE & SCREEN    Collection Time: 11/29/21 10:04 PM   Result Value Ref Range    Crossmatch Expiration 12/02/2021,2359     ABO/Rh(D) [facility] POSITIVE     Antibody screen NEG     Unit number [id number]     Blood component type RC LR     Unit division 00     Status of unit ISSUED     Crossmatch result Compatible    EKG, 12 LEAD, INITIAL    Collection Time: 11/29/21 10:36 PM   Result Value Ref Range    Ventricular Rate 90 BPM    Atrial Rate 90 BPM    P-R Interval 128 ms    QRS Duration 70 ms    Q-T Interval 374 ms    QTC Calculation (Bezet) 457 ms    Calculated P Axis 47 degrees    Calculated R Axis 17 degrees    Calculated T Axis 71 degrees    Diagnosis       Normal sinus rhythm  Normal ECG  No previous ECGs available     RBC, ALLOCATE    Collection Time: 11/29/21 11:45 PM   Result Value Ref Range    HISTORY CHECKED? Historical check performed           Lochia:  appropriate   Uterine Fundus:   firm -3     Lab/Data Review: All lab results for the last 24 hours reviewed.     Assessment:   S/P tranfusion of 1 unit PRBC's;  HGB from 6.1 to 7.3 today. Bleeding decreased with no clots       Plan:     Increase ambulation with assistance  OOB to void  Continue Zosyn every 6 hours  CBC and CMP in the morning   Continue PO KCL and Ferrous sulfate.     Progress to regular diet pending ambulation and normal lochia        Signed By: [address]     November 30, 2021

## 2021-12-01 VITALS
TEMPERATURE: 97.9 F | DIASTOLIC BLOOD PRESSURE: 79 MMHG | SYSTOLIC BLOOD PRESSURE: 121 MMHG | WEIGHT: 162 LBS | RESPIRATION RATE: 17 BRPM | OXYGEN SATURATION: 100 % | HEART RATE: 62 BPM | HEIGHT: 64 IN | BODY MASS INDEX: 27.66 KG/M2

## 2021-12-01 LAB
ABO + RH BLD: NORMAL
ALBUMIN SERPL-MCNC: 2.1 G/DL (ref 3.4–5)
ALBUMIN/GLOB SERPL: 0.7 {RATIO} (ref 0.8–1.7)
ALP SERPL-CCNC: 82 U/L (ref 45–117)
ALT SERPL-CCNC: 17 U/L (ref 13–56)
ANION GAP SERPL CALC-SCNC: 6 MMOL/L (ref 3–18)
AST SERPL-CCNC: 9 U/L (ref 10–38)
BACTERIA SPEC CULT: NORMAL
BASOPHILS # BLD: 0 K/UL (ref 0–0.1)
BASOPHILS NFR BLD: 0 % (ref 0–2)
BILIRUB SERPL-MCNC: 0.1 MG/DL (ref 0.2–1)
BLD PROD TYP BPU: NORMAL
BLD PROD TYP BPU: NORMAL
BLOOD GROUP ANTIBODIES SERPL: NORMAL
BPU ID: NORMAL
BPU ID: NORMAL
BUN SERPL-MCNC: 7 MG/DL (ref 7–18)
BUN/CREAT SERPL: 14 (ref 12–20)
CALCIUM SERPL-MCNC: 8.3 MG/DL (ref 8.5–10.1)
CALLED TO:,BCALL1: NORMAL
CHLORIDE SERPL-SCNC: 110 MMOL/L (ref 100–111)
CO2 SERPL-SCNC: 26 MMOL/L (ref 21–32)
CREAT SERPL-MCNC: 0.5 MG/DL (ref 0.6–1.3)
CROSSMATCH RESULT,%XM: NORMAL
DIFFERENTIAL METHOD BLD: ABNORMAL
EOSINOPHIL # BLD: 0.1 K/UL (ref 0–0.4)
EOSINOPHIL NFR BLD: 1 % (ref 0–5)
ERYTHROCYTE [DISTWIDTH] IN BLOOD BY AUTOMATED COUNT: 15.2 % (ref 11.6–14.5)
GLOBULIN SER CALC-MCNC: 3.2 G/DL (ref 2–4)
GLUCOSE SERPL-MCNC: 81 MG/DL (ref 74–99)
HCT VFR BLD AUTO: 21.5 % (ref 35–45)
HGB BLD-MCNC: 6.7 G/DL (ref 12–16)
IMM GRANULOCYTES # BLD AUTO: 0.8 K/UL (ref 0–0.04)
IMM GRANULOCYTES NFR BLD AUTO: 7 % (ref 0–0.5)
LYMPHOCYTES # BLD: 2.4 K/UL (ref 0.9–3.6)
LYMPHOCYTES NFR BLD: 22 % (ref 21–52)
MCH RBC QN AUTO: 29.5 PG (ref 24–34)
MCHC RBC AUTO-ENTMCNC: 31.2 G/DL (ref 31–37)
MCV RBC AUTO: 94.7 FL (ref 78–100)
MONOCYTES # BLD: 0.7 K/UL (ref 0.05–1.2)
MONOCYTES NFR BLD: 7 % (ref 3–10)
NEUTS SEG # BLD: 6.6 K/UL (ref 1.8–8)
NEUTS SEG NFR BLD: 62 % (ref 40–73)
NRBC # BLD: 0.03 K/UL (ref 0–0.01)
NRBC BLD-RTO: 0.3 PER 100 WBC
PLATELET # BLD AUTO: 583 K/UL (ref 135–420)
PMV BLD AUTO: 8.4 FL (ref 9.2–11.8)
POTASSIUM SERPL-SCNC: 4.3 MMOL/L (ref 3.5–5.5)
PROT SERPL-MCNC: 5.3 G/DL (ref 6.4–8.2)
RBC # BLD AUTO: 2.27 M/UL (ref 4.2–5.3)
SERVICE CMNT-IMP: NORMAL
SODIUM SERPL-SCNC: 142 MMOL/L (ref 136–145)
SPECIMEN EXP DATE BLD: NORMAL
STATUS OF UNIT,%ST: NORMAL
STATUS OF UNIT,%ST: NORMAL
UNIT DIVISION, %UDIV: 0
UNIT DIVISION, %UDIV: 0
WBC # BLD AUTO: 10.5 K/UL (ref 4.6–13.2)

## 2021-12-01 PROCEDURE — 74011250636 HC RX REV CODE- 250/636: Performed by: MIDWIFE

## 2021-12-01 PROCEDURE — 74011250637 HC RX REV CODE- 250/637

## 2021-12-01 PROCEDURE — 74011250637 HC RX REV CODE- 250/637: Performed by: MIDWIFE

## 2021-12-01 PROCEDURE — 85025 COMPLETE CBC W/AUTO DIFF WBC: CPT

## 2021-12-01 PROCEDURE — 36415 COLL VENOUS BLD VENIPUNCTURE: CPT

## 2021-12-01 PROCEDURE — 80053 COMPREHEN METABOLIC PANEL: CPT

## 2021-12-01 PROCEDURE — 74011000258 HC RX REV CODE- 258: Performed by: MIDWIFE

## 2021-12-01 RX ORDER — METHYLERGONOVINE MALEATE 0.2 MG/1
0.2 TABLET ORAL EVERY 6 HOURS
Qty: 20 TABLET | Refills: 0 | Status: SHIPPED | OUTPATIENT
Start: 2021-12-01 | End: 2021-12-06

## 2021-12-01 RX ADMIN — PIPERACILLIN AND TAZOBACTAM 3.38 G: 3; .375 INJECTION, POWDER, LYOPHILIZED, FOR SOLUTION INTRAVENOUS at 00:50

## 2021-12-01 RX ADMIN — PIPERACILLIN AND TAZOBACTAM 3.38 G: 3; .375 INJECTION, POWDER, LYOPHILIZED, FOR SOLUTION INTRAVENOUS at 12:34

## 2021-12-01 RX ADMIN — METHYLERGONOVINE 200 MCG: 0.2 TABLET ORAL at 12:34

## 2021-12-01 RX ADMIN — FERROUS SULFATE TAB 325 MG (65 MG ELEMENTAL FE) 325 MG: 325 (65 FE) TAB at 09:00

## 2021-12-01 RX ADMIN — POTASSIUM CHLORIDE 20 MEQ: 1500 TABLET, EXTENDED RELEASE ORAL at 09:00

## 2021-12-01 RX ADMIN — METHYLERGONOVINE 200 MCG: 0.2 TABLET ORAL at 00:51

## 2021-12-01 RX ADMIN — METHYLERGONOVINE 200 MCG: 0.2 TABLET ORAL at 06:38

## 2021-12-01 RX ADMIN — PIPERACILLIN AND TAZOBACTAM 3.38 G: 3; .375 INJECTION, POWDER, LYOPHILIZED, FOR SOLUTION INTRAVENOUS at 06:38

## 2021-12-01 RX ADMIN — IBUPROFEN 600 MG: 600 TABLET, FILM COATED ORAL at 09:00

## 2021-12-01 RX ADMIN — Medication 10 ML: at 00:50

## 2021-12-01 RX ADMIN — Medication 10 ML: at 06:39

## 2021-12-01 NOTE — PROGRESS NOTES
DC Plan: home with IV abx     Care manager met with patient who stated that she has already had delivered [facility] abx, she and her  have had teaching regarding home infusion and patient states she is comfortable with doing this today; CM has sent updated infusion orders via CMS to [facility] and patient understands that start of care for [facility] will be tomorrow. Patient is clear for discharge when ready. Care Management Interventions  PCP Verified by CM: Yes  Palliative Care Criteria Met (RRAT>21 & CHF Dx)?: No  Mode of Transport at Discharge:  Other (see comment) (spouse)  Support Systems: Spouse/Significant Other  Confirm Follow Up Transport: Family

## 2021-12-01 NOTE — PROGRESS NOTES
0710 Bedside and Verbal shift change report given to CHRISTELLE Snow RN (oncoming nurse) by THANH Ayala RN (offgoing nurse). Report included the following information SBAR, Kardex, Intake/Output, MAR and Recent Results. 0745 pt needed water    0900 scheduled meds given and shift assesment complete    1050 patient has no needs or concerns at this time    1115 case management called regarding when they would be seeing patient, left voicemail    1234 med given and antibiotic infusing    1309 Discharge education complete and e-signatures obtained. Waiting for Patient to call to be taken downstairs.     1345 Patient discharged home

## 2021-12-01 NOTE — PROGRESS NOTES
1910: Bedside and Verbal shift change report given to THANH Casey, TIFFANI (oncoming nurse) by Gogo Diop RN (offgoing nurse). Report included the following information SBAR, Kardex, Procedure Summary, Intake/Output, MAR and Recent Results. 0710: Bedside and Verbal shift change report given to CHRISTELLE Diop RN (oncoming nurse) by THANH Casey RN (offgoing nurse). Report included the following information SBAR, Kardex, Procedure Summary, Intake/Output, MAR and Recent Results.

## 2021-12-01 NOTE — DISCHARGE INSTRUCTIONS
Patient Education   Anemia: Care Instructions  Your Care Instructions     Anemia is a low level of red blood cells, which carry oxygen throughout your body. Many things can cause anemia. Lack of iron is one of the most common causes. Your body needs iron to make hemoglobin, a substance in red blood cells that carries oxygen from the lungs to your body's cells. Without enough iron, the body produces fewer and smaller red blood cells. As a result, your body's cells do not get enough oxygen, and you feel tired and weak. And you may have trouble concentrating. Bleeding is the most common cause of a lack of iron. You may have heavy menstrual bleeding or bleeding caused by conditions such as ulcers, hemorrhoids, or cancer. Regular use of aspirin or other anti-inflammatory medicines (such as ibuprofen) also can cause bleeding in some people. A lack of iron in your diet also can cause anemia, especially at times when the body needs more iron, such as during pregnancy, infancy, and the teen years. Your doctor may have prescribed iron pills. It may take several months of treatment for your iron levels to return to normal. Your doctor also may suggest that you eat foods that are rich in iron, such as meat and beans. There are many other causes of anemia. It is not always due to a lack of iron. Finding the specific cause of your anemia will help your doctor find the right treatment for you. Follow-up care is a key part of your treatment and safety. Be sure to make and go to all appointments, and call your doctor if you are having problems. It's also a good idea to know your test results and keep a list of the medicines you take. How can you care for yourself at home? · Take your medicines exactly as prescribed. Call your doctor if you think you are having a problem with your medicine.   · If your doctor recommends iron pills, take them as directed:  ? Try to take the pills on an empty stomach about 1 hour before or 2 hours after meals. But you may need to take iron with food to avoid an upset stomach. ? Do not take antacids or drink milk or caffeine drinks (such as coffee, tea, or cola) at the same time or within 2 hours of the time that you take your iron. They can make it hard for your body to absorb the iron. ? Vitamin C (from food or supplements) helps your body absorb iron. Try taking iron pills with a glass of orange juice or some other food that is high in vitamin C, such as citrus fruits. ? Iron pills may cause stomach problems, such as heartburn, nausea, diarrhea, constipation, and cramps. Be sure to drink plenty of fluids, and include fruits, vegetables, and fiber in your diet each day. Iron pills often make your bowel movements dark or green. ? If you forget to take an iron pill, do not take a double dose of iron the next time you take a pill. ? Keep iron pills out of the reach of small children. An overdose of iron can be very dangerous. · Follow your doctor's advice about eating iron-rich foods. These include red meat, shellfish, poultry, eggs, beans, raisins, whole-grain bread, and leafy green vegetables. · Steam vegetables to help them keep their iron content. When should you call for help? Call 911 anytime you think you may need emergency care. For example, call if:    · You have symptoms of a heart attack. These may include:  ? Chest pain or pressure, or a strange feeling in the chest.  ? Sweating. ? Shortness of breath. ? Nausea or vomiting. ? Pain, pressure, or a strange feeling in the back, neck, jaw, or upper belly or in one or both shoulders or arms. ? Lightheadedness or sudden weakness. ? A fast or irregular heartbeat. After you call 911, the  may tell you to chew 1 adult-strength or 2 to 4 low-dose aspirin. Wait for an ambulance. Do not try to drive yourself.     · You passed out (lost consciousness).    Call your doctor now or seek immediate medical care if:    · You have new or increased shortness of breath.     · You are dizzy or lightheaded, or you feel like you may faint.     · Your fatigue and weakness continue or get worse.     · You have any abnormal bleeding, such as:  ? Nosebleeds. ? Vaginal bleeding that is different (heavier, more frequent, at a different time of the month) than what you are used to.  ? Bloody or black stools, or rectal bleeding. ? Bloody or pink urine. Watch closely for changes in your health, and be sure to contact your doctor if:    · You do not get better as expected. Where can you learn more? Go to http://www.smith.com/  Enter R301 in the search box to learn more about \"Anemia: Care Instructions. \"  Current as of: April 29, 2021               Content Version: 13.0  © 2006-2021 [employer]. Care instructions adapted under license by [employer] (which disclaims liability or warranty for this information). If you have questions about a medical condition or this instruction, always ask your healthcare professional. [place] [address] any warranty or liability for your use of this information. Patient Education      Peripherally Inserted Central Catheter [facility]): Care Instructions  Your Care Instructions     A peripherally inserted central catheter (PICC) is a soft, flexible tube that runs under your skin from a vein in your arm to a large vein near your heart. One end of the catheter stays outside your body. It is a type of central venous catheter, or central venous line. You may have it for weeks or months. A PICC is used to give you medicine, blood products, nutrients, or fluids. A PICC makes doing these things more comfortable for you because they are put directly into the catheter. So you will not be stuck with a needle every time. A PICC may be used to draw blood for tests only if another vein, such as in the hand or arm, can't be used.  The end of the PICC sometimes has two or three openings so that you can get more than one type of fluid or medicine at a time. Your doctor may give you medicine to make you feel relaxed. You may feel a little pain when your doctor numbs your arm. Your doctor will then thread the catheter up a vein in your arm to a larger vein. You will not feel any pain. The doctor may use stitches or other devices to hold the catheter in place where it exits your arm. After the procedure, the site may be sore for a day or two. Follow-up care is a key part of your treatment and safety. Be sure to make and go to all appointments, and call your doctor if you are having problems. It's also a good idea to know your test results and keep a list of the medicines you take. How can you care for yourself at home? · Do not wear jewelry, such as necklaces, that can catch on the catheter. · If the catheter breaks, follow the instructions your doctor gave you. If you have no instructions, clamp or tie off the catheter. Then see a doctor as soon as possible. · To help prevent infection, take a shower instead of a bath. Do not go swimming with the catheter. · Try to keep the area dry. When you shower, cover the area with waterproof material, such as plastic wrap. · Never touch the open end of the catheter if the cap is off. · Never use scissors, knives, pins, or other sharp objects near the catheter or other tubing. · If your catheter has a clamp, keep it clamped when you are not using it. · Fasten or tape the catheter to your body to prevent pulling or dangling. · Avoid clothing that rubs or pulls on your catheter. · Avoid bending or crimping your catheter. · Always wash your hands before you touch your catheter. · Wear loose clothing over the catheter for the first 10 to 14 days. When getting dressed, be careful not to pull on the catheter. How to change the dressing  Since the PICC is in one of your arms, you won't be able to change the dressing on your own.  Harsha York need someone to help you change the dressing using the same instructions that your doctor or nurse gave you. Your PICC dressing should be changed at least once a week. If the dressing gets loose, wet, or dirty, it must be changed more often to prevent infection. Your doctor may also give you instructions for when to change the dressing. Be sure you have all your supplies ready. These include medical tape, a surgical mask, sterile gloves, and your dressing kit. The names and brands of the items will vary. Your doctor or nurse may give you specific instructions for changing the dressing. Here are basic tips for how to change the dressing. 1. Wash your hands with soap and water. Do this for 15 seconds. Dry them with paper towels. 2. Put on the surgical mask. 3. Loosen and remove your old dressing. Peel it toward the PICC, not away from it. You may need to use an adhesive remover if it doesn't come off easily. 4. Look at the site carefully for redness, swelling, drainage, tenderness, or warmth. If you notice any of these, call your doctor. 5. Wash your hands again. 6. Open your dressing kit, and put on the sterile gloves. 7. Clean the site with the supplies in the dressing kit. 8. Use the dressing that your doctor gave you, and place it over the site. 9. Tape the PICC tubing to your skin. Make sure it doesn't dangle or pull. When should you call for help? Call 911 anytime you think you may need emergency care. For example, call if:    · You passed out (lost consciousness).     · You have severe trouble breathing.     · You have sudden chest pain and shortness of breath, or you cough up blood.     · You have a fast or uneven pulse. Call your doctor now or seek immediate medical care if:    · You have signs of infection, such as:  ? Increased pain, swelling, warmth, or redness. ? Red streaks leading from the area. ? Pus or blood draining from the area.   ? A fever.     · You have swelling in your face, chest, neck, or arm on the side where the catheter is.     · You have signs of a blood clot, such as bulging veins near the catheter.     · Your catheter is leaking, cracked, or clogged.     · You feel resistance when you inject medicine or fluids into your catheter.     · Your catheter is out of place. This may happen after severe coughing or vomiting, or if you pull on the catheter.     · You have chest pain or shortness of breath. Watch closely for changes in your health, and be sure to contact your doctor if:    · You have any concerns about your catheter. Where can you learn more? Go to http://www.gray.com/  Enter L935 in the search box to learn more about \"Peripherally Inserted Central Catheter (PICC): Care Instructions. \"  Current as of: July 1, 2021               Content Version: 13.0  © 9322-3005 [employer]. Care instructions adapted under license by [employer] (which disclaims liability or warranty for this information). If you have questions about a medical condition or this instruction, always ask your healthcare professional. [address] any warranty or liability for your use of this information.

## 2021-12-01 NOTE — PROGRESS NOTES
Problem: Pain  Goal: *Control of Pain  Outcome: Progressing Towards Goal     Problem: Patient Education: Go to Patient Education Activity  Goal: Patient/Family Education  Outcome: Progressing Towards Goal     Problem: Falls - Risk of  Goal: *Absence of Falls  Description: Document Rosalinda Shepherd Fall Risk and appropriate interventions in the flowsheet. Outcome: Progressing Towards Goal  Note: Fall Risk Interventions:  Mobility Interventions:  (pt on bedrest)              Elimination Interventions: Patient to call for help with toileting needs, Toilet paper/wipes in reach, Call light in reach              Problem: Patient Education: Go to Patient Education Activity  Goal: Patient/Family Education  Outcome: Progressing Towards Goal     Problem: Pressure Injury - Risk of  Goal: *Prevention of pressure injury  Description: Document Salvador Scale and appropriate interventions in the flowsheet. Outcome: Progressing Towards Goal  Note: Pressure Injury Interventions:             Activity Interventions: Pressure redistribution bed/mattress(bed type), Increase time out of bed    Mobility Interventions: Pressure redistribution bed/mattress (bed type)    Nutrition Interventions: Document food/fluid/supplement intake, Offer support with meals,snacks and hydration                     Problem: Patient Education: Go to Patient Education Activity  Goal: Patient/Family Education  Outcome: Progressing Towards Goal

## 2021-12-02 NOTE — PROGRESS NOTES
Post-Operative  Day 16    Karina Hsieh       Assessment:   Hospital Problems  Date Reviewed: 2021          Codes Class Noted POA    Anemia due to acute blood loss ICD-10-CM: D62  ICD-9-CM: 285.1  2021 No        Wound infection ICD-10-CM: T14. 8XXA, L08.9  ICD-9-CM: 958.3  2021 Yes        Postpartum hemorrhage ICD-10-CM: O72.1  ICD-9-CM: 666.10  2021 Yes        Shock ([address] ICD-10-CM: R57.9  ICD-9-CM: 785.50  2021 Yes        Sepsis ([address] ICD-10-CM: A41.9  ICD-9-CM: 038.9, 995.91  2021 Yes        Pleural effusion, bilateral ICD-10-CM: J90  ICD-9-CM: 511.9  2021 Yes        Postoperative complication ZKD-17-HI: Y45. 9XXA  ICD-9-CM: 998.9  2021 Yes        Fever ICD-10-CM: R50.9  ICD-9-CM: 780.60  2021 Yes        UTI (urinary tract infection) ICD-10-CM: N39.0  ICD-9-CM: 599.0  2021 Yes            Post-Op day 17, doing well    Plan:   1. Discharge home today  2. Follow up in office in 1 week  3. Post partum activity/wound care advised, diet as tolerated  4. Discharge Medications: IV zosyn and methergine  5. Will arrange for iron infusions as outpt. Patient doing well without significant complaint. Tolerating diet, passing flatus, voiding and ambulating without difficulty. No current facility-administered medications for this encounter. Vitals:  Visit Vitals  /76 (BP 1 Location: Left arm, BP Patient Position: At rest)   Pulse 77   Temp 98.4 °F (36.9 °C)   Resp 17   Ht 5' 4\" (1.626 m)   Wt 74.8 kg (165 lb)   SpO2 100%   BMI 28.32 kg/m²     Temp (24hrs), Av.7 °F (36.5 °C), Min:97.4 °F (36.3 °C), Max:97.9 °F (36.6 °C)        Exam:        Patient without distress. Abdomen, bowel sounds present, soft, expected tenderness, fundus firm     Wound incision clean, dry and intact               Lower extremities are negative for swelling, cords or tenderness.     Labs:   Lab Results   Component Value Date/Time WBC 10.5 12/01/2021 06:45 AM    WBC 15.1 (H) 11/30/2021 10:24 AM    WBC 16.9 (H) 11/29/2021 10:04 PM    HGB 6.7 (L) 12/01/2021 06:45 AM    HGB 7.0 (L) 11/30/2021 10:24 AM    HGB 6.1 (L) 11/29/2021 10:04 PM    HCT 21.5 (L) 12/01/2021 06:45 AM    HCT 21.4 (L) 11/30/2021 10:24 AM    HCT 19.7 (L) 11/29/2021 10:04 PM    PLATELET 271 (H) 55/20/8290 06:45 AM    PLATELET 900 02/48/9383 10:24 AM    PLATELET 829 (H) 47/51/6996 10:04 PM       Recent Results (from the past 24 hour(s))   CBC WITH AUTOMATED DIFF    Collection Time: 12/01/21  6:45 AM   Result Value Ref Range    WBC 10.5 4.6 - 13.2 K/uL    RBC 2.27 (L) 4.20 - 5.30 M/uL    HGB 6.7 (L) 12.0 - 16.0 g/dL    HCT 21.5 (L) 35.0 - 45.0 %    MCV 94.7 78.0 - 100.0 FL    MCH 29.5 24.0 - 34.0 PG    MCHC 31.2 31.0 - 37.0 g/dL    RDW 15.2 (H) 11.6 - 14.5 %    PLATELET 333 (H) 597 - 420 K/uL    MPV 8.4 (L) 9.2 - 11.8 FL    NRBC 0.3 (H) 0  WBC    ABSOLUTE NRBC 0.03 (H) 0.00 - 0.01 K/uL    NEUTROPHILS 62 40 - 73 %    LYMPHOCYTES 22 21 - 52 %    MONOCYTES 7 3 - 10 %    EOSINOPHILS 1 0 - 5 %    BASOPHILS 0 0 - 2 %    IMMATURE GRANULOCYTES 7 (H) 0.0 - 0.5 %    ABS. NEUTROPHILS 6.6 1.8 - 8.0 K/UL    ABS. LYMPHOCYTES 2.4 0.9 - 3.6 K/UL    ABS. MONOCYTES 0.7 0.05 - 1.2 K/UL    ABS. EOSINOPHILS 0.1 0.0 - 0.4 K/UL    ABS. BASOPHILS 0.0 0.0 - 0.1 K/UL    ABS. IMM.  GRANS. 0.8 (H) 0.00 - 0.04 K/UL    DF AUTOMATED     METABOLIC PANEL, COMPREHENSIVE    Collection Time: 12/01/21  6:45 AM   Result Value Ref Range    Sodium 142 136 - 145 mmol/L    Potassium 4.3 3.5 - 5.5 mmol/L    Chloride 110 100 - 111 mmol/L    CO2 26 21 - 32 mmol/L    Anion gap 6 3.0 - 18 mmol/L    Glucose 81 74 - 99 mg/dL    BUN 7 7.0 - 18 MG/DL    Creatinine 0.50 (L) 0.6 - 1.3 MG/DL    BUN/Creatinine ratio 14 12 - 20      GFR est AA >60 >60 ml/min/1.73m2    GFR est non-AA >60 >60 ml/min/1.73m2    Calcium 8.3 (L) 8.5 - 10.1 MG/DL    Bilirubin, total 0.1 (L) 0.2 - 1.0 MG/DL    ALT (SGPT) 17 13 - 56 U/L    AST (SGOT) 9 (L) 10 - 38 U/L    Alk.  phosphatase 82 45 - 117 U/L    Protein, total 5.3 (L) 6.4 - 8.2 g/dL    Albumin 2.1 (L) 3.4 - 5.0 g/dL    Globulin 3.2 2.0 - 4.0 g/dL    A-G Ratio 0.7 (L) 0.8 - 1.7       Nile Truong MD

## 2021-12-02 NOTE — DISCHARGE SUMMARY
Obstetrical Discharge Summary     Name: Gianfranco Mejia MRN: [id number]  SSN: [id number]    YOB: 1995  Age: 27 y.o. Sex: female      Admit Date: 11/25/2021    Discharge Date: 12/1/2021    Admitting Physician: Jhoan Hewitt MD     Attending Physician:  Gilson Fontana MD    Discharge Diagnoses: Additional Diagnoses:   Problem List as of 11/29/2021 Date Reviewed: 11/29/2021          Codes Class Noted - Resolved    Bleeding postpartum ICD-10-CM: O72.1  ICD-9-CM: 666.10  11/29/2021 - Present        Severe anemia ICD-10-CM: D64.9  ICD-9-CM: 285.9  11/29/2021 - Present        Endometritis following delivery ICD-10-CM: O86.12  ICD-9-CM: 670.12  11/29/2021 - Present        Anemia due to acute blood loss ICD-10-CM: D62  ICD-9-CM: 285.1  11/28/2021 - Present        Wound infection ICD-10-CM: T14. 8XXA, L08.9  ICD-9-CM: 958.3  11/26/2021 - Present        Postpartum hemorrhage ICD-10-CM: O72.1  ICD-9-CM: 666.10  11/25/2021 - Present        Shock ([facility] [address] ICD-10-CM: R57.9  ICD-9-CM: 785.50  11/25/2021 - Present        Sepsis ([facility] [address] ICD-10-CM: A41.9  ICD-9-CM: 038.9, 995.91  11/25/2021 - Present        Pleural effusion, bilateral ICD-10-CM: J90  ICD-9-CM: 511.9  11/25/2021 - Present        Postoperative complication JQG-30-XM: L81. 9XXA  ICD-9-CM: 998.9  11/25/2021 - Present        Fever ICD-10-CM: R50.9  ICD-9-CM: 780.60  11/25/2021 - Present        Pulmonary edema ICD-10-CM: J81.1  ICD-9-CM: 418  11/23/2021 - Present        UTI (urinary tract infection) ICD-10-CM: N39.0  ICD-9-CM: 599.0  11/22/2021 - Present        Anemia ICD-10-CM: D64.9  ICD-9-CM: 285.9  11/22/2021 - Present        Leukocytosis ICD-10-CM: D72.829  ICD-9-CM: 288.60  11/22/2021 - Present        Hypokalemia ICD-10-CM: E87.6  ICD-9-CM: 276.8  11/22/2021 - Present        At risk for breastfeeding difficulty ICD-10-CM: Z91.89  ICD-9-CM: V15.89  11/22/2021 - Present            No results found for: RUBELLAEXT, GRBSEXT  Recent Labs 12/01/21  0645   [facility] Course: Patient was readmitted with postpartum hemorrhage. Patient was again given blood transfusion for severe anemia and Methergine to help control the bleeding. Her bleeding stabilized. She continued her zosyn IV for treatment of Pseudomonas infection. Pt was discharged to home in stable condition. Patient Instructions: Follow up in 1 week. Reference my discharge instructions. No orders of the defined types were placed in this encounter.        Signed By:  Emma Morataya MD     December 1, 2021                       BST

## 2022-03-18 PROBLEM — L08.9 WOUND INFECTION: Status: ACTIVE | Noted: 2021-11-26

## 2022-03-18 PROBLEM — J81.1 PULMONARY EDEMA: Status: ACTIVE | Noted: 2021-11-23

## 2022-03-18 PROBLEM — T14.8XXA WOUND INFECTION: Status: ACTIVE | Noted: 2021-11-26

## 2022-03-19 PROBLEM — A41.9 SEPSIS (HCC): Status: ACTIVE | Noted: 2021-11-25

## 2022-03-19 PROBLEM — N39.0 UTI (URINARY TRACT INFECTION): Status: ACTIVE | Noted: 2021-11-22

## 2022-03-19 PROBLEM — D72.829 LEUKOCYTOSIS: Status: ACTIVE | Noted: 2021-11-22

## 2022-03-19 PROBLEM — E87.6 HYPOKALEMIA: Status: ACTIVE | Noted: 2021-11-22

## 2022-03-19 PROBLEM — R57.9 SHOCK: Status: ACTIVE | Noted: 2021-11-25

## 2022-03-19 PROBLEM — D64.9 ANEMIA: Status: ACTIVE | Noted: 2021-11-22

## 2022-03-19 PROBLEM — D62 ANEMIA DUE TO ACUTE BLOOD LOSS: Status: ACTIVE | Noted: 2021-11-28

## 2022-03-19 PROBLEM — T81.9XXA POSTOPERATIVE COMPLICATION: Status: ACTIVE | Noted: 2021-11-25

## 2022-03-19 PROBLEM — R50.9 FEVER: Status: ACTIVE | Noted: 2021-11-25

## 2022-03-19 PROBLEM — Z91.89 AT RISK FOR BREASTFEEDING DIFFICULTY: Status: ACTIVE | Noted: 2021-11-22

## 2022-03-20 PROBLEM — D64.9 SEVERE ANEMIA: Status: ACTIVE | Noted: 2021-11-29

## 2022-03-20 PROBLEM — J90 PLEURAL EFFUSION, BILATERAL: Status: ACTIVE | Noted: 2021-11-25

## 2025-01-14 ENCOUNTER — APPOINTMENT (OUTPATIENT)
Facility: HOSPITAL | Age: 30
End: 2025-01-14
Payer: COMMERCIAL

## 2025-01-14 ENCOUNTER — HOSPITAL ENCOUNTER (OUTPATIENT)
Facility: HOSPITAL | Age: 30
Setting detail: OBSERVATION
Discharge: HOME | End: 2025-01-15
Attending: EMERGENCY MEDICINE | Admitting: HOSPITALIST
Payer: COMMERCIAL

## 2025-01-14 DIAGNOSIS — K83.1 COMMON BILE DUCT OBSTRUCTION: ICD-10-CM

## 2025-01-14 DIAGNOSIS — K80.01 CALCULUS OF GALLBLADDER WITH ACUTE CHOLECYSTITIS AND OBSTRUCTION: Primary | ICD-10-CM

## 2025-01-14 PROBLEM — K81.0 ACUTE CHOLECYSTITIS: Status: ACTIVE | Noted: 2025-01-14

## 2025-01-14 PROBLEM — K81.0 ACUTE CHOLECYSTITIS: Status: RESOLVED | Noted: 2025-01-14 | Resolved: 2025-01-14

## 2025-01-14 PROBLEM — R79.89 ELEVATED LFTS: Status: ACTIVE | Noted: 2025-01-14

## 2025-01-14 LAB
ALBUMIN SERPL-MCNC: 4 G/DL (ref 3.4–5)
ALBUMIN/GLOB SERPL: 1.2 (ref 0.8–1.7)
ALP SERPL-CCNC: 223 U/L (ref 45–117)
ALT SERPL-CCNC: 167 U/L (ref 13–56)
ANION GAP SERPL CALC-SCNC: 6 MMOL/L (ref 3–18)
AST SERPL-CCNC: 180 U/L (ref 10–38)
BASOPHILS # BLD: 0.03 K/UL (ref 0–0.1)
BASOPHILS NFR BLD: 0.3 % (ref 0–2)
BILIRUB DIRECT SERPL-MCNC: 0.5 MG/DL (ref 0–0.2)
BILIRUB SERPL-MCNC: 1 MG/DL (ref 0.2–1)
BUN SERPL-MCNC: 14 MG/DL (ref 7–18)
BUN/CREAT SERPL: 19 (ref 12–20)
CALCIUM SERPL-MCNC: 9 MG/DL (ref 8.5–10.1)
CHLORIDE SERPL-SCNC: 107 MMOL/L (ref 100–111)
CO2 SERPL-SCNC: 25 MMOL/L (ref 21–32)
CREAT SERPL-MCNC: 0.75 MG/DL (ref 0.6–1.3)
DIFFERENTIAL METHOD BLD: ABNORMAL
EOSINOPHIL # BLD: 0.02 K/UL (ref 0–0.4)
EOSINOPHIL NFR BLD: 0.2 % (ref 0–5)
ERYTHROCYTE [DISTWIDTH] IN BLOOD BY AUTOMATED COUNT: 14.3 % (ref 11.6–14.5)
GLOBULIN SER CALC-MCNC: 3.3 G/DL (ref 2–4)
GLUCOSE SERPL-MCNC: 127 MG/DL (ref 74–99)
HCT VFR BLD AUTO: 39.8 % (ref 35–45)
HGB BLD-MCNC: 13.1 G/DL (ref 12–16)
IMM GRANULOCYTES # BLD AUTO: 0.03 K/UL (ref 0–0.04)
IMM GRANULOCYTES NFR BLD AUTO: 0.3 % (ref 0–0.5)
LIPASE SERPL-CCNC: 38 U/L (ref 13–75)
LYMPHOCYTES # BLD: 1.4 K/UL (ref 0.9–3.3)
LYMPHOCYTES NFR BLD: 14.2 % (ref 21–52)
MCH RBC QN AUTO: 28.8 PG (ref 24–34)
MCHC RBC AUTO-ENTMCNC: 32.9 G/DL (ref 31–37)
MCV RBC AUTO: 87.5 FL (ref 78–100)
MONOCYTES # BLD: 0.41 K/UL (ref 0.05–1.2)
MONOCYTES NFR BLD: 4.2 % (ref 3–10)
NEUTS SEG # BLD: 7.94 K/UL (ref 1.8–8)
NEUTS SEG NFR BLD: 80.8 % (ref 40–73)
NRBC # BLD: 0 K/UL (ref 0–0.01)
NRBC BLD-RTO: 0 PER 100 WBC
PLATELET # BLD AUTO: 334 K/UL (ref 135–420)
PMV BLD AUTO: 9.4 FL (ref 9.2–11.8)
POTASSIUM SERPL-SCNC: 4.2 MMOL/L (ref 3.5–5.5)
PROT SERPL-MCNC: 7.3 G/DL (ref 6.4–8.2)
RBC # BLD AUTO: 4.55 M/UL (ref 4.2–5.3)
SODIUM SERPL-SCNC: 138 MMOL/L (ref 136–145)
WBC # BLD AUTO: 9.8 K/UL (ref 4.6–13.2)

## 2025-01-14 PROCEDURE — 96372 THER/PROPH/DIAG INJ SC/IM: CPT

## 2025-01-14 PROCEDURE — 96376 TX/PRO/DX INJ SAME DRUG ADON: CPT

## 2025-01-14 PROCEDURE — G0378 HOSPITAL OBSERVATION PER HR: HCPCS

## 2025-01-14 PROCEDURE — 6360000002 HC RX W HCPCS: Performed by: EMERGENCY MEDICINE

## 2025-01-14 PROCEDURE — 74181 MRI ABDOMEN W/O CONTRAST: CPT

## 2025-01-14 PROCEDURE — 6360000002 HC RX W HCPCS: Performed by: HOSPITALIST

## 2025-01-14 PROCEDURE — 80076 HEPATIC FUNCTION PANEL: CPT

## 2025-01-14 PROCEDURE — 83690 ASSAY OF LIPASE: CPT

## 2025-01-14 PROCEDURE — 80048 BASIC METABOLIC PNL TOTAL CA: CPT

## 2025-01-14 PROCEDURE — 2580000003 HC RX 258: Performed by: HOSPITALIST

## 2025-01-14 PROCEDURE — 99285 EMERGENCY DEPT VISIT HI MDM: CPT

## 2025-01-14 PROCEDURE — 96375 TX/PRO/DX INJ NEW DRUG ADDON: CPT

## 2025-01-14 PROCEDURE — 96365 THER/PROPH/DIAG IV INF INIT: CPT

## 2025-01-14 PROCEDURE — 2500000003 HC RX 250 WO HCPCS: Performed by: HOSPITALIST

## 2025-01-14 PROCEDURE — 96374 THER/PROPH/DIAG INJ IV PUSH: CPT

## 2025-01-14 PROCEDURE — 76705 ECHO EXAM OF ABDOMEN: CPT

## 2025-01-14 PROCEDURE — 96366 THER/PROPH/DIAG IV INF ADDON: CPT

## 2025-01-14 PROCEDURE — 85025 COMPLETE CBC W/AUTO DIFF WBC: CPT

## 2025-01-14 RX ORDER — SODIUM CHLORIDE 9 MG/ML
INJECTION, SOLUTION INTRAVENOUS CONTINUOUS
Status: DISPENSED | OUTPATIENT
Start: 2025-01-14 | End: 2025-01-15

## 2025-01-14 RX ORDER — POLYETHYLENE GLYCOL 3350 17 G/17G
17 POWDER, FOR SOLUTION ORAL DAILY PRN
Status: DISCONTINUED | OUTPATIENT
Start: 2025-01-14 | End: 2025-01-15 | Stop reason: HOSPADM

## 2025-01-14 RX ORDER — SODIUM CHLORIDE 9 MG/ML
INJECTION, SOLUTION INTRAVENOUS PRN
Status: DISCONTINUED | OUTPATIENT
Start: 2025-01-14 | End: 2025-01-15 | Stop reason: HOSPADM

## 2025-01-14 RX ORDER — MORPHINE SULFATE 4 MG/ML
4 INJECTION, SOLUTION INTRAMUSCULAR; INTRAVENOUS
Status: COMPLETED | OUTPATIENT
Start: 2025-01-14 | End: 2025-01-14

## 2025-01-14 RX ORDER — MORPHINE SULFATE 2 MG/ML
2 INJECTION, SOLUTION INTRAMUSCULAR; INTRAVENOUS EVERY 4 HOURS PRN
Status: DISCONTINUED | OUTPATIENT
Start: 2025-01-14 | End: 2025-01-15 | Stop reason: HOSPADM

## 2025-01-14 RX ORDER — ONDANSETRON 4 MG/1
4 TABLET, ORALLY DISINTEGRATING ORAL EVERY 8 HOURS PRN
Status: DISCONTINUED | OUTPATIENT
Start: 2025-01-14 | End: 2025-01-15 | Stop reason: HOSPADM

## 2025-01-14 RX ORDER — KETOROLAC TROMETHAMINE 15 MG/ML
15 INJECTION, SOLUTION INTRAMUSCULAR; INTRAVENOUS ONCE
Status: COMPLETED | OUTPATIENT
Start: 2025-01-14 | End: 2025-01-14

## 2025-01-14 RX ORDER — ONDANSETRON 2 MG/ML
4 INJECTION INTRAMUSCULAR; INTRAVENOUS EVERY 6 HOURS PRN
Status: DISCONTINUED | OUTPATIENT
Start: 2025-01-14 | End: 2025-01-15 | Stop reason: HOSPADM

## 2025-01-14 RX ORDER — SODIUM CHLORIDE 0.9 % (FLUSH) 0.9 %
5-40 SYRINGE (ML) INJECTION PRN
Status: DISCONTINUED | OUTPATIENT
Start: 2025-01-14 | End: 2025-01-15 | Stop reason: HOSPADM

## 2025-01-14 RX ORDER — ONDANSETRON 2 MG/ML
4 INJECTION INTRAMUSCULAR; INTRAVENOUS ONCE
Status: COMPLETED | OUTPATIENT
Start: 2025-01-14 | End: 2025-01-14

## 2025-01-14 RX ORDER — SODIUM CHLORIDE 0.9 % (FLUSH) 0.9 %
5-40 SYRINGE (ML) INJECTION EVERY 12 HOURS SCHEDULED
Status: DISCONTINUED | OUTPATIENT
Start: 2025-01-14 | End: 2025-01-15 | Stop reason: HOSPADM

## 2025-01-14 RX ORDER — ENOXAPARIN SODIUM 100 MG/ML
40 INJECTION SUBCUTANEOUS DAILY
Status: DISCONTINUED | OUTPATIENT
Start: 2025-01-14 | End: 2025-01-15 | Stop reason: HOSPADM

## 2025-01-14 RX ADMIN — SODIUM CHLORIDE, PRESERVATIVE FREE 10 ML: 5 INJECTION INTRAVENOUS at 13:46

## 2025-01-14 RX ADMIN — ONDANSETRON 4 MG: 2 INJECTION INTRAMUSCULAR; INTRAVENOUS at 04:59

## 2025-01-14 RX ADMIN — MORPHINE SULFATE 4 MG: 4 INJECTION, SOLUTION INTRAMUSCULAR; INTRAVENOUS at 09:48

## 2025-01-14 RX ADMIN — PIPERACILLIN AND TAZOBACTAM 3375 MG: 3; .375 INJECTION, POWDER, LYOPHILIZED, FOR SOLUTION INTRAVENOUS at 23:02

## 2025-01-14 RX ADMIN — KETOROLAC TROMETHAMINE 15 MG: 15 INJECTION, SOLUTION INTRAMUSCULAR; INTRAVENOUS at 05:00

## 2025-01-14 RX ADMIN — SODIUM CHLORIDE: 9 INJECTION, SOLUTION INTRAVENOUS at 12:49

## 2025-01-14 RX ADMIN — ENOXAPARIN SODIUM 40 MG: 100 INJECTION SUBCUTANEOUS at 12:56

## 2025-01-14 RX ADMIN — MORPHINE SULFATE 4 MG: 4 INJECTION, SOLUTION INTRAMUSCULAR; INTRAVENOUS at 05:05

## 2025-01-14 RX ADMIN — PIPERACILLIN AND TAZOBACTAM 3375 MG: 3; .375 INJECTION, POWDER, LYOPHILIZED, FOR SOLUTION INTRAVENOUS at 17:51

## 2025-01-14 RX ADMIN — PIPERACILLIN AND TAZOBACTAM 3375 MG: 3; .375 INJECTION, POWDER, LYOPHILIZED, FOR SOLUTION INTRAVENOUS at 12:50

## 2025-01-14 ASSESSMENT — PAIN DESCRIPTION - DESCRIPTORS: DESCRIPTORS: ACHING;SORE

## 2025-01-14 ASSESSMENT — PAIN DESCRIPTION - LOCATION: LOCATION: ABDOMEN

## 2025-01-14 ASSESSMENT — PAIN SCALES - GENERAL: PAINLEVEL_OUTOF10: 2

## 2025-01-14 ASSESSMENT — PAIN DESCRIPTION - PAIN TYPE: TYPE: ACUTE PAIN

## 2025-01-14 ASSESSMENT — PAIN DESCRIPTION - ONSET: ONSET: GRADUAL

## 2025-01-14 ASSESSMENT — PAIN DESCRIPTION - ORIENTATION: ORIENTATION: LOWER;RIGHT

## 2025-01-14 NOTE — PROGRESS NOTES
29 y.o.  female who hx of gall stone presented to ER due to acute abdominal pain since last night 8 PM.  Associated with nausea or vomiting. Mrcp Cholelithiasis with moderate gallbladder distention     01/14/25  0436   ALKPHOS 223*   *   *   BILITOT 1.0   BILIDIR 0.5*   US Result Date: 1/14/2025  Gallbladder distention with cholecystolithiasis and tenderness on direct insonation but no other ultrasonographic signs of cholecystitis. Nonobstructing right nephrolithiasis.  MRI:Liver: Smooth surface contour. Normal signal characteristics. No focal lesions.  Biliary system:  Gallbladder: Cholelithiasis. Gallbladder is moderately distended. No gallbladder  wall thickening or pericholecystic fluid. No diffusion restriction of the  gallbladder wall.  Bile ducts: Moderate intrahepatic biliary ductal dilation. There is dilation of  the common bile duct measuring 1.2 cm with smooth tapering to the ampulla Vater.  No filling defects are visualized within the common bile duct. The cystic duct  has a tortuous course with apparent filling defects distally near a bend in the  duct (series 8 image 16 and 21). Upstream to the the apparent filling defects  the cystic duct is not dilated.  Probably she passed a small stone. For now observe, repeat LFT on a daily basis. Consult with surgery for cholecystectomy. She has a stone in the cystic duct that may be pushed and then cause a real CBD stone.   No indication for now to do any ERCP as no stone was found on the MRCP.  We speak about sphincter of Oddi dysfunction only after cholecystectomy.

## 2025-01-14 NOTE — PROGRESS NOTES
Talked with . He said he wants surgeon to see his wife asap. Explained to him : surgeon has an urgent case still in OR now, will pass the message to surgeon. Also explained to him the reason pt was admitted and wife agrees with it. He said he wants his wife to go home asap for new born baby.

## 2025-01-14 NOTE — ED NOTES
Medication given per MAR order. Education regarding medication provided.    Tolerated well with no complaints.     Instructed patient to utilize the call light if she needs anything further.

## 2025-01-14 NOTE — ED TRIAGE NOTES
Patient ambulatory in ED via EMS. Patient CC of abdominal pain. Patient states she has been diagnosed with sludge in her gallbladder in the past. Patient is 5 weeks post partum. Patient states the pain is in the RUQ. Patient also endorses nausea and diarrhea.

## 2025-01-14 NOTE — ED PROVIDER NOTES
Pt care assumed from Dr. Case , ED provider. Pt complaint(s), current treatment plan, progression and available diagnostic results have been discussed thoroughly. The patient was seen and evaluated on my shift.   Rounding occurred: Yes    Reassessed patient still having and mitten bouts of pain.  Labs are notable for LFT elevation, lipase normal.  MRCP did show dilated common bile duct however no obvious stone, no signs of acute cholecystitis nothing to suggest an acute emergent surgical process.  I did consult with gastroenterology, Dr. Ferrer who recommended hospitalist admission for observation and repeat LFT testing and evaluation for cholecystectomy.    MD Christopher Hyde Brandon B, MD  01/14/25 7403    
38 U/L     (H) 13 - 56 U/L   Lipase    Collection Time: 01/14/25  4:36 AM   Result Value Ref Range    Lipase 38 13 - 75 U/L       Radiologic Studies -   US GALLBLADDER RUQ   Final Result   Gallbladder distention with cholecystolithiasis and tenderness on   direct insonation but no other ultrasonographic signs of cholecystitis.   Nonobstructing right nephrolithiasis.      Electronically signed by Dominick Andrade      MRI ABDOMEN W WO CONTRAST MRCP    (Results Pending)         Procedures and Critical Care     Performed by: HAZEL GARCES, DO    Procedures     HAZEL GARCES, DO    Medical Decision Making and ED Course   - I am the first and primary provider for this patient AND AM THE PRIMARY PROVIDER OF RECORD.    - I reviewed the vital signs, available nursing notes, past medical history, past surgical history, family history and social history.    - Initial assessment performed. The patients presenting problems have been discussed, and the staff are in agreement with the care plan formulated and outlined with them.  I have encouraged them to ask questions as they arise throughout their visit.    Vital Signs-Reviewed the patient's vital signs.    Patient Vitals for the past 12 hrs:   Temp Pulse Resp BP SpO2   01/14/25 0530 -- -- -- 116/79 98 %   01/14/25 0500 -- -- -- (!) 108/91 98 %   01/14/25 0424 97.2 °F (36.2 °C) 68 20 120/84 99 %         Provider Notes (Medical Decision Making):   ED Course as of 01/14/25 0720   Tue Jan 14, 2025   0456 Bedside ultrasound shows appearance of cholelithiasis versus sludge within the gallbladder with distended appearing gallbladder, no gallbladder wall thickening or pericholecystic fluid noted, area was quite tender to palpation [JM]   5174 Patient feels significant improvement in her pain level and nausea on reassessment.  Noticed her total bilirubin level is within normal limits but LFTs are elevated [JM]   5127 Spoke with Dr. Ferrer. Recommended MRCP to confirm presence

## 2025-01-14 NOTE — ED NOTES
TRANSFER - OUT REPORT:    Verbal report given to Lolis RN on Camryn Dukes  being transferred to [facility] for routine progression of patient care       Report consisted of patient's Situation, Background, Assessment and   Recommendations(SBAR).     Information from the following report(s) Nurse Handoff Report, ED Encounter Summary, ED SBAR, Adult Overview, MAR, Recent Results, Quality Measures, and Neuro Assessment was reviewed with the receiving nurse.          Lines:   Peripheral IV 01/14/25 Left Antecubital (Active)   Site Assessment Clean, dry & intact 01/14/25 0437        Opportunity for questions and clarification was provided.      Patient transported with:  Registered Nurse

## 2025-01-14 NOTE — H&P
History & Physical    Patient: Cmaryn Dukes MRN: [id number]  CSN: [id number]    YOB: 1995  Age: 29 y.o.  Sex: female      DOA: 2025    Chief Complaint:   Chief Complaint   Patient presents with    Abdominal Pain       Active Hospital Problems    Diagnosis Date Noted    Common biliary duct obstruction [K83.1] 2025    Elevated LFTs [R79.89] 2025          HPI:     Camryn Dukes is a 29 y.o.  female who hx of gall stone presented to ER due to acute abdominal pain since last night 8 PM.  Associated with nausea or vomiting. Mrcp Cholelithiasis with moderate gallbladder distention. GI and surgeon called. She is breast-feeding now. She received morphine for pain in er       Past medical history : gall stone       Past Surgical History:   Procedure Laterality Date     SECTION       SECTION       Fhx  none   Social History     Socioeconomic History    Marital status:    Tobacco Use    Smoking status: Never   Substance and Sexual Activity    Alcohol use: Not Currently    Drug use: Never       Prior to Admission medications    Medication Sig Start Date End Date Taking? Authorizing Provider   ferrous sulfate (IRON 325) 325 (65 Fe) MG tablet Take 325 mg by mouth 2 times daily (with meals) 21   Automatic Reconciliation, Ar   ibuprofen (ADVIL;MOTRIN) 800 MG tablet Take 800 mg by mouth 3 times daily 21   Automatic Reconciliation, Ar       No Known Allergies      Review of Systems  GENERAL: no weakness or fatigue  HEENT: No change in vision, no earache, tinnitus, sore throat or sinus congestion.   NECK: No pain or stiffness.   PULMONARY: No shortness of breath, cough or wheeze.   Cardiovascular: no pnd or orthopnea, no CP  GASTROINTESTINAL: +  abdominal pain,  + nausea,  + vomiting, no  diarrhea, melena or bright red blood per rectum.   GENITOURINARY: No urinary frequency, urgency, hesitancy or dysuria.   MUSCULOSKELETAL: No joint or muscle pain, no back pain, no

## 2025-01-14 NOTE — FLOWSHEET NOTE
01/14/25 0530   Vital Signs   /79   MAP (Calculated) 91   MAP (mmHg) 87     Medication given per MAR order. Education regarding medication provided.    Tolerated well with no complaints.     Instructed patient to utilize the call light if she needs anything further.

## 2025-01-15 VITALS
RESPIRATION RATE: 15 BRPM | HEIGHT: 64 IN | HEART RATE: 54 BPM | SYSTOLIC BLOOD PRESSURE: 110 MMHG | WEIGHT: 150 LBS | DIASTOLIC BLOOD PRESSURE: 64 MMHG | BODY MASS INDEX: 25.61 KG/M2 | TEMPERATURE: 98.1 F | OXYGEN SATURATION: 97 %

## 2025-01-15 PROBLEM — K80.20 GALLSTONES: Status: ACTIVE | Noted: 2025-01-15

## 2025-01-15 LAB
ALBUMIN SERPL-MCNC: 3.2 G/DL (ref 3.4–5)
ALBUMIN/GLOB SERPL: 1.1 (ref 0.8–1.7)
ALP SERPL-CCNC: 156 U/L (ref 45–117)
ALT SERPL-CCNC: 111 U/L (ref 13–56)
ANION GAP SERPL CALC-SCNC: 4 MMOL/L (ref 3–18)
AST SERPL-CCNC: 49 U/L (ref 10–38)
BASOPHILS # BLD: 0.05 K/UL (ref 0–0.1)
BASOPHILS NFR BLD: 1 % (ref 0–2)
BILIRUB SERPL-MCNC: 0.6 MG/DL (ref 0.2–1)
BUN SERPL-MCNC: 11 MG/DL (ref 7–18)
BUN/CREAT SERPL: 13 (ref 12–20)
CALCIUM SERPL-MCNC: 8.4 MG/DL (ref 8.5–10.1)
CHLORIDE SERPL-SCNC: 109 MMOL/L (ref 100–111)
CO2 SERPL-SCNC: 27 MMOL/L (ref 21–32)
CREAT SERPL-MCNC: 0.88 MG/DL (ref 0.6–1.3)
DIFFERENTIAL METHOD BLD: ABNORMAL
EOSINOPHIL # BLD: 0.17 K/UL (ref 0–0.4)
EOSINOPHIL NFR BLD: 3.4 % (ref 0–5)
ERYTHROCYTE [DISTWIDTH] IN BLOOD BY AUTOMATED COUNT: 14.5 % (ref 11.6–14.5)
GLOBULIN SER CALC-MCNC: 2.9 G/DL (ref 2–4)
GLUCOSE SERPL-MCNC: 81 MG/DL (ref 74–99)
HCT VFR BLD AUTO: 35.8 % (ref 35–45)
HGB BLD-MCNC: 11.2 G/DL (ref 12–16)
IMM GRANULOCYTES # BLD AUTO: 0.04 K/UL (ref 0–0.04)
IMM GRANULOCYTES NFR BLD AUTO: 0.8 % (ref 0–0.5)
LYMPHOCYTES # BLD: 2.17 K/UL (ref 0.9–3.3)
LYMPHOCYTES NFR BLD: 43.8 % (ref 21–52)
MAGNESIUM SERPL-MCNC: 2.1 MG/DL (ref 1.6–2.6)
MCH RBC QN AUTO: 28.3 PG (ref 24–34)
MCHC RBC AUTO-ENTMCNC: 31.3 G/DL (ref 31–37)
MCV RBC AUTO: 90.4 FL (ref 78–100)
MONOCYTES # BLD: 0.47 K/UL (ref 0.05–1.2)
MONOCYTES NFR BLD: 9.5 % (ref 3–10)
NEUTS SEG # BLD: 2.05 K/UL (ref 1.8–8)
NEUTS SEG NFR BLD: 41.5 % (ref 40–73)
NRBC # BLD: 0 K/UL (ref 0–0.01)
NRBC BLD-RTO: 0 PER 100 WBC
PLATELET # BLD AUTO: 264 K/UL (ref 135–420)
PMV BLD AUTO: 9.6 FL (ref 9.2–11.8)
POTASSIUM SERPL-SCNC: 4.2 MMOL/L (ref 3.5–5.5)
PROT SERPL-MCNC: 6.1 G/DL (ref 6.4–8.2)
RBC # BLD AUTO: 3.96 M/UL (ref 4.2–5.3)
SODIUM SERPL-SCNC: 140 MMOL/L (ref 136–145)
WBC # BLD AUTO: 5 K/UL (ref 4.6–13.2)

## 2025-01-15 PROCEDURE — 6360000002 HC RX W HCPCS: Performed by: HOSPITALIST

## 2025-01-15 PROCEDURE — 80053 COMPREHEN METABOLIC PANEL: CPT

## 2025-01-15 PROCEDURE — 2580000003 HC RX 258: Performed by: HOSPITALIST

## 2025-01-15 PROCEDURE — 85025 COMPLETE CBC W/AUTO DIFF WBC: CPT

## 2025-01-15 PROCEDURE — 96372 THER/PROPH/DIAG INJ SC/IM: CPT

## 2025-01-15 PROCEDURE — 2500000003 HC RX 250 WO HCPCS: Performed by: HOSPITALIST

## 2025-01-15 PROCEDURE — G0378 HOSPITAL OBSERVATION PER HR: HCPCS

## 2025-01-15 PROCEDURE — 6370000000 HC RX 637 (ALT 250 FOR IP): Performed by: HOSPITALIST

## 2025-01-15 PROCEDURE — 83735 ASSAY OF MAGNESIUM: CPT

## 2025-01-15 PROCEDURE — 96366 THER/PROPH/DIAG IV INF ADDON: CPT

## 2025-01-15 PROCEDURE — 36415 COLL VENOUS BLD VENIPUNCTURE: CPT

## 2025-01-15 RX ORDER — IBUPROFEN 400 MG/1
400 TABLET, FILM COATED ORAL EVERY 6 HOURS PRN
Status: DISCONTINUED | OUTPATIENT
Start: 2025-01-15 | End: 2025-01-15 | Stop reason: HOSPADM

## 2025-01-15 RX ADMIN — ONDANSETRON 4 MG: 4 TABLET, ORALLY DISINTEGRATING ORAL at 12:01

## 2025-01-15 RX ADMIN — PIPERACILLIN AND TAZOBACTAM 3375 MG: 3; .375 INJECTION, POWDER, LYOPHILIZED, FOR SOLUTION INTRAVENOUS at 11:52

## 2025-01-15 RX ADMIN — PIPERACILLIN AND TAZOBACTAM 3375 MG: 3; .375 INJECTION, POWDER, LYOPHILIZED, FOR SOLUTION INTRAVENOUS at 05:16

## 2025-01-15 RX ADMIN — SODIUM CHLORIDE: 9 INJECTION, SOLUTION INTRAVENOUS at 03:12

## 2025-01-15 RX ADMIN — SODIUM CHLORIDE, PRESERVATIVE FREE 10 ML: 5 INJECTION INTRAVENOUS at 09:27

## 2025-01-15 RX ADMIN — IBUPROFEN 400 MG: 400 TABLET, FILM COATED ORAL at 14:23

## 2025-01-15 RX ADMIN — ENOXAPARIN SODIUM 40 MG: 100 INJECTION SUBCUTANEOUS at 09:27

## 2025-01-15 ASSESSMENT — PAIN SCALES - GENERAL
PAINLEVEL_OUTOF10: 3
PAINLEVEL_OUTOF10: 0

## 2025-01-15 ASSESSMENT — PAIN - FUNCTIONAL ASSESSMENT: PAIN_FUNCTIONAL_ASSESSMENT: ACTIVITIES ARE NOT PREVENTED

## 2025-01-15 ASSESSMENT — PAIN DESCRIPTION - ORIENTATION: ORIENTATION: ANTERIOR

## 2025-01-15 ASSESSMENT — PAIN DESCRIPTION - LOCATION: LOCATION: HEAD

## 2025-01-15 ASSESSMENT — PAIN DESCRIPTION - DESCRIPTORS: DESCRIPTORS: ACHING

## 2025-01-15 NOTE — DISCHARGE SUMMARY
Discharge Summary    Patient: Camryn Dukes               Sex: female          DOA: 1/14/2025         YOB: 1995      Age:  29 y.o.        LOS:  LOS: 0 days                Admit Date: 1/14/2025    Discharge Date: 1/15/2025    Admission Diagnoses: Calculus of gallbladder with acute cholecystitis and obstruction [K80.01]  Common bile duct obstruction [K83.1]  Common biliary duct obstruction [K83.1]    Discharge Diagnoses:    Hospital Problems             Last Modified POA    * (Principal) Common biliary duct obstruction 1/14/2025 Yes    Elevated LFTs 1/14/2025 Yes        Discharge Condition: Stable    Hospital Course ;   Camryn Dukes is a 29 y.o.  female who hx of gall stone presented to ER due to acute abdominal pain since last night 8 PM.  Associated with nausea or vomiting. Mrcp Cholelithiasis with moderate gallbladder distention. GI and surgeon called. She is breast-feeding now. She received morphine for pain in er .   She was admitted to hospital, GI and surgeon consulted. Dr. Barrios no ercp planning and recommend cholecystectomy. Dr. Harrell planning to cholecystectomy next Wednesday.  Patient agreed with the plan.  Patient has no nausea no vomiting tolerated diet very well since admission.  No pain.     Physical Exam:  /64   Pulse 54   Temp 98.1 °F (36.7 °C) (Oral)   Resp 15   Ht 1.626 m (5' 4\")   Wt 68 kg (150 lb)   SpO2 97%   BMI 25.75 kg/m²   General appearance: alert, cooperative, no distress, appears stated age  Head: Normocephalic, without obvious abnormality, atraumatic  Neck: supple, trachea midline  Lungs: clear to auscultation bilaterally  Heart: regular rate and rhythm, S1, S2 normal, no murmur, click, rub or gallop  Abdomen: soft, non-tender. Bowel sounds normal. No masses,  no organomegaly  Extremities: extremities normal, atraumatic, no cyanosis or edema  Skin: Skin color, texture, turgor normal. No rashes or lesions  Neurologic: Grossly normal    Labs: Results:

## 2025-01-15 NOTE — PROGRESS NOTES
1930- Bedside and Verbal shift change report given to NASRA Mcqueen (oncoming nurse) by NASRA Hernandes (offgoing nurse). Report included the following information Nurse Handoff Report, Adult Overview, Intake/Output, MAR, and Recent Results.    2045- Shift assessment don as per flow sheet.      0720- Bedside and Verbal shift change report given to NASRA Spaulding (oncoming nurse) by NASRA Mcqueen (offgoing nurse). Report included the following information Nurse Handoff Report, Adult Overview, Intake/Output, MAR, and Recent Results.

## 2025-01-15 NOTE — PLAN OF CARE
Problem: Safety - Adult  Goal: Free from fall injury  1/15/2025 0647 by Charisse Gloria RN  Outcome: Progressing     Problem: ABCDS Injury Assessment  Goal: Absence of physical injury  1/15/2025 0647 by Charisse Gloria RN  Outcome: Progressing     Problem: Pain  Goal: Verbalizes/displays adequate comfort level or baseline comfort level  1/15/2025 0647 by Charisse Gloria RN  Outcome: Progressing

## 2025-01-15 NOTE — PLAN OF CARE
Problem: Safety - Adult  Goal: Free from fall injury  1/15/2025 0749 by Jasson Estrella RN  Outcome: Progressing  1/15/2025 0647 by Charisse Gloria RN  Outcome: Progressing  1/14/2025 1820 by Greta Gutierrez RN  Outcome: Progressing     Problem: ABCDS Injury Assessment  Goal: Absence of physical injury  1/15/2025 0749 by Jasson Estrella RN  Outcome: Progressing  1/15/2025 0647 by Charisse Gloria RN  Outcome: Progressing  1/14/2025 1820 by Greta Gutierrez RN  Outcome: Progressing     Problem: Pain  Goal: Verbalizes/displays adequate comfort level or baseline comfort level  1/15/2025 0749 by Jasson Estrella RN  Outcome: Progressing  1/15/2025 0647 by Charisse Gloria RN  Outcome: Progressing  1/14/2025 1820 by Greta Gutierrez RN  Outcome: Progressing

## 2025-01-15 NOTE — CARE COORDINATION
01/15/25 0850   Service Assessment   Patient Orientation Alert and Oriented   Cognition Alert   History Provided By Patient   Primary Caregiver Self   Accompanied By/Relationship N/A   Support Systems Spouse/Significant Other   PCP Verified by CM Yes   Last Visit to PCP Within last 6 months   Prior Functional Level Independent in ADLs/IADLs   Current Functional Level Independent in ADLs/IADLs   Can patient return to prior living arrangement Yes   Ability to make needs known: Good   Family able to assist with home care needs: Yes   Would you like for me to discuss the discharge plan with any other family members/significant others, and if so, who? Yes   Financial Resources Other (Comment)  (Olinda)   Social/Functional History   Lives With Spouse   Type of Home House   Home Equipment None   Prior Level of Assist for ADLs Independent   Prior Level of Assist for Homemaking Independent   Ambulation Assistance Independent   Prior Level of Assist for Transfers Independent   Active  Yes   Mode of Transportation Car   Occupation Full time employment   Discharge Planning   Type of Residence House   Living Arrangements Spouse/Significant Other   Current Services Prior To Admission None   Potential Assistance Needed N/A   DME Ordered? No   Potential Assistance Purchasing Medications No   Type of Home Care Services None   Patient expects to be discharged to: House   History of falls? 0   Services At/After Discharge   Transition of Care Consult (CM Consult) Discharge Planning   Services At/After Discharge None   Mode of Transport at Discharge Other (see comment)  (Spouse)   Confirm Follow Up Transport Self   Condition of Participation: Discharge Planning   The Plan for Transition of Care is related to the following treatment goals: The patient states at the time of DC her plan is to DC to home   The Patient and/or Patient Representative was provided with a Choice of Provider? Patient   The Patient and/Or Patient

## 2025-01-15 NOTE — PROGRESS NOTES
Case discussed with Dr. Harrell.  Will scheduled cholecystectomy next Wednesday.  Cleared patient to go home.  patient has no abdominal pain no nausea no vomiting tolerated diet very well.

## 2025-01-15 NOTE — CONSULTS
Consult Note    Patient: Camryn Dukes MRN: [id number]  CSN: [id number]    YOB: 1995  Age: 29 y.o.  Sex: female    DOA: 2025 LOS:  LOS: 0 days      Hospitalist  Requesting Physician: Hospitalist  Reason for Consultation: Cholecystitis               HPI:     Camryn Dukes is a 29 y.o. female who has been seen for possible cholecystitis and cholelithiasis.  The patient had gallbladder problems when she was pregnant and has known gallstones and gallbladder problems.  She appears to have presented with another gallbladder attack and may have passed a gallstone however is stable and feeling much better.  MRCP shows no common bile duct stones.  There may be stenosis of her ampulla of Vater however GI Dr. Keisha Forrester does not think any attention needs to be done now.    No past medical history on file.    Past Surgical History:   Procedure Laterality Date     SECTION       SECTION         No family history on file.    Social History     Socioeconomic History    Marital status:    Tobacco Use    Smoking status: Never   Substance and Sexual Activity    Alcohol use: Not Currently    Drug use: Never     Social Determinants of Health     Food Insecurity: No Food Insecurity (2025)    Hunger Vital Sign     Worried About Running Out of Food in the Last Year: Never true     Ran Out of Food in the Last Year: Never true   Transportation Needs: No Transportation Needs (2025)    PRAPARE - Transportation     Lack of Transportation (Medical): No     Lack of Transportation (Non-Medical): No   Housing Stability: Low Risk  (2025)    Housing Stability Vital Sign     Unable to Pay for Housing in the Last Year: No     Number of Times Moved in the Last Year: 1     Homeless in the Last Year: No       Prior to Admission medications    Medication Sig Start Date End Date Taking? Authorizing Provider   ibuprofen (ADVIL;MOTRIN) 800 MG tablet Take 1 tablet by mouth 3 times daily 21  Yes

## 2025-01-21 ENCOUNTER — ANESTHESIA EVENT (OUTPATIENT)
Facility: HOSPITAL | Age: 30
End: 2025-01-21
Payer: COMMERCIAL

## 2025-01-22 ENCOUNTER — ANESTHESIA (OUTPATIENT)
Facility: HOSPITAL | Age: 30
End: 2025-01-22
Payer: COMMERCIAL

## 2025-01-22 ENCOUNTER — HOSPITAL ENCOUNTER (OUTPATIENT)
Facility: HOSPITAL | Age: 30
Setting detail: OUTPATIENT SURGERY
Discharge: HOME | End: 2025-01-22
Attending: SURGERY | Admitting: SURGERY
Payer: COMMERCIAL

## 2025-01-22 VITALS
DIASTOLIC BLOOD PRESSURE: 67 MMHG | HEIGHT: 64 IN | RESPIRATION RATE: 16 BRPM | OXYGEN SATURATION: 96 % | TEMPERATURE: 96.9 F | BODY MASS INDEX: 25.44 KG/M2 | HEART RATE: 68 BPM | WEIGHT: 149 LBS | SYSTOLIC BLOOD PRESSURE: 109 MMHG

## 2025-01-22 DIAGNOSIS — K80.20 GALLSTONES: Primary | ICD-10-CM

## 2025-01-22 LAB — HCG UR QL: NEGATIVE

## 2025-01-22 PROCEDURE — 7100000000 HC PACU RECOVERY - FIRST 15 MIN: Performed by: SURGERY

## 2025-01-22 PROCEDURE — 2580000003 HC RX 258: Performed by: SURGERY

## 2025-01-22 PROCEDURE — 6370000000 HC RX 637 (ALT 250 FOR IP): Performed by: ANESTHESIOLOGY

## 2025-01-22 PROCEDURE — 7100000011 HC PHASE II RECOVERY - ADDTL 15 MIN: Performed by: SURGERY

## 2025-01-22 PROCEDURE — 6360000002 HC RX W HCPCS: Performed by: NURSE ANESTHETIST, CERTIFIED REGISTERED

## 2025-01-22 PROCEDURE — 2500000003 HC RX 250 WO HCPCS: Performed by: NURSE ANESTHETIST, CERTIFIED REGISTERED

## 2025-01-22 PROCEDURE — 2709999900 HC NON-CHARGEABLE SUPPLY: Performed by: SURGERY

## 2025-01-22 PROCEDURE — 3700000001 HC ADD 15 MINUTES (ANESTHESIA): Performed by: SURGERY

## 2025-01-22 PROCEDURE — 6360000002 HC RX W HCPCS: Performed by: SURGERY

## 2025-01-22 PROCEDURE — 2500000003 HC RX 250 WO HCPCS: Performed by: SURGERY

## 2025-01-22 PROCEDURE — 3700000000 HC ANESTHESIA ATTENDED CARE: Performed by: SURGERY

## 2025-01-22 PROCEDURE — 6360000002 HC RX W HCPCS: Performed by: ANESTHESIOLOGY

## 2025-01-22 PROCEDURE — 3600000002 HC SURGERY LEVEL 2 BASE: Performed by: SURGERY

## 2025-01-22 PROCEDURE — 81025 URINE PREGNANCY TEST: CPT

## 2025-01-22 PROCEDURE — 2580000003 HC RX 258: Performed by: NURSE ANESTHETIST, CERTIFIED REGISTERED

## 2025-01-22 PROCEDURE — 3600000012 HC SURGERY LEVEL 2 ADDTL 15MIN: Performed by: SURGERY

## 2025-01-22 PROCEDURE — 7100000001 HC PACU RECOVERY - ADDTL 15 MIN: Performed by: SURGERY

## 2025-01-22 PROCEDURE — 88304 TISSUE EXAM BY PATHOLOGIST: CPT

## 2025-01-22 PROCEDURE — 7100000010 HC PHASE II RECOVERY - FIRST 15 MIN: Performed by: SURGERY

## 2025-01-22 RX ORDER — SODIUM CHLORIDE, SODIUM LACTATE, POTASSIUM CHLORIDE, CALCIUM CHLORIDE 600; 310; 30; 20 MG/100ML; MG/100ML; MG/100ML; MG/100ML
INJECTION, SOLUTION INTRAVENOUS
Status: DISCONTINUED | OUTPATIENT
Start: 2025-01-22 | End: 2025-01-22 | Stop reason: SDUPTHER

## 2025-01-22 RX ORDER — ONDANSETRON 2 MG/ML
INJECTION INTRAMUSCULAR; INTRAVENOUS
Status: DISCONTINUED | OUTPATIENT
Start: 2025-01-22 | End: 2025-01-22 | Stop reason: SDUPTHER

## 2025-01-22 RX ORDER — OXYCODONE HYDROCHLORIDE 5 MG/1
5 TABLET ORAL EVERY 6 HOURS PRN
Qty: 10 TABLET | Refills: 0 | Status: SHIPPED | OUTPATIENT
Start: 2025-01-22 | End: 2025-01-25

## 2025-01-22 RX ORDER — FENTANYL CITRATE 50 UG/ML
50 INJECTION, SOLUTION INTRAMUSCULAR; INTRAVENOUS EVERY 5 MIN PRN
Status: DISCONTINUED | OUTPATIENT
Start: 2025-01-22 | End: 2025-01-22 | Stop reason: HOSPADM

## 2025-01-22 RX ORDER — MIDAZOLAM HYDROCHLORIDE 1 MG/ML
INJECTION, SOLUTION INTRAMUSCULAR; INTRAVENOUS
Status: DISCONTINUED | OUTPATIENT
Start: 2025-01-22 | End: 2025-01-22 | Stop reason: SDUPTHER

## 2025-01-22 RX ORDER — ONDANSETRON 2 MG/ML
4 INJECTION INTRAMUSCULAR; INTRAVENOUS
Status: DISCONTINUED | OUTPATIENT
Start: 2025-01-22 | End: 2025-01-22 | Stop reason: HOSPADM

## 2025-01-22 RX ORDER — NALOXONE HYDROCHLORIDE 0.4 MG/ML
INJECTION, SOLUTION INTRAMUSCULAR; INTRAVENOUS; SUBCUTANEOUS PRN
Status: DISCONTINUED | OUTPATIENT
Start: 2025-01-22 | End: 2025-01-22 | Stop reason: HOSPADM

## 2025-01-22 RX ORDER — FENTANYL CITRATE 50 UG/ML
INJECTION, SOLUTION INTRAMUSCULAR; INTRAVENOUS
Status: DISCONTINUED | OUTPATIENT
Start: 2025-01-22 | End: 2025-01-22 | Stop reason: SDUPTHER

## 2025-01-22 RX ORDER — PHENYLEPHRINE HYDROCHLORIDE 10 MG/ML
INJECTION INTRAVENOUS
Status: DISCONTINUED | OUTPATIENT
Start: 2025-01-22 | End: 2025-01-22 | Stop reason: SDUPTHER

## 2025-01-22 RX ORDER — PROPOFOL 10 MG/ML
INJECTION, EMULSION INTRAVENOUS
Status: DISCONTINUED | OUTPATIENT
Start: 2025-01-22 | End: 2025-01-22 | Stop reason: SDUPTHER

## 2025-01-22 RX ORDER — SODIUM CHLORIDE 9 MG/ML
INJECTION, SOLUTION INTRAVENOUS CONTINUOUS
Status: DISCONTINUED | OUTPATIENT
Start: 2025-01-22 | End: 2025-01-22 | Stop reason: HOSPADM

## 2025-01-22 RX ORDER — OXYCODONE HYDROCHLORIDE 5 MG/1
5 TABLET ORAL PRN
Status: COMPLETED | OUTPATIENT
Start: 2025-01-22 | End: 2025-01-22

## 2025-01-22 RX ORDER — BUPIVACAINE HYDROCHLORIDE 2.5 MG/ML
INJECTION, SOLUTION EPIDURAL; INFILTRATION; INTRACAUDAL PRN
Status: DISCONTINUED | OUTPATIENT
Start: 2025-01-22 | End: 2025-01-22 | Stop reason: ALTCHOICE

## 2025-01-22 RX ORDER — ROCURONIUM BROMIDE 10 MG/ML
INJECTION, SOLUTION INTRAVENOUS
Status: DISCONTINUED | OUTPATIENT
Start: 2025-01-22 | End: 2025-01-22 | Stop reason: SDUPTHER

## 2025-01-22 RX ORDER — HYDROMORPHONE HYDROCHLORIDE 1 MG/ML
0.5 INJECTION, SOLUTION INTRAMUSCULAR; INTRAVENOUS; SUBCUTANEOUS EVERY 5 MIN PRN
Status: COMPLETED | OUTPATIENT
Start: 2025-01-22 | End: 2025-01-22

## 2025-01-22 RX ORDER — OXYCODONE HYDROCHLORIDE 5 MG/1
10 TABLET ORAL PRN
Status: COMPLETED | OUTPATIENT
Start: 2025-01-22 | End: 2025-01-22

## 2025-01-22 RX ADMIN — PHENYLEPHRINE HYDROCHLORIDE 100 MCG: 10 INJECTION INTRAVENOUS at 13:41

## 2025-01-22 RX ADMIN — ROCURONIUM BROMIDE 50 MG: 10 INJECTION, SOLUTION INTRAVENOUS at 13:21

## 2025-01-22 RX ADMIN — CEFOXITIN SODIUM 2000 MG: 2 POWDER, FOR SOLUTION INTRAVENOUS at 13:28

## 2025-01-22 RX ADMIN — ONDANSETRON HYDROCHLORIDE 4 MG: 2 INJECTION INTRAMUSCULAR; INTRAVENOUS at 13:45

## 2025-01-22 RX ADMIN — HYDROMORPHONE HYDROCHLORIDE 0.5 MG: 1 INJECTION, SOLUTION INTRAMUSCULAR; INTRAVENOUS; SUBCUTANEOUS at 14:23

## 2025-01-22 RX ADMIN — SODIUM CHLORIDE: 9 INJECTION, SOLUTION INTRAVENOUS at 10:56

## 2025-01-22 RX ADMIN — SODIUM CHLORIDE, SODIUM LACTATE, POTASSIUM CHLORIDE, AND CALCIUM CHLORIDE: 600; 310; 30; 20 INJECTION, SOLUTION INTRAVENOUS at 13:16

## 2025-01-22 RX ADMIN — HYDROMORPHONE HYDROCHLORIDE 0.5 MG: 1 INJECTION, SOLUTION INTRAMUSCULAR; INTRAVENOUS; SUBCUTANEOUS at 14:14

## 2025-01-22 RX ADMIN — MIDAZOLAM 2 MG: 1 INJECTION INTRAMUSCULAR; INTRAVENOUS at 13:16

## 2025-01-22 RX ADMIN — PROPOFOL 200 MG: 10 INJECTION, EMULSION INTRAVENOUS at 13:21

## 2025-01-22 RX ADMIN — FENTANYL CITRATE 100 MCG: 50 INJECTION, SOLUTION INTRAMUSCULAR; INTRAVENOUS at 13:21

## 2025-01-22 RX ADMIN — HYDROMORPHONE HYDROCHLORIDE 1 MG: 1 INJECTION, SOLUTION INTRAMUSCULAR; INTRAVENOUS; SUBCUTANEOUS at 13:34

## 2025-01-22 RX ADMIN — OXYCODONE 5 MG: 5 TABLET ORAL at 15:30

## 2025-01-22 ASSESSMENT — PAIN DESCRIPTION - DESCRIPTORS
DESCRIPTORS: SORE
DESCRIPTORS: ACHING
DESCRIPTORS: ACHING
DESCRIPTORS: DISCOMFORT;ACHING;DULL
DESCRIPTORS: ACHING;DISCOMFORT;DULL
DESCRIPTORS: ACHING
DESCRIPTORS: DULL;ACHING;DISCOMFORT
DESCRIPTORS: ACHING

## 2025-01-22 ASSESSMENT — PAIN DESCRIPTION - LOCATION
LOCATION: ABDOMEN

## 2025-01-22 ASSESSMENT — PAIN SCALES - GENERAL
PAINLEVEL_OUTOF10: 7
PAINLEVEL_OUTOF10: 4
PAINLEVEL_OUTOF10: 4
PAINLEVEL_OUTOF10: 0
PAINLEVEL_OUTOF10: 4
PAINLEVEL_OUTOF10: 2
PAINLEVEL_OUTOF10: 4
PAINLEVEL_OUTOF10: 4
PAINLEVEL_OUTOF10: 7

## 2025-01-22 ASSESSMENT — PAIN DESCRIPTION - ORIENTATION
ORIENTATION: MID
ORIENTATION: ANTERIOR

## 2025-01-22 ASSESSMENT — PAIN - FUNCTIONAL ASSESSMENT
PAIN_FUNCTIONAL_ASSESSMENT: ACTIVITIES ARE NOT PREVENTED
PAIN_FUNCTIONAL_ASSESSMENT: 0-10
PAIN_FUNCTIONAL_ASSESSMENT: ACTIVITIES ARE NOT PREVENTED
PAIN_FUNCTIONAL_ASSESSMENT: ACTIVITIES ARE NOT PREVENTED
PAIN_FUNCTIONAL_ASSESSMENT: 0-10

## 2025-01-22 ASSESSMENT — PAIN DESCRIPTION - PAIN TYPE
TYPE: SURGICAL PAIN

## 2025-01-22 ASSESSMENT — PAIN DESCRIPTION - ONSET
ONSET: ON-GOING
ONSET: AWAKENED FROM SLEEP

## 2025-01-22 ASSESSMENT — PAIN DESCRIPTION - FREQUENCY
FREQUENCY: INTERMITTENT
FREQUENCY: CONTINUOUS
FREQUENCY: INTERMITTENT
FREQUENCY: CONTINUOUS

## 2025-01-22 NOTE — PERIOP NOTE
Patient assisted to the restroom and back to stretcher without incident.  Call bell within reach and no further needs at this time.     Detail Level: Detailed Quality 137: Melanoma: Continuity Of Care - Recall System: Patient information entered into a recall system that includes: target date for the next exam specified AND a process to follow up with patients regarding missed or unscheduled appointments Detail Level: Zone Detail Level: Simple

## 2025-01-22 NOTE — ANESTHESIA PRE PROCEDURE
Department of Anesthesiology  Preprocedure Note       Name:  Camryn Dukes   Age:  29 y.o.  :  1995                                          MRN:  [id number]         Date:  2025      Surgeon: Surgeon(s):  Abhinav Ludwig MD    Procedure: Procedure(s):  LAPAROSCOPIC CHOLECYSTECTOMY WITH POSSIBLE LIVER WEDGE BIOPSY    Medications prior to admission:   Prior to Admission medications    Not on File       Current medications:    Current Facility-Administered Medications   Medication Dose Route Frequency Provider Last Rate Last Admin   • cefOXitin (MEFOXIN) 2,000 mg in sterile water 20 mL IV syringe  2,000 mg IntraVENous On Call to OR Abhinav Ludwig MD       • 0.9 % sodium chloride infusion   IntraVENous Continuous Abhinav Ludwig  mL/hr at 25 1208 NoRateChange at 25 1208       Allergies:  No Known Allergies    Problem List:    Patient Active Problem List   Diagnosis Code   • Wound infection T14.8XXA, L08.9   • Pulmonary edema J81.1   • Endometritis following delivery O86.12   • Leukocytosis D72.829   • Sepsis (HCC) A41.9   • Anemia D64.9   • Anemia due to acute blood loss D62   • At risk for breastfeeding difficulty Z91.89   • Hypokalemia E87.6   • Shock R57.9   • Postpartum hemorrhage O72.1   • UTI (urinary tract infection) N39.0   • Postoperative complication T81.9XXA   • Bleeding postpartum O72.1   • Fever R50.9   • Pleural effusion, bilateral J90   • Severe anemia D64.9   • Common biliary duct obstruction K83.1   • Elevated LFTs R79.89   • Gallstones K80.20       Past Medical History:        Diagnosis Date   • Abnormal liver enzymes    • Exercise tolerance finding     states able to climb flight of stairs without sob/cp   • Wears contact lenses     do not wear on day of surgery       Past Surgical History:        Procedure Laterality Date   •  SECTION         • WISDOM TOOTH EXTRACTION         Social History:    Social History     Tobacco Use   • Smoking status: Never

## 2025-01-22 NOTE — OP NOTE
OPERATIVE NOTE    Patient: Camryn Dukes MRN: [id number]  CSN: [id number]    YOB: 1995  Age: 29 y.o.  Sex: female      Indications: This is a 29 y.o. year-old female who presents with Gallstones     Date of Procedure: 1/22/2025    Preoperative Diagnosis:Gallstones     Postoperative Diagnosis: Gallstones     Procedure: Procedure(s):  LAPAROSCOPIC CHOLECYSTECTOMY    Surgeon(s): Surgeons and Role:     * Abhinav Ludwig MD - Primary    Anesthesia: General     Assistant:  Surgical Assistant: Marcela Stewart Scrub: Joy Terry  Scrub Person First: Josefina Prajapati    Implants:  * No implants in log *    Procedure: The patient was placed in the supine position. Sedation was achieved by anesthesia.  Patient's abdomen was prepped and draped in the usual fashion.      Veress needle was inserted at Palmers point in the LUQ using 1-drop technique. A 5-mm Optiview trocar was placed under visualization. A 11-mm port was placed in the upper abdomen, and two 5-mm ports placed on the right side of the abdomen. The gallbladder was visualized.  The remainder of the abdominal examination was unremarkable. The gallbladder was retracted laterally and superiorly, exposing the infundibulum. The infundibulum was dissected down to the cystic duct and cystic artery carefully.  Both of these structures were identified and confirmed using critical view once the critical view was confirmed and visualized, they were ligated and divided using metal clips and laparoscopic scissors. The gallbladder was removed from the gallbladder fossa using the J-hook electrocautery. The gallbladder was removed through the top trocar port without difficulty using a bag. Adequate hemostasis was seen. All ports were removed.  The 11 mm port fascial incision was closed with a 0 PDS suture.  Skin was closed with 4-0 Monocryl in subcuticular closure and Dermabond.      The patient was extubated and transferred to the recovery room in stable

## 2025-01-22 NOTE — PERIOP NOTE
TRANSFER - IN REPORT:    Verbal report received from Sujatha rn on Camryn Dukes  being received from [facility] for routine post-op      Report consisted of patient's Situation, Background, Assessment and   Recommendations(SBAR).     Information from the following report(s) Nurse Handoff Report was reviewed with the receiving nurse.    Opportunity for questions and clarification was provided.      Assessment completed upon patient's arrival to unit and care assumed.

## 2025-01-22 NOTE — H&P
History & Physical    Patient: Camryn Dukes MRN: [id number]  CSN: [id number]    YOB: 1995  Age: 29 y.o.  Sex: female      DOA: 2025       HPI:     Camryn Dukes is a 29 y.o. female who presents for laparoscopic cholecystectomy.  Patient has a history of gallstones probable passed gallstone.  She was admitted to the hospital last week with symptoms related to gallbladder disease gastroenterology thought that the patient may have passed a gallstone and MRCP showed no common bile duct stones however did show cholelithiasis.  Patient has a history of recent pregnancy.  She has improved since she was admitted to the hospital.    Past Medical History:   Diagnosis Date    Abnormal liver enzymes     Exercise tolerance finding     states able to climb flight of stairs without sob/cp    Wears contact lenses     do not wear on day of surgery       Past Surgical History:   Procedure Laterality Date     SECTION      ,    WISDOM TOOTH EXTRACTION         History reviewed. No pertinent family history.    Social History     Socioeconomic History    Marital status:      Spouse name: None    Number of children: None    Years of education: None    Highest education level: None   Tobacco Use    Smoking status: Never    Smokeless tobacco: Never   Vaping Use    Vaping status: Never Used   Substance and Sexual Activity    Alcohol use: Not Currently    Drug use: Never     Social Determinants of Health     Food Insecurity: No Food Insecurity (2025)    Hunger Vital Sign     Worried About Running Out of Food in the Last Year: Never true     Ran Out of Food in the Last Year: Never true   Transportation Needs: No Transportation Needs (2025)    PRAPARE - Transportation     Lack of Transportation (Medical): No     Lack of Transportation (Non-Medical): No   Housing Stability: Low Risk  (2025)    Housing Stability Vital Sign     Unable to Pay for Housing in the Last Year: No     Number of

## 2025-01-22 NOTE — ANESTHESIA POSTPROCEDURE EVALUATION
Department of Anesthesiology  Postprocedure Note    Patient: Camryn Dukes  MRN: [id number]  YOB: 1995  Date of evaluation: 1/22/2025    Procedure Summary       Date: 01/22/25 Room / Location: [facility] MAIN 04 / [facility] MAIN OR    Anesthesia Start: 1316 Anesthesia Stop: 1400    Procedure: LAPAROSCOPIC CHOLECYSTECTOMY (Abdomen) Diagnosis:       Calculus of bile duct with chronic cholecystitis with obstruction      Liver abscess      (Calculus of bile duct with chronic cholecystitis with obstruction [K80.45])      (Liver abscess [K75.0])    Surgeons: Abhinav Ludwig MD Responsible Provider: Matti Love MD    Anesthesia Type: general ASA Status: 1            Anesthesia Type: No value filed.    Evaristo Phase I: Evaristo Score: 10    Evaristo Phase II: Evaristo Score: 9    Anesthesia Post Evaluation    Patient location during evaluation: PACU  Patient participation: complete - patient participated  Level of consciousness: awake and alert  Airway patency: patent  Nausea & Vomiting: no nausea and no vomiting  Cardiovascular status: blood pressure returned to baseline  Respiratory status: acceptable  Hydration status: euvolemic  Pain management: adequate    No notable events documented.

## 2025-01-22 NOTE — DISCHARGE INSTRUCTIONS
the area.  Red streaks leading from the area.  Pus draining from the wound.  A new or higher fever.       Bleeding After Surgery: Care Instructions  Overview  After surgery, it is common to have some minor bruising or bleeding from the cut (incision) made by your doctor. But problems may occur that cause you to bleed too much in the surgery area.  An injury to a blood vessel can cause bleeding after surgery. Other causes include medicines such as aspirin or anticoagulants (blood thinners).  To help stop the bleeding, your doctor may have put pressure on the incision or sewn up or cauterized (sealed) the incision. Or you may have had surgery to stop bleeding inside the surgery area. Your doctor also may have given you medicines that help stop the bleeding.  How can you care for yourself at home?  If you have strips of tape on the incision, leave the tape on until it falls off. Or follow your doctor's instructions for removing the tape. Keep the area dry at all times.  You will have a dressing over the incision. A dressing helps the incision heal and protects it. Your doctor will tell you how to take care of this.  If you do not have tape on the incision, wash the area daily with warm, soapy water, and pat it dry. Don't use hydrogen peroxide or alcohol, which can slow healing.    When should you call for help?    Call your doctor now or seek immediate medical care if:    You are dizzy or lightheaded, or you feel like you may faint.     You have bleeding that starts again or gets worse, such as soaking one or more bandages over 2 to 4 hours, even after holding pressure on the area.         __________________________________________________________________________________________________________________________________

## 2025-01-22 NOTE — PROGRESS NOTES
TRANSFER - OUT REPORT:    Verbal report given to Nia Booker RN on Camryn Dukes  being transferred to Phase II for routine post-op       Report consisted of patient's Situation, Background, Assessment and   Recommendations(SBAR).     Information from the following report(s) Nurse Handoff Report, Adult Overview, Surgery Report, Intake/Output, MAR, and Cardiac Rhythm SR  was reviewed with the receiving nurse.           Lines:   Peripheral IV 01/22/25 Right;Ventral Forearm (Active)   Site Assessment Clean, dry & intact 01/22/25 1439   Line Status Infusing 01/22/25 1439   Line Care Cap changed 01/22/25 1055   Phlebitis Assessment No symptoms 01/22/25 1439   Infiltration Assessment 0 01/22/25 1439   Alcohol Cap Used Yes 01/22/25 1055   Dressing Status Clean, dry & intact 01/22/25 1439   Dressing Type Transparent 01/22/25 1439   Dressing Intervention New 01/22/25 1055        Opportunity for questions and clarification was provided.      Patient transported with:  Registered Nurse

## 2025-01-22 NOTE — BRIEF OP NOTE
Brief Postoperative Note      Patient: Camryn Dukes  YOB: 1995  MRN: [id number]    Date of Procedure: 1/22/2025    Pre-Op Diagnosis Codes:      * Calculus of bile duct with chronic cholecystitis with obstruction [K80.45]     * Liver abscess [K75.0]    Post-Op Diagnosis:  Cholelithiasis cholecystitis       Procedure(s):  LAPAROSCOPIC CHOLECYSTECTOMY    Surgeon(s):  Abhinav Ludwig MD    Assistant:  Surgical Assistant: Marcela Stewart    Anesthesia: General    Estimated Blood Loss (mL): Minimal    Complications: None    Specimens:   ID Type Source Tests Collected by Time Destination   A : GALLBLADDER AND CONTENTS Tissue Gallbladder SURGICAL PATHOLOGY Abhinav Ludwig MD 1/22/2025 1337        Implants:  * No implants in log *      Drains: * No LDAs found *    Findings:  Infection Present At Time Of Surgery (PATOS) (choose all levels that have infection present):  No infection present  Other Findings: none    Electronically signed by Abhinav Ludwig MD on 1/22/2025 at 1:49 PM

## 2025-01-22 NOTE — PERIOP NOTE
Reviewed PTA medication list with patient/caregiver and patient/caregiver denies any additional medications.     Patient admits to having a responsible adult care for them at home for at least 24 hours after surgery.    Patient encouraged to use gown warming system and informed that using said warming gown to regulate body temperature prior to a procedure has been shown to help reduce the risks of blood clots and infection.    Patient's pharmacy of choice verified and documented in PTA medication section.    Dual skin assessment & fall risk band verification completed with HARRY Zepeda RN.

## (undated) DEVICE — SUTURE PDS II SZ 0 L27IN ABSRB VLT L26MM CT-2 1/2 CIR Z334H

## (undated) DEVICE — APPLIER CLP M L L11.4IN DIA10MM ENDOSCP ROT MULT FOR LIG

## (undated) DEVICE — LIQUIBAND RAPID ADHESIVE 36/CS 0.8ML: Brand: MEDLINE

## (undated) DEVICE — LAP CHOLE: Brand: MEDLINE INDUSTRIES, INC.

## (undated) DEVICE — GLOVE SURG SZ 8 L12IN FNGR THK79MIL GRN LTX FREE

## (undated) DEVICE — GARMENT,MEDLINE,DVT,INT,CALF,MED, GEN2: Brand: MEDLINE

## (undated) DEVICE — SOLUTION ANTIFOG VIS SYS CLEARIFY LAPSCP

## (undated) DEVICE — TRI-LUMEN FILTERED TUBE SET WITH ACTIVATED CHARCOAL FILTER: Brand: AIRSEAL

## (undated) DEVICE — INSUFFLATION NEEDLE TO ESTABLISH PNEUMOPERITONEUM.: Brand: INSUFFLATION NEEDLE

## (undated) DEVICE — SUTURE MONOCRYL + SZ 4-0 L27IN ABSRB UD L19MM PS-2 3/8 CIR MCP426H

## (undated) DEVICE — ELECTRODE ELECSURG LAP 5 MMX33 CM J HK PTFE EZ CLN

## (undated) DEVICE — Device: Brand: MEDEX

## (undated) DEVICE — LAPAROSCOPIC TROCAR SLEEVE/SINGLE USE: Brand: KII® OPTICAL ACCESS SYSTEM

## (undated) DEVICE — GLOVE ORANGE PI 7 1/2   MSG9075

## (undated) DEVICE — TROCAR: Brand: KII® SLEEVE

## (undated) DEVICE — SCISSORS ENDOSCP DIA5MM CRV MPLR CAUT W/ RATCH HNDL

## (undated) DEVICE — AIRSEAL 5 MM ACCESS PORT AND LOW PROFILE OBTURATOR WITH BLADELESS OPTICAL TIP, 120 MM LENGTH: Brand: AIRSEAL

## (undated) DEVICE — SET TBNG DISP TIP FOR AHTO

## (undated) DEVICE — SOLUTION IV LACTATED RINGERS INJECTION USP 1000ML

## (undated) DEVICE — TROCAR: Brand: KII® OPTICAL ACCESS SYSTEM